# Patient Record
Sex: FEMALE | Race: WHITE | NOT HISPANIC OR LATINO | Employment: OTHER | URBAN - NONMETROPOLITAN AREA
[De-identification: names, ages, dates, MRNs, and addresses within clinical notes are randomized per-mention and may not be internally consistent; named-entity substitution may affect disease eponyms.]

---

## 2017-11-11 ENCOUNTER — HOSPITAL ENCOUNTER (EMERGENCY)
Facility: HOSPITAL | Age: 62
Discharge: HOME | End: 2017-11-11
Attending: EMERGENCY MEDICINE | Admitting: EMERGENCY MEDICINE

## 2017-11-11 ENCOUNTER — APPOINTMENT (OUTPATIENT)
Dept: GENERAL RADIOLOGY | Facility: HOSPITAL | Age: 62
End: 2017-11-11

## 2017-11-11 ENCOUNTER — APPOINTMENT (OUTPATIENT)
Dept: CT IMAGING | Facility: HOSPITAL | Age: 62
End: 2017-11-11

## 2017-11-11 VITALS
WEIGHT: 170 LBS | TEMPERATURE: 98.2 F | OXYGEN SATURATION: 98 % | BODY MASS INDEX: 27.32 KG/M2 | DIASTOLIC BLOOD PRESSURE: 101 MMHG | RESPIRATION RATE: 20 BRPM | SYSTOLIC BLOOD PRESSURE: 180 MMHG | HEIGHT: 66 IN | HEART RATE: 64 BPM

## 2017-11-11 DIAGNOSIS — I10 HYPERTENSION, ESSENTIAL: ICD-10-CM

## 2017-11-11 DIAGNOSIS — R55 SYNCOPE, UNSPECIFIED SYNCOPE TYPE: Primary | ICD-10-CM

## 2017-11-11 LAB
A-A DO2: 41.4 MMHG (ref 0–300)
ALBUMIN SERPL-MCNC: 4.5 G/DL (ref 3.4–4.8)
ALBUMIN/GLOB SERPL: 1.4 G/DL (ref 1.5–2.5)
ALP SERPL-CCNC: 93 U/L (ref 35–104)
ALT SERPL W P-5'-P-CCNC: 22 U/L (ref 10–36)
ANION GAP SERPL CALCULATED.3IONS-SCNC: 2.6 MMOL/L (ref 3.6–11.2)
ARTERIAL PATENCY WRIST A: ABNORMAL
AST SERPL-CCNC: 21 U/L (ref 10–30)
ATMOSPHERIC PRESS: 735 MMHG
BACTERIA UR QL AUTO: ABNORMAL /HPF
BASE EXCESS BLDA CALC-SCNC: -0.9 MMOL/L
BASOPHILS # BLD AUTO: 0.02 10*3/MM3 (ref 0–0.3)
BASOPHILS NFR BLD AUTO: 0.3 % (ref 0–2)
BDY SITE: ABNORMAL
BILIRUB SERPL-MCNC: 0.3 MG/DL (ref 0.2–1.8)
BILIRUB UR QL STRIP: NEGATIVE
BODY TEMPERATURE: 98.6 C
BUN BLD-MCNC: 7 MG/DL (ref 7–21)
BUN/CREAT SERPL: 10.1 (ref 7–25)
CALCIUM SPEC-SCNC: 9.7 MG/DL (ref 7.7–10)
CHLORIDE SERPL-SCNC: 111 MMOL/L (ref 99–112)
CLARITY UR: CLEAR
CO2 SERPL-SCNC: 28.4 MMOL/L (ref 24.3–31.9)
COHGB MFR BLD: 5.3 % (ref 0–5)
COLOR UR: YELLOW
CREAT BLD-MCNC: 0.69 MG/DL (ref 0.43–1.29)
DEPRECATED RDW RBC AUTO: 44.4 FL (ref 37–54)
EOSINOPHIL # BLD AUTO: 0.05 10*3/MM3 (ref 0–0.7)
EOSINOPHIL NFR BLD AUTO: 0.8 % (ref 0–5)
ERYTHROCYTE [DISTWIDTH] IN BLOOD BY AUTOMATED COUNT: 13.6 % (ref 11.5–14.5)
GFR SERPL CREATININE-BSD FRML MDRD: 86 ML/MIN/1.73
GLOBULIN UR ELPH-MCNC: 3.2 GM/DL
GLUCOSE BLD-MCNC: 103 MG/DL (ref 70–110)
GLUCOSE UR STRIP-MCNC: NEGATIVE MG/DL
HCO3 BLDA-SCNC: 22.5 MMOL/L (ref 22–26)
HCT VFR BLD AUTO: 46.4 % (ref 37–47)
HCT VFR BLD CALC: 50 % (ref 37–47)
HGB BLD-MCNC: 15.7 G/DL (ref 12–16)
HGB BLDA-MCNC: 16.9 G/DL (ref 12–16)
HGB UR QL STRIP.AUTO: ABNORMAL
HOROWITZ INDEX BLD+IHG-RTO: 21 %
HYALINE CASTS UR QL AUTO: ABNORMAL /LPF
IMM GRANULOCYTES # BLD: 0.01 10*3/MM3 (ref 0–0.03)
IMM GRANULOCYTES NFR BLD: 0.2 % (ref 0–0.5)
KETONES UR QL STRIP: NEGATIVE
LEUKOCYTE ESTERASE UR QL STRIP.AUTO: NEGATIVE
LYMPHOCYTES # BLD AUTO: 2.35 10*3/MM3 (ref 1–3)
LYMPHOCYTES NFR BLD AUTO: 35.3 % (ref 21–51)
MCH RBC QN AUTO: 30.8 PG (ref 27–33)
MCHC RBC AUTO-ENTMCNC: 33.8 G/DL (ref 33–37)
MCV RBC AUTO: 91.2 FL (ref 80–94)
METHGB BLD QL: 0.2 % (ref 0–3)
MODALITY: ABNORMAL
MONOCYTES # BLD AUTO: 0.69 10*3/MM3 (ref 0.1–0.9)
MONOCYTES NFR BLD AUTO: 10.4 % (ref 0–10)
NEUTROPHILS # BLD AUTO: 3.54 10*3/MM3 (ref 1.4–6.5)
NEUTROPHILS NFR BLD AUTO: 53 % (ref 30–70)
NITRITE UR QL STRIP: NEGATIVE
OSMOLALITY SERPL CALC.SUM OF ELEC: 281.3 MOSM/KG (ref 273–305)
OXYHGB MFR BLDV: 87.7 % (ref 85–100)
PCO2 BLDA: 34.3 MM HG (ref 35–45)
PH BLDA: 7.43 PH UNITS (ref 7.35–7.45)
PH UR STRIP.AUTO: 7 [PH] (ref 5–8)
PLATELET # BLD AUTO: 245 10*3/MM3 (ref 130–400)
PMV BLD AUTO: 9.9 FL (ref 6–10)
PO2 BLDA: 62 MM HG (ref 80–100)
POTASSIUM BLD-SCNC: 3.6 MMOL/L (ref 3.5–5.3)
PROT SERPL-MCNC: 7.7 G/DL (ref 6–8)
PROT UR QL STRIP: NEGATIVE
RBC # BLD AUTO: 5.09 10*6/MM3 (ref 4.2–5.4)
RBC # UR: ABNORMAL /HPF
REF LAB TEST METHOD: ABNORMAL
SAO2 % BLDCOA: 92.8 % (ref 90–100)
SODIUM BLD-SCNC: 142 MMOL/L (ref 135–153)
SP GR UR STRIP: 1.01 (ref 1–1.03)
SQUAMOUS #/AREA URNS HPF: ABNORMAL /HPF
TROPONIN I SERPL-MCNC: <0.006 NG/ML
UROBILINOGEN UR QL STRIP: ABNORMAL
WBC NRBC COR # BLD: 6.66 10*3/MM3 (ref 4.5–12.5)
WBC UR QL AUTO: ABNORMAL /HPF

## 2017-11-11 PROCEDURE — 93010 ELECTROCARDIOGRAM REPORT: CPT | Performed by: INTERNAL MEDICINE

## 2017-11-11 PROCEDURE — 83050 HGB METHEMOGLOBIN QUAN: CPT | Performed by: EMERGENCY MEDICINE

## 2017-11-11 PROCEDURE — 84484 ASSAY OF TROPONIN QUANT: CPT | Performed by: EMERGENCY MEDICINE

## 2017-11-11 PROCEDURE — 36600 WITHDRAWAL OF ARTERIAL BLOOD: CPT | Performed by: EMERGENCY MEDICINE

## 2017-11-11 PROCEDURE — 70450 CT HEAD/BRAIN W/O DYE: CPT

## 2017-11-11 PROCEDURE — 85025 COMPLETE CBC W/AUTO DIFF WBC: CPT | Performed by: EMERGENCY MEDICINE

## 2017-11-11 PROCEDURE — 99284 EMERGENCY DEPT VISIT MOD MDM: CPT

## 2017-11-11 PROCEDURE — 70450 CT HEAD/BRAIN W/O DYE: CPT | Performed by: RADIOLOGY

## 2017-11-11 PROCEDURE — 81001 URINALYSIS AUTO W/SCOPE: CPT | Performed by: EMERGENCY MEDICINE

## 2017-11-11 PROCEDURE — 82805 BLOOD GASES W/O2 SATURATION: CPT | Performed by: EMERGENCY MEDICINE

## 2017-11-11 PROCEDURE — 93005 ELECTROCARDIOGRAM TRACING: CPT | Performed by: EMERGENCY MEDICINE

## 2017-11-11 PROCEDURE — 82375 ASSAY CARBOXYHB QUANT: CPT | Performed by: EMERGENCY MEDICINE

## 2017-11-11 PROCEDURE — 71010 HC CHEST PA OR AP: CPT

## 2017-11-11 PROCEDURE — 80053 COMPREHEN METABOLIC PANEL: CPT | Performed by: EMERGENCY MEDICINE

## 2017-11-11 PROCEDURE — 71010 XR CHEST 1 VW: CPT | Performed by: RADIOLOGY

## 2017-11-11 RX ORDER — LISINOPRIL 10 MG/1
10 TABLET ORAL DAILY
Qty: 30 TABLET | Refills: 0 | Status: SHIPPED | OUTPATIENT
Start: 2017-11-11 | End: 2017-11-28 | Stop reason: SDUPTHER

## 2017-11-11 RX ORDER — SODIUM CHLORIDE 0.9 % (FLUSH) 0.9 %
10 SYRINGE (ML) INJECTION AS NEEDED
Status: DISCONTINUED | OUTPATIENT
Start: 2017-11-11 | End: 2017-11-11 | Stop reason: HOSPADM
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
## 2017-11-28 ENCOUNTER — OFFICE VISIT (OUTPATIENT)
Dept: FAMILY MEDICINE CLINIC | Facility: CLINIC | Age: 62
End: 2017-11-28

## 2017-11-28 VITALS
SYSTOLIC BLOOD PRESSURE: 148 MMHG | TEMPERATURE: 97.3 F | BODY MASS INDEX: 29.47 KG/M2 | WEIGHT: 183.4 LBS | HEART RATE: 80 BPM | HEIGHT: 66 IN | DIASTOLIC BLOOD PRESSURE: 97 MMHG

## 2017-11-28 DIAGNOSIS — I34.1 MVP (MITRAL VALVE PROLAPSE): ICD-10-CM

## 2017-11-28 DIAGNOSIS — I10 ESSENTIAL HYPERTENSION: Primary | ICD-10-CM

## 2017-11-28 DIAGNOSIS — R55 SYNCOPE, UNSPECIFIED SYNCOPE TYPE: ICD-10-CM

## 2017-11-28 DIAGNOSIS — Z23 NEED FOR 23-POLYVALENT PNEUMOCOCCAL POLYSACCHARIDE VACCINE: ICD-10-CM

## 2017-11-28 DIAGNOSIS — Z23 NEED FOR INFLUENZA VACCINATION: ICD-10-CM

## 2017-11-28 PROBLEM — K82.9 DISEASE OF GALLBLADDER: Status: ACTIVE | Noted: 2017-11-28

## 2017-11-28 PROBLEM — K59.00 CN (CONSTIPATION): Status: ACTIVE | Noted: 2017-11-28

## 2017-11-28 PROBLEM — R10.9 ABDOMINAL PAIN: Status: ACTIVE | Noted: 2017-11-28

## 2017-11-28 LAB
CHOLEST SERPL-MCNC: 202 MG/DL (ref 0–200)
HDLC SERPL-MCNC: 48 MG/DL (ref 60–100)
LDLC SERPL CALC-MCNC: 123 MG/DL (ref 0–100)
LDLC/HDLC SERPL: 2.57 {RATIO}
TRIGL SERPL-MCNC: 153 MG/DL (ref 0–150)
TSH SERPL DL<=0.05 MIU/L-ACNC: 7.9 MIU/ML (ref 0.55–4.78)
VLDLC SERPL-MCNC: 30.6 MG/DL

## 2017-11-28 PROCEDURE — 90686 IIV4 VACC NO PRSV 0.5 ML IM: CPT | Performed by: NURSE PRACTITIONER

## 2017-11-28 PROCEDURE — 90732 PPSV23 VACC 2 YRS+ SUBQ/IM: CPT | Performed by: NURSE PRACTITIONER

## 2017-11-28 PROCEDURE — 80061 LIPID PANEL: CPT | Performed by: NURSE PRACTITIONER

## 2017-11-28 PROCEDURE — 99214 OFFICE O/P EST MOD 30 MIN: CPT | Performed by: NURSE PRACTITIONER

## 2017-11-28 PROCEDURE — 90471 IMMUNIZATION ADMIN: CPT | Performed by: NURSE PRACTITIONER

## 2017-11-28 PROCEDURE — 90472 IMMUNIZATION ADMIN EACH ADD: CPT | Performed by: NURSE PRACTITIONER

## 2017-11-28 PROCEDURE — 84443 ASSAY THYROID STIM HORMONE: CPT | Performed by: NURSE PRACTITIONER

## 2017-11-28 RX ORDER — LISINOPRIL 10 MG/1
10 TABLET ORAL DAILY
Qty: 30 TABLET | Refills: 3 | Status: SHIPPED | OUTPATIENT
Start: 2017-11-28 | End: 2018-03-08 | Stop reason: SDUPTHER
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
## 2017-11-29 DIAGNOSIS — R79.89 ELEVATED TSH: Primary | ICD-10-CM

## 2017-12-04 ENCOUNTER — LAB (OUTPATIENT)
Dept: FAMILY MEDICINE CLINIC | Facility: CLINIC | Age: 62
End: 2017-12-04

## 2017-12-04 DIAGNOSIS — R79.89 ELEVATED TSH: ICD-10-CM

## 2017-12-04 PROCEDURE — 86376 MICROSOMAL ANTIBODY EACH: CPT | Performed by: NURSE PRACTITIONER

## 2017-12-04 PROCEDURE — 36415 COLL VENOUS BLD VENIPUNCTURE: CPT | Performed by: NURSE PRACTITIONER

## 2017-12-05 PROBLEM — E03.8 OTHER SPECIFIED HYPOTHYROIDISM: Status: ACTIVE | Noted: 2017-12-05

## 2017-12-05 LAB — THYROPEROXIDASE AB SERPL-ACNC: 99 IU/ML (ref 0–34)

## 2017-12-05 RX ORDER — LEVOTHYROXINE SODIUM 0.03 MG/1
25 TABLET ORAL DAILY
Qty: 30 TABLET | Refills: 2 | Status: SHIPPED | OUTPATIENT
Start: 2017-12-05 | End: 2018-01-29 | Stop reason: SDUPTHER

## 2018-01-29 ENCOUNTER — OFFICE VISIT (OUTPATIENT)
Dept: FAMILY MEDICINE CLINIC | Facility: CLINIC | Age: 63
End: 2018-01-29

## 2018-01-29 VITALS
HEART RATE: 82 BPM | DIASTOLIC BLOOD PRESSURE: 86 MMHG | TEMPERATURE: 97.2 F | SYSTOLIC BLOOD PRESSURE: 158 MMHG | BODY MASS INDEX: 29.86 KG/M2 | HEIGHT: 66 IN | OXYGEN SATURATION: 98 % | WEIGHT: 185.8 LBS

## 2018-01-29 DIAGNOSIS — E78.49 OTHER HYPERLIPIDEMIA: ICD-10-CM

## 2018-01-29 DIAGNOSIS — E03.8 OTHER SPECIFIED HYPOTHYROIDISM: Primary | ICD-10-CM

## 2018-01-29 DIAGNOSIS — I10 ESSENTIAL HYPERTENSION: ICD-10-CM

## 2018-01-29 LAB
T4 FREE SERPL-MCNC: 1.1 NG/DL (ref 0.89–1.76)
TSH SERPL DL<=0.05 MIU/L-ACNC: 5.31 MIU/ML (ref 0.55–4.78)

## 2018-01-29 PROCEDURE — 36415 COLL VENOUS BLD VENIPUNCTURE: CPT | Performed by: NURSE PRACTITIONER

## 2018-01-29 PROCEDURE — 99214 OFFICE O/P EST MOD 30 MIN: CPT | Performed by: NURSE PRACTITIONER

## 2018-01-29 PROCEDURE — 84443 ASSAY THYROID STIM HORMONE: CPT | Performed by: NURSE PRACTITIONER

## 2018-01-29 PROCEDURE — 84439 ASSAY OF FREE THYROXINE: CPT | Performed by: NURSE PRACTITIONER

## 2018-01-29 RX ORDER — SIMVASTATIN 20 MG
20 TABLET ORAL NIGHTLY
Qty: 30 TABLET | Refills: 5 | Status: SHIPPED | OUTPATIENT
Start: 2018-01-29 | End: 2018-08-16 | Stop reason: SDUPTHER

## 2018-01-29 RX ORDER — LEVOTHYROXINE SODIUM 0.05 MG/1
50 TABLET ORAL DAILY
Qty: 30 TABLET | Refills: 2 | Status: SHIPPED | OUTPATIENT
Start: 2018-01-29 | End: 2018-04-30 | Stop reason: SDUPTHER

## 2018-01-29 NOTE — PROGRESS NOTES
"Subjective   Jocelyn Gee is a 62 y.o. female.   Chief Complaint   Patient presents with   • Essential hypertension     History of Present Illness     Patient presents today for routine follow-up regarding hypertension.  Her blood pressure is elevated in the office.  However, she does report extreme anxiety when she comes into the office.  She has a blood pressure log with her today.  She has been monitoring daily at home.  Most of her blood pressure readings have been 120 systolic and 80 diastolic.  She is tolerating medication well.  She also has history of hypothyroidism.  She has been tolerating the Synthroid well.  She will need repeat TSH today.  Her lipids were also elevated.  She is a current smoker.  Her ASCVD 10 year risk estimate is 11%.  She was referred to cardiology for further evaluation of the syncopal episode a few months ago.  However, reports at this time it is cost prohibitive and she does not wish to seek further evaluation.  She has had no more presyncopal or syncopal episodes.    The following portions of the patient's history were reviewed and updated as appropriate: allergies, current medications, past family history, past medical history, past social history, past surgical history and problem list.  /86 (BP Location: Left arm, Patient Position: Sitting, Cuff Size: Adult)  Pulse 82  Temp 97.2 °F (36.2 °C) (Oral)   Ht 167.6 cm (65.98\")  Wt 84.3 kg (185 lb 12.8 oz)  SpO2 98%  BMI 30 kg/m2  Review of Systems   Constitutional: Positive for fatigue. Negative for chills and fever.   HENT: Negative for congestion, ear pain, rhinorrhea and sore throat.    Respiratory: Negative for cough, shortness of breath and wheezing.    Cardiovascular: Negative for chest pain, palpitations and leg swelling.   Gastrointestinal: Positive for constipation. Negative for abdominal pain, diarrhea, nausea and vomiting.   Genitourinary: Negative for dysuria.   Musculoskeletal: Negative for back pain and " myalgias.   Skin: Negative for rash and wound.   Neurological: Negative for dizziness, light-headedness and headaches.   Psychiatric/Behavioral: Negative for sleep disturbance. The patient is not nervous/anxious.        Objective   Physical Exam   Constitutional: She is oriented to person, place, and time. She appears well-developed and well-nourished.   HENT:   Head: Normocephalic and atraumatic.   Cardiovascular: Normal rate, regular rhythm and normal heart sounds.    Pulmonary/Chest: Effort normal and breath sounds normal.   Abdominal: Soft. Bowel sounds are normal.   Musculoskeletal:   Gait and station normal   Neurological: She is alert and oriented to person, place, and time.   Skin: Skin is warm and dry.   Psychiatric: She has a normal mood and affect. Her behavior is normal.       Assessment/Plan   Jocelyn was seen today for essential hypertension.    Diagnoses and all orders for this visit:    Other specified hypothyroidism  -     TSH  -     T4, Free    Other hyperlipidemia  -     simvastatin (ZOCOR) 20 MG tablet; Take 1 tablet by mouth Every Night.    Essential hypertension      Her home blood pressure readings are controlled.  No changes made to the medications today since she does become very anxious when in the office.  She will continue to monitor at home.  BP parameters discussed.  I have ordered labs to be done today to reevaluate thyroid function.  We have discussed the hyperlipidemia and cardiac risk factors.  Her ASCVD 10 year risk estimate is 11%.  She is simvastatin benefit great.  I have prescribed simvastatin as noted.  We have discussed dietary changes today.  She has declined cardiology referral.  I have also offered to order an echocardiogram to further evaluate the syncopal episode and history of mitral valve abnormality but she has declined at this time.  She will follow-up in 3 months and as needed.

## 2018-03-08 ENCOUNTER — TELEPHONE (OUTPATIENT)
Dept: FAMILY MEDICINE CLINIC | Facility: CLINIC | Age: 63
End: 2018-03-08

## 2018-03-08 ENCOUNTER — OFFICE VISIT (OUTPATIENT)
Dept: FAMILY MEDICINE CLINIC | Facility: CLINIC | Age: 63
End: 2018-03-08

## 2018-03-08 VITALS
BODY MASS INDEX: 28.98 KG/M2 | OXYGEN SATURATION: 97 % | DIASTOLIC BLOOD PRESSURE: 85 MMHG | HEART RATE: 84 BPM | SYSTOLIC BLOOD PRESSURE: 149 MMHG | HEIGHT: 66 IN | WEIGHT: 180.3 LBS | TEMPERATURE: 97.8 F

## 2018-03-08 DIAGNOSIS — R55 SYNCOPE, UNSPECIFIED SYNCOPE TYPE: ICD-10-CM

## 2018-03-08 DIAGNOSIS — I34.1 MITRAL VALVE PROLAPSE: Primary | ICD-10-CM

## 2018-03-08 DIAGNOSIS — R00.2 PALPITATIONS: ICD-10-CM

## 2018-03-08 DIAGNOSIS — I10 ESSENTIAL HYPERTENSION: ICD-10-CM

## 2018-03-08 PROCEDURE — 93005 ELECTROCARDIOGRAM TRACING: CPT | Performed by: NURSE PRACTITIONER

## 2018-03-08 PROCEDURE — 99214 OFFICE O/P EST MOD 30 MIN: CPT | Performed by: NURSE PRACTITIONER

## 2018-03-08 RX ORDER — LISINOPRIL 20 MG/1
20 TABLET ORAL DAILY
Qty: 30 TABLET | Refills: 5 | Status: SHIPPED | OUTPATIENT
Start: 2018-03-08 | End: 2018-08-01

## 2018-03-08 NOTE — TELEPHONE ENCOUNTER
PATIENT CALLED AND STATES SHE IS HAVING TROUBLE WITH HER BP. SHE SAID IT WOKE HER UP AND IT FELT LIKE PALPITATIONS, FELT LIKE HER HEART STOPPED THEN STARTED BACK. HER READINGS THIS MORNING /90, 8:00AM 131/80, 8:30AM 134/84. 10:30 141/90. SHE IS WANTING TO KNOW WHAT TO DO. SHE IS SCHEDULED TODAY AT 4:00, UNLESS NOT NEEDED

## 2018-03-09 NOTE — PROGRESS NOTES
"Subjective   Jocelyn Gee is a 62 y.o. female.   Chief Complaint   Patient presents with   • Hypertension     History of Present Illness     The patient presents today with c/o elevated BP. She has been monitoring her BP for the past 24 hours and readings have been 130's-140's systolic and 80's-90's diastolic. She also was awakened in the middle of the night last night due to palpitations. States she felt her heart skipping beats. She did not feel lightheaded, dizzy, or short of breath. She did not check her BP at that time. She has felt fine today. She does report she ate cake and icing prior to bed and thinks that may have contributed. She does have a history of MVP and syncope. She has declined evaluation in the past.    The following portions of the patient's history were reviewed and updated as appropriate: allergies, current medications, past family history, past medical history, past social history, past surgical history and problem list.  /85 (BP Location: Left arm, Patient Position: Sitting, Cuff Size: Adult)  Pulse 84  Temp 97.8 °F (36.6 °C) (Oral)   Ht 167.6 cm (65.98\")  Wt 81.8 kg (180 lb 4.8 oz)  SpO2 97%  BMI 29.12 kg/m2  Review of Systems   Constitutional: Negative for chills, fatigue and fever.   HENT: Negative for congestion, ear pain, rhinorrhea and sore throat.    Respiratory: Negative for cough, shortness of breath and wheezing.    Cardiovascular: Positive for palpitations. Negative for chest pain and leg swelling.   Gastrointestinal: Negative for abdominal pain, diarrhea, nausea and vomiting.   Genitourinary: Negative for dysuria and urgency.   Musculoskeletal: Negative for back pain and myalgias.   Skin: Negative for rash and wound.   Neurological: Negative for dizziness, light-headedness and headaches.   Psychiatric/Behavioral: Negative for sleep disturbance. The patient is not nervous/anxious.        Objective   Physical Exam   Constitutional: She is oriented to person, place, and " time. She appears well-developed and well-nourished.   HENT:   Head: Normocephalic and atraumatic.   Cardiovascular: Normal rate, regular rhythm and normal heart sounds.    Pulmonary/Chest: Effort normal and breath sounds normal.   Abdominal: Soft. Bowel sounds are normal.   Musculoskeletal:   Gait and station normal   Neurological: She is alert and oriented to person, place, and time.   Skin: Skin is warm and dry.   Psychiatric: She has a normal mood and affect. Her behavior is normal.       Assessment/Plan   Jocelyn was seen today for hypertension.    Diagnoses and all orders for this visit:    Mitral valve prolapse  -     Adult Transthoracic Echo Limited W/ Cont if Necessary Per Protocol; Future  -     Holter monitor - 24 hour; Future    Essential hypertension  -     Adult Transthoracic Echo Limited W/ Cont if Necessary Per Protocol; Future  -     Holter monitor - 24 hour; Future  -     lisinopril (PRINIVIL,ZESTRIL) 20 MG tablet; Take 1 tablet by mouth Daily.    Syncope, unspecified syncope type  -     Adult Transthoracic Echo Limited W/ Cont if Necessary Per Protocol; Future  -     Holter monitor - 24 hour; Future    Palpitations  -     Adult Transthoracic Echo Limited W/ Cont if Necessary Per Protocol; Future  -     Holter monitor - 24 hour; Future          ECG today is normal sinus rhythm. However, I have recommended echocardiogram and holter monitor due to the history of MVP and recent syncopal episode. She is agreeable. I have also increased the lisinopril to 20 mg daily. She will follow up as scheduled and needed.

## 2018-03-22 ENCOUNTER — APPOINTMENT (OUTPATIENT)
Dept: CARDIOLOGY | Facility: HOSPITAL | Age: 63
End: 2018-03-22

## 2018-03-30 ENCOUNTER — HOSPITAL ENCOUNTER (OUTPATIENT)
Dept: CARDIOLOGY | Facility: HOSPITAL | Age: 63
Discharge: HOME | End: 2018-03-30
Admitting: NURSE PRACTITIONER

## 2018-03-30 DIAGNOSIS — R55 SYNCOPE, UNSPECIFIED SYNCOPE TYPE: ICD-10-CM

## 2018-03-30 DIAGNOSIS — I10 ESSENTIAL HYPERTENSION: ICD-10-CM

## 2018-03-30 DIAGNOSIS — I34.1 MITRAL VALVE PROLAPSE: ICD-10-CM

## 2018-03-30 DIAGNOSIS — R00.2 PALPITATIONS: ICD-10-CM

## 2018-03-30 PROCEDURE — 93306 TTE W/DOPPLER COMPLETE: CPT | Performed by: INTERNAL MEDICINE

## 2018-03-30 PROCEDURE — 93306 TTE W/DOPPLER COMPLETE: CPT

## 2018-04-02 LAB
BH CV ECHO MEAS - % IVS THICK: 20.8 %
BH CV ECHO MEAS - % LVPW THICK: 54.9 %
BH CV ECHO MEAS - ACS: 2.1 CM
BH CV ECHO MEAS - AO MAX PG: 9.4 MMHG
BH CV ECHO MEAS - AO MEAN PG: 4.2 MMHG
BH CV ECHO MEAS - AO ROOT AREA (BSA CORRECTED): 1.6
BH CV ECHO MEAS - AO ROOT AREA: 6.8 CM^2
BH CV ECHO MEAS - AO ROOT DIAM: 2.9 CM
BH CV ECHO MEAS - AO V2 MAX: 153.4 CM/SEC
BH CV ECHO MEAS - AO V2 MEAN: 93.4 CM/SEC
BH CV ECHO MEAS - AO V2 VTI: 27.6 CM
BH CV ECHO MEAS - BSA(HAYCOCK): 2 M^2
BH CV ECHO MEAS - BSA: 1.9 M^2
BH CV ECHO MEAS - BZI_BMI: 30 KILOGRAMS/M^2
BH CV ECHO MEAS - BZI_METRIC_HEIGHT: 165.1 CM
BH CV ECHO MEAS - BZI_METRIC_WEIGHT: 81.6 KG
BH CV ECHO MEAS - CONTRAST EF 4CH: 67.6 ML/M^2
BH CV ECHO MEAS - EDV(CUBED): 117.8 ML
BH CV ECHO MEAS - EDV(MOD-SP4): 37 ML
BH CV ECHO MEAS - EDV(TEICH): 112.9 ML
BH CV ECHO MEAS - EF(CUBED): 60.6 %
BH CV ECHO MEAS - EF(MOD-SP4): 67.6 %
BH CV ECHO MEAS - EF(TEICH): 52 %
BH CV ECHO MEAS - ESV(CUBED): 46.5 ML
BH CV ECHO MEAS - ESV(MOD-SP4): 12 ML
BH CV ECHO MEAS - ESV(TEICH): 54.3 ML
BH CV ECHO MEAS - FS: 26.7 %
BH CV ECHO MEAS - IVS/LVPW: 1.2
BH CV ECHO MEAS - IVSD: 0.88 CM
BH CV ECHO MEAS - IVSS: 1.1 CM
BH CV ECHO MEAS - LA DIMENSION: 2.9 CM
BH CV ECHO MEAS - LA/AO: 1
BH CV ECHO MEAS - LV DIASTOLIC VOL/BSA (35-75): 19.6 ML/M^2
BH CV ECHO MEAS - LV MASS(C)D: 132.8 GRAMS
BH CV ECHO MEAS - LV MASS(C)DI: 70.2 GRAMS/M^2
BH CV ECHO MEAS - LV MASS(C)S: 123.9 GRAMS
BH CV ECHO MEAS - LV MASS(C)SI: 65.5 GRAMS/M^2
BH CV ECHO MEAS - LV SYSTOLIC VOL/BSA (12-30): 6.3 ML/M^2
BH CV ECHO MEAS - LVIDD: 4.9 CM
BH CV ECHO MEAS - LVIDS: 3.6 CM
BH CV ECHO MEAS - LVLD AP4: 6.5 CM
BH CV ECHO MEAS - LVLS AP4: 5.9 CM
BH CV ECHO MEAS - LVOT AREA (M): 3.1 CM^2
BH CV ECHO MEAS - LVOT AREA: 3.2 CM^2
BH CV ECHO MEAS - LVOT DIAM: 2 CM
BH CV ECHO MEAS - LVPWD: 0.73 CM
BH CV ECHO MEAS - LVPWS: 1.1 CM
BH CV ECHO MEAS - MV A MAX VEL: 103.5 CM/SEC
BH CV ECHO MEAS - MV E MAX VEL: 87.8 CM/SEC
BH CV ECHO MEAS - MV E/A: 0.85
BH CV ECHO MEAS - PA ACC SLOPE: 1577 CM/SEC^2
BH CV ECHO MEAS - PA ACC TIME: 0.08 SEC
BH CV ECHO MEAS - PA PR(ACCEL): 44.1 MMHG
BH CV ECHO MEAS - RAP SYSTOLE: 10 MMHG
BH CV ECHO MEAS - RVSP: 31.6 MMHG
BH CV ECHO MEAS - SI(AO): 98.7 ML/M^2
BH CV ECHO MEAS - SI(CUBED): 37.7 ML/M^2
BH CV ECHO MEAS - SI(MOD-SP4): 13.2 ML/M^2
BH CV ECHO MEAS - SI(TEICH): 31 ML/M^2
BH CV ECHO MEAS - SV(AO): 186.7 ML
BH CV ECHO MEAS - SV(CUBED): 71.3 ML
BH CV ECHO MEAS - SV(MOD-SP4): 25 ML
BH CV ECHO MEAS - SV(TEICH): 58.7 ML
BH CV ECHO MEAS - TR MAX VEL: 232.1 CM/SEC
MAXIMAL PREDICTED HEART RATE: 157 BPM
STRESS TARGET HR: 133 BPM

## 2018-04-30 ENCOUNTER — OFFICE VISIT (OUTPATIENT)
Dept: FAMILY MEDICINE CLINIC | Facility: CLINIC | Age: 63
End: 2018-04-30

## 2018-04-30 VITALS
BODY MASS INDEX: 27.83 KG/M2 | TEMPERATURE: 97.6 F | OXYGEN SATURATION: 97 % | SYSTOLIC BLOOD PRESSURE: 131 MMHG | HEART RATE: 74 BPM | WEIGHT: 173.2 LBS | DIASTOLIC BLOOD PRESSURE: 81 MMHG | HEIGHT: 66 IN | RESPIRATION RATE: 12 BRPM

## 2018-04-30 DIAGNOSIS — L20.82 FLEXURAL ECZEMA: ICD-10-CM

## 2018-04-30 DIAGNOSIS — I10 ESSENTIAL HYPERTENSION: ICD-10-CM

## 2018-04-30 DIAGNOSIS — E78.49 OTHER HYPERLIPIDEMIA: Primary | ICD-10-CM

## 2018-04-30 DIAGNOSIS — E03.8 OTHER SPECIFIED HYPOTHYROIDISM: ICD-10-CM

## 2018-04-30 DIAGNOSIS — Z11.59 NEED FOR HEPATITIS C SCREENING TEST: ICD-10-CM

## 2018-04-30 LAB
ALBUMIN SERPL-MCNC: 4.3 G/DL (ref 3.4–4.8)
ALBUMIN/GLOB SERPL: 1.4 G/DL (ref 1.5–2.5)
ALP SERPL-CCNC: 80 U/L (ref 35–104)
ALT SERPL W P-5'-P-CCNC: 19 U/L (ref 10–36)
ANION GAP SERPL CALCULATED.3IONS-SCNC: 4.2 MMOL/L (ref 3.6–11.2)
AST SERPL-CCNC: 22 U/L (ref 10–30)
BILIRUB SERPL-MCNC: 0.4 MG/DL (ref 0.2–1.8)
BUN BLD-MCNC: 10 MG/DL (ref 7–21)
BUN/CREAT SERPL: 15.4 (ref 7–25)
CALCIUM SPEC-SCNC: 9.6 MG/DL (ref 7.7–10)
CHLORIDE SERPL-SCNC: 109 MMOL/L (ref 99–112)
CHOLEST SERPL-MCNC: 141 MG/DL (ref 0–200)
CO2 SERPL-SCNC: 27.8 MMOL/L (ref 24.3–31.9)
CREAT BLD-MCNC: 0.65 MG/DL (ref 0.43–1.29)
GFR SERPL CREATININE-BSD FRML MDRD: 92 ML/MIN/1.73
GLOBULIN UR ELPH-MCNC: 3 GM/DL
GLUCOSE BLD-MCNC: 82 MG/DL (ref 70–110)
HCV AB SER DONR QL: NORMAL
HDLC SERPL-MCNC: 45 MG/DL (ref 60–100)
LDLC SERPL CALC-MCNC: 77 MG/DL (ref 0–100)
LDLC/HDLC SERPL: 1.7 {RATIO}
OSMOLALITY SERPL CALC.SUM OF ELEC: 279.4 MOSM/KG (ref 273–305)
POTASSIUM BLD-SCNC: 3.9 MMOL/L (ref 3.5–5.3)
PROT SERPL-MCNC: 7.3 G/DL (ref 6–8)
SODIUM BLD-SCNC: 141 MMOL/L (ref 135–153)
T4 FREE SERPL-MCNC: 1.31 NG/DL (ref 0.89–1.76)
TRIGL SERPL-MCNC: 97 MG/DL (ref 0–150)
TSH SERPL DL<=0.05 MIU/L-ACNC: 1.53 MIU/ML (ref 0.55–4.78)
VLDLC SERPL-MCNC: 19.4 MG/DL

## 2018-04-30 PROCEDURE — 86803 HEPATITIS C AB TEST: CPT | Performed by: NURSE PRACTITIONER

## 2018-04-30 PROCEDURE — 84443 ASSAY THYROID STIM HORMONE: CPT | Performed by: NURSE PRACTITIONER

## 2018-04-30 PROCEDURE — 80053 COMPREHEN METABOLIC PANEL: CPT | Performed by: NURSE PRACTITIONER

## 2018-04-30 PROCEDURE — 80061 LIPID PANEL: CPT | Performed by: NURSE PRACTITIONER

## 2018-04-30 PROCEDURE — 99213 OFFICE O/P EST LOW 20 MIN: CPT | Performed by: NURSE PRACTITIONER

## 2018-04-30 PROCEDURE — 84439 ASSAY OF FREE THYROXINE: CPT | Performed by: NURSE PRACTITIONER

## 2018-04-30 PROCEDURE — 36415 COLL VENOUS BLD VENIPUNCTURE: CPT | Performed by: NURSE PRACTITIONER

## 2018-04-30 RX ORDER — LEVOTHYROXINE SODIUM 0.05 MG/1
50 TABLET ORAL DAILY
Qty: 30 TABLET | Refills: 5 | Status: SHIPPED | OUTPATIENT
Start: 2018-04-30 | End: 2018-09-27 | Stop reason: SDUPTHER

## 2018-04-30 RX ORDER — CLOBETASOL PROPIONATE 0.5 MG/G
OINTMENT TOPICAL 2 TIMES DAILY PRN
Qty: 60 G | Refills: 0 | Status: SHIPPED | OUTPATIENT
Start: 2018-04-30 | End: 2019-01-16

## 2018-04-30 NOTE — PROGRESS NOTES
Please let her know her thyroid function is normal. I sent in refills on the synthroid. Also, her cholesterol is much better.

## 2018-04-30 NOTE — PROGRESS NOTES
"Win Gee is a 62 y.o. female.   Chief Complaint   Patient presents with   • Follow-up   • blood work     History of Present Illness     The patient presents today for routine follow-up regarding hypertension, hyperlipidemia.  Her blood pressure is controlled.  She has been compliant with medication.  Has also been working on diet and weight loss.  Has lost 10 pounds in the last 5 months intentionally.  She has a history of hypothyroidism on statin therapy. A recent echocardiogram showed left ventricular relaxation (Grade I). This was otherwise normal. She has not had the holter monitor that was ordered due to cost. She has had no syncopal episodes or palpitations. She continues to smoke and is not ready to quit.    The following portions of the patient's history were reviewed and updated as appropriate: allergies, current medications, past family history, past medical history, past social history, past surgical history and problem list.  /81 (BP Location: Right arm, Patient Position: Sitting, Cuff Size: Adult)   Pulse 74   Temp 97.6 °F (36.4 °C)   Resp 12   Ht 167.6 cm (66\")   Wt 78.6 kg (173 lb 3.2 oz)   SpO2 97%   BMI 27.96 kg/m²   Review of Systems   Constitutional: Negative for chills, fatigue and fever.   HENT: Negative for congestion, ear pain, rhinorrhea and sore throat.    Respiratory: Negative for cough, shortness of breath and wheezing.    Cardiovascular: Negative for chest pain, palpitations and leg swelling.   Gastrointestinal: Negative for abdominal pain, diarrhea, nausea and vomiting.   Genitourinary: Negative for dysuria.   Musculoskeletal: Negative for back pain and myalgias.   Skin: Negative for rash and wound.   Neurological: Negative for dizziness, light-headedness and headaches.   Psychiatric/Behavioral: Negative for sleep disturbance. The patient is not nervous/anxious.        Objective   Physical Exam   Constitutional: She is oriented to person, place, and time. She " appears well-developed and well-nourished.   HENT:   Head: Normocephalic and atraumatic.   Cardiovascular: Normal rate, regular rhythm and normal heart sounds.    Pulmonary/Chest: Effort normal and breath sounds normal.   Abdominal: Soft. Bowel sounds are normal.   Musculoskeletal:   Gait and station normal   Neurological: She is alert and oriented to person, place, and time.   Skin: Skin is warm and dry.   Psychiatric: She has a normal mood and affect. Her behavior is normal.       Assessment/Plan   Jocelyn was seen today for follow-up and blood work.    Diagnoses and all orders for this visit:    Other hyperlipidemia  -     TSH  -     T4, Free  -     Lipid Panel  -     Comprehensive Metabolic Panel    Essential hypertension  -     TSH  -     T4, Free  -     Lipid Panel  -     Comprehensive Metabolic Panel    Other specified hypothyroidism  -     TSH  -     T4, Free  -     Lipid Panel  -     Comprehensive Metabolic Panel    Flexural eczema  -     clobetasol (TEMOVATE) 0.05 % ointment; Apply  topically 2 (Two) Times a Day As Needed (rash).  -     TSH  -     T4, Free  -     Lipid Panel  -     Comprehensive Metabolic Panel    Need for hepatitis C screening test  -     Hepatitis C Antibody      We have discussed echocardiogram results at length.  Again, I have encouraged her to proceed with Holter monitor as ordered.  I have ordered fasting labs to be done today.  She will continue her current medications.  Continue weight loss program.  Continue to monitor BP at home.  I have also ordered hepatitis C screening.  She does report a history of eczema and has used clobetasol cream for years.  She asked for a prescription today.  I will renew this for her.  PME discussed.  She does not wish to pursue at this time.  Follow-up in 6 months and as needed.

## 2018-05-10 ENCOUNTER — HOSPITAL ENCOUNTER (OUTPATIENT)
Dept: RESPIRATORY THERAPY | Facility: HOSPITAL | Age: 63
Discharge: HOME | End: 2018-05-10
Admitting: NURSE PRACTITIONER

## 2018-05-10 DIAGNOSIS — I10 ESSENTIAL HYPERTENSION: ICD-10-CM

## 2018-05-10 DIAGNOSIS — R55 SYNCOPE, UNSPECIFIED SYNCOPE TYPE: ICD-10-CM

## 2018-05-10 DIAGNOSIS — I34.1 MITRAL VALVE PROLAPSE: ICD-10-CM

## 2018-05-10 DIAGNOSIS — R00.2 PALPITATIONS: ICD-10-CM

## 2018-05-10 PROCEDURE — 93225 XTRNL ECG REC<48 HRS REC: CPT

## 2018-05-10 PROCEDURE — 93226 XTRNL ECG REC<48 HR SCAN A/R: CPT

## 2018-05-15 PROCEDURE — 93227 XTRNL ECG REC<48 HR R&I: CPT | Performed by: INTERNAL MEDICINE

## 2018-08-01 ENCOUNTER — OFFICE VISIT (OUTPATIENT)
Dept: FAMILY MEDICINE CLINIC | Facility: CLINIC | Age: 63
End: 2018-08-01

## 2018-08-01 VITALS
TEMPERATURE: 97.6 F | HEART RATE: 72 BPM | SYSTOLIC BLOOD PRESSURE: 180 MMHG | DIASTOLIC BLOOD PRESSURE: 94 MMHG | HEIGHT: 66 IN | BODY MASS INDEX: 27.51 KG/M2 | WEIGHT: 171.2 LBS

## 2018-08-01 DIAGNOSIS — I10 ESSENTIAL HYPERTENSION: Primary | ICD-10-CM

## 2018-08-01 PROCEDURE — 99213 OFFICE O/P EST LOW 20 MIN: CPT | Performed by: FAMILY MEDICINE

## 2018-08-01 RX ORDER — LOSARTAN POTASSIUM 50 MG/1
50 TABLET ORAL DAILY
Qty: 30 TABLET | Refills: 6 | Status: SHIPPED | OUTPATIENT
Start: 2018-08-01 | End: 2019-02-14 | Stop reason: SDUPTHER

## 2018-08-05 NOTE — PROGRESS NOTES
"Subjective     Chief Complaint   Patient presents with   • Hypertension       Jocelyn Gee is a 62 y.o. female.     History of Present Illness concerned about fluctuating blood pressure.  No side effects perceived.  Denies headaches visual changes CP SOB palpitations GI  changes.  Compliant with medications.    The following portions of the patient's history were reviewed and updated as appropriate: allergies, current medications, past family history, past social history, past surgical history and problem list.    Review of Systems see the history of present illness    Objective   Physical Exam   Constitutional: She is oriented to person, place, and time. She appears well-developed and well-nourished.   HENT:   Head: Normocephalic.   Mouth/Throat: Oropharynx is clear and moist.   Eyes: Pupils are equal, round, and reactive to light. Conjunctivae and EOM are normal.   Neck: Normal range of motion. Neck supple.   Cardiovascular: Normal rate, regular rhythm and normal heart sounds.    No murmur heard.  Pulmonary/Chest: Effort normal and breath sounds normal.   Musculoskeletal: She exhibits no edema.   Neurological: She is alert and oriented to person, place, and time.   Psychiatric: She has a normal mood and affect.   Vitals reviewed.    /94 (BP Location: Left arm, Patient Position: Sitting, Cuff Size: Adult)   Pulse 72   Temp 97.6 °F (36.4 °C) (Oral)   Ht 167.6 cm (65.98\")   Wt 77.7 kg (171 lb 3.2 oz)   BMI 27.65 kg/m²   Assessment/Plan   Jocelyn was seen today for hypertension.    Diagnoses and all orders for this visit:    Essential hypertension  -     losartan (COZAAR) 50 MG tablet; Take 1 tablet by mouth Daily.      Home blood pressure readings noted.  Blood pressure noted here.  Will DC lisinopril and start losartan.  As we discussed may need additional medication.  Maintain heart healthy diet.  Stay safely active.  Recheck in about 2 weeks or as needed.         "

## 2018-08-16 ENCOUNTER — OFFICE VISIT (OUTPATIENT)
Dept: FAMILY MEDICINE CLINIC | Facility: CLINIC | Age: 63
End: 2018-08-16

## 2018-08-16 VITALS
WEIGHT: 170.6 LBS | DIASTOLIC BLOOD PRESSURE: 92 MMHG | BODY MASS INDEX: 27.42 KG/M2 | SYSTOLIC BLOOD PRESSURE: 148 MMHG | HEIGHT: 66 IN | HEART RATE: 76 BPM | TEMPERATURE: 97.9 F

## 2018-08-16 DIAGNOSIS — E78.49 OTHER HYPERLIPIDEMIA: ICD-10-CM

## 2018-08-16 DIAGNOSIS — I10 ESSENTIAL HYPERTENSION: Primary | ICD-10-CM

## 2018-08-16 PROCEDURE — 99213 OFFICE O/P EST LOW 20 MIN: CPT | Performed by: FAMILY MEDICINE

## 2018-08-16 RX ORDER — SIMVASTATIN 20 MG
20 TABLET ORAL NIGHTLY
Qty: 90 TABLET | Refills: 3 | Status: SHIPPED | OUTPATIENT
Start: 2018-08-16 | End: 2019-02-14 | Stop reason: SDUPTHER

## 2018-09-27 ENCOUNTER — TELEPHONE (OUTPATIENT)
Dept: FAMILY MEDICINE CLINIC | Facility: CLINIC | Age: 63
End: 2018-09-27

## 2018-09-27 RX ORDER — LEVOTHYROXINE SODIUM 0.05 MG/1
50 TABLET ORAL DAILY
Qty: 30 TABLET | Refills: 5 | Status: SHIPPED | OUTPATIENT
Start: 2018-09-27 | End: 2019-02-14 | Stop reason: SDUPTHER

## 2018-09-30 RX ORDER — HYDROCHLOROTHIAZIDE 25 MG/1
25 TABLET ORAL DAILY
Qty: 30 TABLET | Refills: 5 | Status: SHIPPED | OUTPATIENT
Start: 2018-09-30 | End: 2019-02-14 | Stop reason: SDUPTHER

## 2019-01-16 ENCOUNTER — OFFICE VISIT (OUTPATIENT)
Dept: RETAIL CLINIC | Facility: CLINIC | Age: 64
End: 2019-01-16

## 2019-01-16 VITALS
WEIGHT: 163.2 LBS | TEMPERATURE: 97.9 F | OXYGEN SATURATION: 98 % | SYSTOLIC BLOOD PRESSURE: 110 MMHG | HEART RATE: 80 BPM | DIASTOLIC BLOOD PRESSURE: 70 MMHG | HEIGHT: 66 IN | RESPIRATION RATE: 18 BRPM | BODY MASS INDEX: 26.23 KG/M2

## 2019-01-16 DIAGNOSIS — J06.9 ACUTE URI: Primary | ICD-10-CM

## 2019-01-16 LAB
EXPIRATION DATE: NORMAL
INTERNAL CONTROL: NORMAL
Lab: NORMAL
S PYO AG THROAT QL: NEGATIVE

## 2019-01-16 PROCEDURE — 99213 OFFICE O/P EST LOW 20 MIN: CPT | Performed by: NURSE PRACTITIONER

## 2019-01-16 PROCEDURE — 87880 STREP A ASSAY W/OPTIC: CPT | Performed by: NURSE PRACTITIONER

## 2019-01-16 RX ORDER — FLUTICASONE PROPIONATE 50 MCG
2 SPRAY, SUSPENSION (ML) NASAL DAILY
Qty: 1 BOTTLE | Refills: 0 | Status: SHIPPED | OUTPATIENT
Start: 2019-01-16 | End: 2019-02-14

## 2019-01-16 RX ORDER — GUAIFENESIN 600 MG/1
1200 TABLET, EXTENDED RELEASE ORAL 2 TIMES DAILY
Qty: 20 TABLET | Refills: 0 | Status: SHIPPED | OUTPATIENT
Start: 2019-01-16 | End: 2019-01-21

## 2019-01-16 RX ORDER — METHYLPREDNISOLONE 4 MG/1
TABLET ORAL
Qty: 1 EACH | Refills: 0 | Status: SHIPPED | OUTPATIENT
Start: 2019-01-16 | End: 2019-01-21

## 2019-01-16 NOTE — PATIENT INSTRUCTIONS
"Upper Respiratory Infection, Adult  Most upper respiratory infections (URIs) are a viral infection of the air passages leading to the lungs. A URI affects the nose, throat, and upper air passages. The most common type of URI is nasopharyngitis and is typically referred to as \"the common cold.\"  URIs run their course and usually go away on their own. Most of the time, a URI does not require medical attention, but sometimes a bacterial infection in the upper airways can follow a viral infection. This is called a secondary infection. Sinus and middle ear infections are common types of secondary upper respiratory infections.  Bacterial pneumonia can also complicate a URI. A URI can worsen asthma and chronic obstructive pulmonary disease (COPD). Sometimes, these complications can require emergency medical care and may be life threatening.  What are the causes?  Almost all URIs are caused by viruses. A virus is a type of germ and can spread from one person to another.  What increases the risk?  You may be at risk for a URI if:  · You smoke.  · You have chronic heart or lung disease.  · You have a weakened defense (immune) system.  · You are very young or very old.  · You have nasal allergies or asthma.  · You work in crowded or poorly ventilated areas.  · You work in health care facilities or schools.    What are the signs or symptoms?  Symptoms typically develop 2-3 days after you come in contact with a cold virus. Most viral URIs last 7-10 days. However, viral URIs from the influenza virus (flu virus) can last 14-18 days and are typically more severe. Symptoms may include:  · Runny or stuffy (congested) nose.  · Sneezing.  · Cough.  · Sore throat.  · Headache.  · Fatigue.  · Fever.  · Loss of appetite.  · Pain in your forehead, behind your eyes, and over your cheekbones (sinus pain).  · Muscle aches.    How is this diagnosed?  Your health care provider may diagnose a URI by:  · Physical exam.  · Tests to check that your " symptoms are not due to another condition such as:  ? Strep throat.  ? Sinusitis.  ? Pneumonia.  ? Asthma.    How is this treated?  A URI goes away on its own with time. It cannot be cured with medicines, but medicines may be prescribed or recommended to relieve symptoms. Medicines may help:  · Reduce your fever.  · Reduce your cough.  · Relieve nasal congestion.    Follow these instructions at home:  · Take medicines only as directed by your health care provider.  · Gargle warm saltwater or take cough drops to comfort your throat as directed by your health care provider.  · Use a warm mist humidifier or inhale steam from a shower to increase air moisture. This may make it easier to breathe.  · Drink enough fluid to keep your urine clear or pale yellow.  · Eat soups and other clear broths and maintain good nutrition.  · Rest as needed.  · Return to work when your temperature has returned to normal or as your health care provider advises. You may need to stay home longer to avoid infecting others. You can also use a face mask and careful hand washing to prevent spread of the virus.  · Increase the usage of your inhaler if you have asthma.  · Do not use any tobacco products, including cigarettes, chewing tobacco, or electronic cigarettes. If you need help quitting, ask your health care provider.  How is this prevented?  The best way to protect yourself from getting a cold is to practice good hygiene.  · Avoid oral or hand contact with people with cold symptoms.  · Wash your hands often if contact occurs.    There is no clear evidence that vitamin C, vitamin E, echinacea, or exercise reduces the chance of developing a cold. However, it is always recommended to get plenty of rest, exercise, and practice good nutrition.  Contact a health care provider if:  · You are getting worse rather than better.  · Your symptoms are not controlled by medicine.  · You have chills.  · You have worsening shortness of breath.  · You have  brown or red mucus.  · You have yellow or brown nasal discharge.  · You have pain in your face, especially when you bend forward.  · You have a fever.  · You have swollen neck glands.  · You have pain while swallowing.  · You have white areas in the back of your throat.  Get help right away if:  · You have severe or persistent:  ? Headache.  ? Ear pain.  ? Sinus pain.  ? Chest pain.  · You have chronic lung disease and any of the following:  ? Wheezing.  ? Prolonged cough.  ? Coughing up blood.  ? A change in your usual mucus.  · You have a stiff neck.  · You have changes in your:  ? Vision.  ? Hearing.  ? Thinking.  ? Mood.  This information is not intended to replace advice given to you by your health care provider. Make sure you discuss any questions you have with your health care provider.  Document Released: 06/13/2002 Document Revised: 08/20/2017 Document Reviewed: 03/25/2015  [employer] Patient Education © 2018 [employer]

## 2019-01-16 NOTE — PROGRESS NOTES
"Subjective   Jocelyn Gee is a 63 y.o. female.   Chief Complaint   Patient presents with   • URI      URI    This is a new problem. The current episode started yesterday. The problem has been gradually worsening. Maximum temperature: flet warm. The fever has been present for less than 1 day. Associated symptoms include congestion, coughing, headaches, rhinorrhea (yellow) and a sore throat. Pertinent negatives include no chest pain, ear pain, joint pain, nausea, rash or wheezing. She has tried nothing for the symptoms.        Jocelyn Gee  presents to [facility] with cc of congestion and cough for 2 days and says she felt like she had fever last night. Reviewed the PMFSH.  See ROS.    The following portions of the patient's history were reviewed and updated as appropriate: allergies, current medications, past family history, past medical history, past social history, past surgical history and problem list.    Review of Systems   Constitutional: Positive for fever. Negative for chills and fatigue.   HENT: Positive for congestion, rhinorrhea (yellow) and sore throat. Negative for ear pain and trouble swallowing.    Respiratory: Positive for cough and chest tightness. Negative for shortness of breath and wheezing.    Cardiovascular: Negative for chest pain.   Gastrointestinal: Negative for nausea.   Endocrine: Negative for cold intolerance.   Musculoskeletal: Negative for arthralgias and joint pain.   Skin: Negative for rash.   Neurological: Positive for headaches.       Visit Vitals  /70 (BP Location: Right arm, Patient Position: Sitting, Cuff Size: Adult)   Pulse 80   Temp 97.9 °F (36.6 °C) (Temporal)   Resp 18   Ht 167.6 cm (66\")   Wt 74 kg (163 lb 3.2 oz)   SpO2 98%   BMI 26.34 kg/m²       Objective     Current Outpatient Medications:   •  hydrochlorothiazide (HYDRODIURIL) 25 MG tablet, Take 1 tablet by mouth Daily., Disp: 30 tablet, Rfl: 5  •  levothyroxine (SYNTHROID, LEVOTHROID) 50 MCG tablet, Take 1 tablet by mouth " Daily., Disp: 30 tablet, Rfl: 5  •  losartan (COZAAR) 50 MG tablet, Take 1 tablet by mouth Daily., Disp: 30 tablet, Rfl: 6  •  simvastatin (ZOCOR) 20 MG tablet, Take 1 tablet by mouth Every Night., Disp: 90 tablet, Rfl: 3  •  fluticasone (FLONASE) 50 MCG/ACT nasal spray, 2 sprays into the nostril(s) as directed by provider Daily for 30 days., Disp: 1 bottle, Rfl: 0  •  guaiFENesin (MUCINEX) 600 MG 12 hr tablet, Take 2 tablets by mouth 2 (Two) Times a Day for 5 days., Disp: 20 tablet, Rfl: 0  •  MethylPREDNISolone (MEDROL, SEA,) 4 MG tablet, Take as directed on package instructions., Disp: 1 each, Rfl: 0  No Known Allergies    Physical Exam   Constitutional: She is oriented to person, place, and time. She appears well-developed and well-nourished. No distress.   HENT:   Head: Normocephalic and atraumatic.   Right Ear: Tympanic membrane and external ear normal.   Left Ear: Tympanic membrane and external ear normal.   Nose: Mucosal edema present. Right sinus exhibits no maxillary sinus tenderness and no frontal sinus tenderness. Left sinus exhibits no maxillary sinus tenderness and no frontal sinus tenderness.   Mouth/Throat: Uvula is midline and mucous membranes are normal. Posterior oropharyngeal erythema present. No oropharyngeal exudate. Tonsils are 0 on the right. Tonsils are 0 on the left. No tonsillar exudate.   Eyes: Conjunctivae and EOM are normal. Pupils are equal, round, and reactive to light.   Neck: Normal range of motion.   Cardiovascular: Normal rate, regular rhythm and normal heart sounds.   Pulmonary/Chest: Effort normal. No respiratory distress. She has wheezes (scatterd bilateral wheezing). She has no rales. She exhibits no tenderness.   Abdominal: Soft. Bowel sounds are normal. She exhibits no distension. There is no tenderness.   Musculoskeletal: Normal range of motion.   Lymphadenopathy:     She has no cervical adenopathy.   Neurological: She is alert and oriented to person, place, and time.    Skin: Skin is warm and dry.   Psychiatric: She has a normal mood and affect. Her behavior is normal. Judgment and thought content normal.   Nursing note and vitals reviewed.      Lab Results (last 24 hours)     Procedure Component Value Units Date/Time    POCT rapid strep A [[id number]]  (Normal) Collected:  01/16/19 1105    Specimen:  Swab Updated:  01/16/19 1105     Rapid Strep A Screen Negative     Internal Control Passed     Lot Number [id number]     Expiration Date 4/2,020          Assessment/Plan   Jocelyn was seen today for uri.    Diagnoses and all orders for this visit:    Acute URI  -     POCT rapid strep A  -     MethylPREDNISolone (MEDROL, SEA,) 4 MG tablet; Take as directed on package instructions.  -     guaiFENesin (MUCINEX) 600 MG 12 hr tablet; Take 2 tablets by mouth 2 (Two) Times a Day for 5 days.  -     fluticasone (FLONASE) 50 MCG/ACT nasal spray; 2 sprays into the nostril(s) as directed by provider Daily for 30 days.

## 2019-02-14 ENCOUNTER — OFFICE VISIT (OUTPATIENT)
Dept: FAMILY MEDICINE CLINIC | Facility: CLINIC | Age: 64
End: 2019-02-14

## 2019-02-14 VITALS
HEIGHT: 66 IN | WEIGHT: 159.8 LBS | HEART RATE: 77 BPM | TEMPERATURE: 97.4 F | SYSTOLIC BLOOD PRESSURE: 128 MMHG | DIASTOLIC BLOOD PRESSURE: 81 MMHG | BODY MASS INDEX: 25.68 KG/M2

## 2019-02-14 DIAGNOSIS — Z23 FLU VACCINE NEED: Primary | ICD-10-CM

## 2019-02-14 DIAGNOSIS — E78.49 OTHER HYPERLIPIDEMIA: ICD-10-CM

## 2019-02-14 DIAGNOSIS — I10 ESSENTIAL HYPERTENSION: ICD-10-CM

## 2019-02-14 DIAGNOSIS — E03.8 OTHER SPECIFIED HYPOTHYROIDISM: ICD-10-CM

## 2019-02-14 LAB
ALBUMIN SERPL-MCNC: 4.3 G/DL (ref 3.4–4.8)
ALBUMIN/GLOB SERPL: 1.6 G/DL (ref 1.5–2.5)
ALP SERPL-CCNC: 87 U/L (ref 35–104)
ALT SERPL W P-5'-P-CCNC: 20 U/L (ref 10–36)
ANION GAP SERPL CALCULATED.3IONS-SCNC: 2.2 MMOL/L (ref 3.6–11.2)
AST SERPL-CCNC: 18 U/L (ref 10–30)
BASOPHILS # BLD AUTO: 0.02 10*3/MM3 (ref 0–0.3)
BASOPHILS NFR BLD AUTO: 0.2 % (ref 0–2)
BILIRUB SERPL-MCNC: 0.3 MG/DL (ref 0.2–1.8)
BUN BLD-MCNC: 9 MG/DL (ref 7–21)
BUN/CREAT SERPL: 15 (ref 7–25)
CALCIUM SPEC-SCNC: 9.2 MG/DL (ref 7.7–10)
CHLORIDE SERPL-SCNC: 100 MMOL/L (ref 99–112)
CHOLEST SERPL-MCNC: 141 MG/DL (ref 0–200)
CO2 SERPL-SCNC: 33.8 MMOL/L (ref 24.3–31.9)
CREAT BLD-MCNC: 0.6 MG/DL (ref 0.43–1.29)
DEPRECATED RDW RBC AUTO: 45.3 FL (ref 37–54)
EOSINOPHIL # BLD AUTO: 0.15 10*3/MM3 (ref 0–0.7)
EOSINOPHIL NFR BLD AUTO: 1.8 % (ref 0–5)
ERYTHROCYTE [DISTWIDTH] IN BLOOD BY AUTOMATED COUNT: 13.9 % (ref 11.5–14.5)
GFR SERPL CREATININE-BSD FRML MDRD: 101 ML/MIN/1.73
GLOBULIN UR ELPH-MCNC: 2.7 GM/DL
GLUCOSE BLD-MCNC: 94 MG/DL (ref 70–110)
HCT VFR BLD AUTO: 44.1 % (ref 37–47)
HDLC SERPL-MCNC: 51 MG/DL (ref 60–100)
HGB BLD-MCNC: 15 G/DL (ref 12–16)
IMM GRANULOCYTES # BLD AUTO: 0.02 10*3/MM3 (ref 0–0.03)
IMM GRANULOCYTES NFR BLD AUTO: 0.2 % (ref 0–0.5)
LDLC SERPL CALC-MCNC: 75 MG/DL (ref 0–100)
LDLC/HDLC SERPL: 1.46 {RATIO}
LYMPHOCYTES # BLD AUTO: 2.55 10*3/MM3 (ref 1–3)
LYMPHOCYTES NFR BLD AUTO: 31.3 % (ref 21–51)
MCH RBC QN AUTO: 30.8 PG (ref 27–33)
MCHC RBC AUTO-ENTMCNC: 34 G/DL (ref 33–37)
MCV RBC AUTO: 90.6 FL (ref 80–94)
MONOCYTES # BLD AUTO: 0.84 10*3/MM3 (ref 0.1–0.9)
MONOCYTES NFR BLD AUTO: 10.3 % (ref 0–10)
NEUTROPHILS # BLD AUTO: 4.57 10*3/MM3 (ref 1.4–6.5)
NEUTROPHILS NFR BLD AUTO: 56.2 % (ref 30–70)
OSMOLALITY SERPL CALC.SUM OF ELEC: 270.4 MOSM/KG (ref 273–305)
PLATELET # BLD AUTO: 294 10*3/MM3 (ref 130–400)
PMV BLD AUTO: 10.5 FL (ref 6–10)
POTASSIUM BLD-SCNC: 3.3 MMOL/L (ref 3.5–5.3)
PROT SERPL-MCNC: 7 G/DL (ref 6–8)
RBC # BLD AUTO: 4.87 10*6/MM3 (ref 4.2–5.4)
SODIUM BLD-SCNC: 136 MMOL/L (ref 135–153)
TRIGL SERPL-MCNC: 77 MG/DL (ref 0–150)
TSH SERPL DL<=0.05 MIU/L-ACNC: 2.62 MIU/ML (ref 0.55–4.78)
VLDLC SERPL-MCNC: 15.4 MG/DL
WBC NRBC COR # BLD: 8.15 10*3/MM3 (ref 4.5–12.5)

## 2019-02-14 PROCEDURE — 90674 CCIIV4 VAC NO PRSV 0.5 ML IM: CPT | Performed by: FAMILY MEDICINE

## 2019-02-14 PROCEDURE — 80053 COMPREHEN METABOLIC PANEL: CPT | Performed by: FAMILY MEDICINE

## 2019-02-14 PROCEDURE — 85025 COMPLETE CBC W/AUTO DIFF WBC: CPT | Performed by: FAMILY MEDICINE

## 2019-02-14 PROCEDURE — 90471 IMMUNIZATION ADMIN: CPT | Performed by: FAMILY MEDICINE

## 2019-02-14 PROCEDURE — 99213 OFFICE O/P EST LOW 20 MIN: CPT | Performed by: FAMILY MEDICINE

## 2019-02-14 PROCEDURE — 84443 ASSAY THYROID STIM HORMONE: CPT | Performed by: FAMILY MEDICINE

## 2019-02-14 PROCEDURE — 36415 COLL VENOUS BLD VENIPUNCTURE: CPT | Performed by: FAMILY MEDICINE

## 2019-02-14 PROCEDURE — 80061 LIPID PANEL: CPT | Performed by: FAMILY MEDICINE

## 2019-02-14 RX ORDER — LOSARTAN POTASSIUM 50 MG/1
50 TABLET ORAL DAILY
Qty: 90 TABLET | Refills: 1 | Status: SHIPPED | OUTPATIENT
Start: 2019-02-14 | End: 2019-08-15 | Stop reason: SDUPTHER

## 2019-02-14 RX ORDER — LEVOTHYROXINE SODIUM 0.05 MG/1
50 TABLET ORAL DAILY
Qty: 90 TABLET | Refills: 1 | Status: SHIPPED | OUTPATIENT
Start: 2019-02-14 | End: 2019-08-15 | Stop reason: SDUPTHER

## 2019-02-14 RX ORDER — HYDROCHLOROTHIAZIDE 25 MG/1
25 TABLET ORAL DAILY
Qty: 90 TABLET | Refills: 1 | Status: SHIPPED | OUTPATIENT
Start: 2019-02-14 | End: 2019-08-15 | Stop reason: SDUPTHER

## 2019-02-14 RX ORDER — SIMVASTATIN 20 MG
20 TABLET ORAL NIGHTLY
Qty: 90 TABLET | Refills: 1 | Status: SHIPPED | OUTPATIENT
Start: 2019-02-14 | End: 2019-08-15 | Stop reason: SDUPTHER

## 2019-02-14 NOTE — PROGRESS NOTES
Subjective   Jocelyn Gee is a 63 y.o. female.     History of Present Illness  follow-up regarding hypertension dyslipidemia.  Generally having no new problems.  No recent illness.  Compliant with medicines as reconciled.  Continues to smoke but less.  Denies chest CDV GI  concerns.  Energy is fair.  Still works outside of home.    The following portions of the patient's history were reviewed and updated as appropriate: allergies, past family history, past medical history, past social history, past surgical history and problem list.    Review of Systems  see the history of present illness  Objective   Physical Exam   Constitutional: She is oriented to person, place, and time. She appears well-developed and well-nourished.   HENT:   Head: Normocephalic.   Mouth/Throat: Oropharynx is clear and moist.   Eyes: Conjunctivae and EOM are normal. Pupils are equal, round, and reactive to light.   Neck: Normal range of motion. Neck supple.   Cardiovascular: Normal rate, regular rhythm and normal heart sounds.   No murmur heard.  Pulmonary/Chest: Effort normal and breath sounds normal.   Musculoskeletal: She exhibits no edema.   Neurological: She is alert and oriented to person, place, and time.   Psychiatric: She has a normal mood and affect.   Vitals reviewed.      Assessment/Plan   Jocelyn was seen today for essential hypertension.    Diagnoses and all orders for this visit:    Flu vaccine need  -     Flucelvax Quad=>4Years (PFS)    Essential hypertension  -     hydrochlorothiazide (HYDRODIURIL) 25 MG tablet; Take 1 tablet by mouth Daily.  -     losartan (COZAAR) 50 MG tablet; Take 1 tablet by mouth Daily.  -     CBC & Differential  -     Comprehensive Metabolic Panel  -     CBC Auto Differential    Other hyperlipidemia  -     simvastatin (ZOCOR) 20 MG tablet; Take 1 tablet by mouth Every Night.  -     CBC & Differential  -     Comprehensive Metabolic Panel  -     Lipid Panel  -     CBC Auto Differential    Other specified  hypothyroidism  -     levothyroxine (SYNTHROID, LEVOTHROID) 50 MCG tablet; Take 1 tablet by mouth Daily.  -     TSH     overall you seem fairly stable.  Home BP reportedly good.  Will continue medications as reconciled ordered.  Check labs to monitor metabolic status.  Counseled regarding mammogram as well as consideration of low-dose CT imaging.  Lung cancer screening.  Recheck in about 6 months or as needed.    Patient's Body mass index is 25.8 kg/m². BMI is above normal parameters. Recommendations include: exercise counseling and nutrition counseling.

## 2019-02-18 RX ORDER — POTASSIUM CHLORIDE 750 MG/1
10 TABLET, EXTENDED RELEASE ORAL DAILY
Qty: 30 TABLET | Refills: 6 | Status: SHIPPED | OUTPATIENT
Start: 2019-02-18 | End: 2019-08-15 | Stop reason: SDUPTHER

## 2019-02-18 NOTE — PROGRESS NOTES
Thyroid level good.  Cholesterol profile better.  Chemistries are good except potassium slightly low.  An order for potassium has been sent to pharmacy.  Keep follow-up as scheduled and recommended.

## 2019-02-25 ENCOUNTER — TELEPHONE (OUTPATIENT)
Dept: FAMILY MEDICINE CLINIC | Facility: CLINIC | Age: 64
End: 2019-02-25

## 2019-02-25 NOTE — TELEPHONE ENCOUNTER
PER PTS REQUESTS I CONTACTED HER INS AND SPOKE WITH MARGO. SHE STATES THAT A SCREENING MAMMOGRAM AND LOW DOSE LUNG CT ARE A BENEFIT PAID % IF USING AN IN NETWORK PROVIDER AND ALL CRITERIA MET.  TRIED TO CALL PT BUT VOICE MAILBOX NOT SET UP.  WILL TRY AGAIN.

## 2019-04-09 ENCOUNTER — TELEPHONE (OUTPATIENT)
Dept: FAMILY MEDICINE CLINIC | Facility: CLINIC | Age: 64
End: 2019-04-09

## 2019-04-09 NOTE — TELEPHONE ENCOUNTER
CAN SHE TAKE TYLENOL ARTHRITIS MED WITH THE MEDS SHE IS TAKING. WRIST IS HURTING.   [phone]  REY RAMIREZ

## 2019-08-15 ENCOUNTER — OFFICE VISIT (OUTPATIENT)
Dept: FAMILY MEDICINE CLINIC | Facility: CLINIC | Age: 64
End: 2019-08-15

## 2019-08-15 VITALS
TEMPERATURE: 97.9 F | OXYGEN SATURATION: 97 % | WEIGHT: 157.8 LBS | SYSTOLIC BLOOD PRESSURE: 140 MMHG | DIASTOLIC BLOOD PRESSURE: 82 MMHG | BODY MASS INDEX: 25.36 KG/M2 | HEIGHT: 66 IN | HEART RATE: 78 BPM

## 2019-08-15 DIAGNOSIS — E03.8 OTHER SPECIFIED HYPOTHYROIDISM: ICD-10-CM

## 2019-08-15 DIAGNOSIS — Z12.11 COLON CANCER SCREENING: ICD-10-CM

## 2019-08-15 DIAGNOSIS — E78.49 OTHER HYPERLIPIDEMIA: ICD-10-CM

## 2019-08-15 DIAGNOSIS — I10 ESSENTIAL HYPERTENSION: Primary | ICD-10-CM

## 2019-08-15 PROCEDURE — 99213 OFFICE O/P EST LOW 20 MIN: CPT | Performed by: FAMILY MEDICINE

## 2019-08-15 PROCEDURE — 36415 COLL VENOUS BLD VENIPUNCTURE: CPT | Performed by: FAMILY MEDICINE

## 2019-08-15 PROCEDURE — 80048 BASIC METABOLIC PNL TOTAL CA: CPT | Performed by: FAMILY MEDICINE

## 2019-08-15 RX ORDER — POTASSIUM CHLORIDE 750 MG/1
10 TABLET, EXTENDED RELEASE ORAL DAILY
Qty: 90 TABLET | Refills: 1 | Status: SHIPPED | OUTPATIENT
Start: 2019-08-15 | End: 2020-02-18 | Stop reason: SDUPTHER

## 2019-08-15 RX ORDER — LEVOTHYROXINE SODIUM 0.05 MG/1
50 TABLET ORAL DAILY
Qty: 90 TABLET | Refills: 1 | Status: SHIPPED | OUTPATIENT
Start: 2019-08-15 | End: 2020-02-18 | Stop reason: SDUPTHER

## 2019-08-15 RX ORDER — SIMVASTATIN 20 MG
20 TABLET ORAL NIGHTLY
Qty: 90 TABLET | Refills: 1 | Status: SHIPPED | OUTPATIENT
Start: 2019-08-15 | End: 2020-02-18 | Stop reason: SDUPTHER

## 2019-08-15 RX ORDER — HYDROCHLOROTHIAZIDE 25 MG/1
25 TABLET ORAL DAILY
Qty: 90 TABLET | Refills: 1 | Status: SHIPPED | OUTPATIENT
Start: 2019-08-15 | End: 2020-02-18 | Stop reason: SDUPTHER

## 2019-08-15 RX ORDER — LOSARTAN POTASSIUM 50 MG/1
50 TABLET ORAL DAILY
Qty: 90 TABLET | Refills: 1 | Status: SHIPPED | OUTPATIENT
Start: 2019-08-15 | End: 2020-02-18 | Stop reason: SDUPTHER

## 2019-08-15 NOTE — PROGRESS NOTES
Subjective   Jocelyn Gee is a 63 y.o. female.     History of Present Illness follow-up regarding hypertension.  Essentially no new problems.  Still works on a regular basis.  Continues to smoke.  Utilizing medications without difficulties.  Has had no recent acute illness.  Denies chest CDV GI  orthopedic skin concerns.  PME maintenance is a little behind.  Try to maintain reasonable diet.  Trying to be physically active.    The following portions of the patient's history were reviewed and updated as appropriate: allergies, current medications, past medical history, past social history, past surgical history and problem list.    Review of Systems  See history of Present Illness     Objective     Physical Exam   Constitutional: She is oriented to person, place, and time. She appears well-developed and well-nourished.   HENT:   Head: Normocephalic.   Mouth/Throat: Oropharynx is clear and moist.   Eyes: Conjunctivae and EOM are normal. Pupils are equal, round, and reactive to light.   Neck: Normal range of motion. Neck supple.   Cardiovascular: Normal rate, regular rhythm and normal heart sounds.   No murmur heard.  Pulmonary/Chest: Effort normal and breath sounds normal.   Musculoskeletal: She exhibits no edema.   Neurological: She is alert and oriented to person, place, and time.   Psychiatric: She has a normal mood and affect.   Vitals reviewed.      PHQ-9 Total Score:      Patient's Body mass index is 25.48 kg/m². BMI is above normal parameters. Recommendations include: exercise counseling and nutrition counseling.   (Normal BMI:  18.5-24.9, OW 25-29.9, Obesity 30 or greater)      Assessment/Plan     Jocelyn was seen today for hypertension.    Diagnoses and all orders for this visit:    Essential hypertension  -     potassium chloride (K-DUR,KLOR-CON) 10 MEQ CR tablet; Take 1 tablet by mouth Daily.  -     losartan (COZAAR) 50 MG tablet; Take 1 tablet by mouth Daily.  -     hydrochlorothiazide (HYDRODIURIL) 25 MG  tablet; Take 1 tablet by mouth Daily.  -     Basic Metabolic Panel    Other hyperlipidemia  -     simvastatin (ZOCOR) 20 MG tablet; Take 1 tablet by mouth Every Night.    Other specified hypothyroidism  -     levothyroxine (SYNTHROID, LEVOTHROID) 50 MCG tablet; Take 1 tablet by mouth Daily.    Colon cancer screening    Will continue medications as reconciled ordered.  Stay safely active.  Maintain heart healthy diet.  Smoking cessation encouraged.  Counseled about screening.  He will look into mammography.  We will check stool for occult blood.  Will notify of lab results.  Recheck in about 6 months or as needed.                     This document has been electronically signed by Deepak Callaway MD   August 15, 2019 10:58 AM

## 2019-08-16 LAB
ANION GAP SERPL CALCULATED.3IONS-SCNC: 14.5 MMOL/L (ref 5–15)
BUN BLD-MCNC: 8 MG/DL (ref 8–23)
BUN/CREAT SERPL: 13.6 (ref 7–25)
CALCIUM SPEC-SCNC: 9.8 MG/DL (ref 8.6–10.5)
CHLORIDE SERPL-SCNC: 92 MMOL/L (ref 98–107)
CO2 SERPL-SCNC: 25.5 MMOL/L (ref 22–29)
CREAT BLD-MCNC: 0.59 MG/DL (ref 0.57–1)
GFR SERPL CREATININE-BSD FRML MDRD: 103 ML/MIN/1.73
GLUCOSE BLD-MCNC: 88 MG/DL (ref 65–99)
POTASSIUM BLD-SCNC: 3.8 MMOL/L (ref 3.5–5.2)
SODIUM BLD-SCNC: 132 MMOL/L (ref 136–145)

## 2019-08-19 NOTE — PROGRESS NOTES
Lab testing stable.  sodium very slightly below normal.  Increase non-water fluids.  Also have her do stool for occult blood check to screen for colon.

## 2020-02-18 ENCOUNTER — OFFICE VISIT (OUTPATIENT)
Dept: FAMILY MEDICINE CLINIC | Facility: CLINIC | Age: 65
End: 2020-02-18

## 2020-02-18 VITALS
OXYGEN SATURATION: 95 % | WEIGHT: 155 LBS | SYSTOLIC BLOOD PRESSURE: 123 MMHG | HEIGHT: 66 IN | HEART RATE: 86 BPM | DIASTOLIC BLOOD PRESSURE: 82 MMHG | BODY MASS INDEX: 24.91 KG/M2 | TEMPERATURE: 97.5 F

## 2020-02-18 DIAGNOSIS — E78.49 OTHER HYPERLIPIDEMIA: ICD-10-CM

## 2020-02-18 DIAGNOSIS — E03.8 OTHER SPECIFIED HYPOTHYROIDISM: ICD-10-CM

## 2020-02-18 DIAGNOSIS — I10 ESSENTIAL HYPERTENSION: ICD-10-CM

## 2020-02-18 DIAGNOSIS — Z23 FLU VACCINE NEED: Primary | ICD-10-CM

## 2020-02-18 PROCEDURE — 90471 IMMUNIZATION ADMIN: CPT | Performed by: FAMILY MEDICINE

## 2020-02-18 PROCEDURE — 84439 ASSAY OF FREE THYROXINE: CPT | Performed by: FAMILY MEDICINE

## 2020-02-18 PROCEDURE — 84443 ASSAY THYROID STIM HORMONE: CPT | Performed by: FAMILY MEDICINE

## 2020-02-18 PROCEDURE — 85025 COMPLETE CBC W/AUTO DIFF WBC: CPT | Performed by: FAMILY MEDICINE

## 2020-02-18 PROCEDURE — 90674 CCIIV4 VAC NO PRSV 0.5 ML IM: CPT | Performed by: FAMILY MEDICINE

## 2020-02-18 PROCEDURE — 80061 LIPID PANEL: CPT | Performed by: FAMILY MEDICINE

## 2020-02-18 PROCEDURE — 36415 COLL VENOUS BLD VENIPUNCTURE: CPT | Performed by: FAMILY MEDICINE

## 2020-02-18 PROCEDURE — 80053 COMPREHEN METABOLIC PANEL: CPT | Performed by: FAMILY MEDICINE

## 2020-02-18 PROCEDURE — 99213 OFFICE O/P EST LOW 20 MIN: CPT | Performed by: FAMILY MEDICINE

## 2020-02-18 RX ORDER — LOSARTAN POTASSIUM 50 MG/1
50 TABLET ORAL DAILY
Qty: 90 TABLET | Refills: 1 | Status: SHIPPED | OUTPATIENT
Start: 2020-02-18 | End: 2020-08-18 | Stop reason: SDUPTHER

## 2020-02-18 RX ORDER — POTASSIUM CHLORIDE 750 MG/1
10 TABLET, EXTENDED RELEASE ORAL DAILY
Qty: 90 TABLET | Refills: 1 | Status: SHIPPED | OUTPATIENT
Start: 2020-02-18 | End: 2020-08-18 | Stop reason: SDUPTHER

## 2020-02-18 RX ORDER — SIMVASTATIN 20 MG
20 TABLET ORAL NIGHTLY
Qty: 90 TABLET | Refills: 1 | Status: SHIPPED | OUTPATIENT
Start: 2020-02-18 | End: 2020-08-18 | Stop reason: SDUPTHER

## 2020-02-18 RX ORDER — HYDROCHLOROTHIAZIDE 25 MG/1
25 TABLET ORAL DAILY
Qty: 90 TABLET | Refills: 1 | Status: SHIPPED | OUTPATIENT
Start: 2020-02-18 | End: 2020-08-18 | Stop reason: SDUPTHER

## 2020-02-18 RX ORDER — LEVOTHYROXINE SODIUM 0.05 MG/1
50 TABLET ORAL DAILY
Qty: 90 TABLET | Refills: 1 | Status: SHIPPED | OUTPATIENT
Start: 2020-02-18 | End: 2020-08-18 | Stop reason: SDUPTHER

## 2020-02-18 NOTE — PROGRESS NOTES
Subjective   Jocelyn Gee is a 64 y.o. female.     History of Present Illness follow-up regarding hypertension dyslipidemia hypothyroid state.  Globally no new problems.  Continues to work full-time.  Very active at work.  Unfortunately continues to smoke.  Denies recent acute illness.  Denies respiratory CDV GI  skin concerns.  Has contemplating stopping smoking and was successful in past.    The following portions of the patient's history were reviewed and updated as appropriate: allergies, past family history, past medical history, past social history, past surgical history and problem list.    Review of Systems  See history of Present Illness     Objective     Physical Exam   Constitutional: She is oriented to person, place, and time. She appears well-developed and well-nourished.   HENT:   Head: Normocephalic.   Right Ear: External ear normal.   Left Ear: External ear normal.   Mouth/Throat: Oropharynx is clear and moist.   Eyes: Pupils are equal, round, and reactive to light. Conjunctivae and EOM are normal.   Neck: Normal range of motion. Neck supple.   Cardiovascular: Normal rate, regular rhythm and normal heart sounds.   No murmur heard.  Pulmonary/Chest: Effort normal and breath sounds normal.   Musculoskeletal: She exhibits no edema.   Neurological: She is alert and oriented to person, place, and time.   Psychiatric: She has a normal mood and affect.   Vitals reviewed.      PHQ-9 Total Score: 0    Patient's Body mass index is 25.03 kg/m². BMI is above normal parameters. Recommendations include: exercise counseling and nutrition counseling.   (Normal BMI:  18.5-24.9, OW 25-29.9, Obesity 30 or greater)      Assessment/Plan     Jocelyn was seen today for essential hypertension.    Diagnoses and all orders for this visit:    Flu vaccine need  -     Flucelvax Quad=>4Years (PFS)    Essential hypertension  -     hydroCHLOROthiazide (HYDRODIURIL) 25 MG tablet; Take 1 tablet by mouth Daily.  -     losartan (COZAAR)  50 MG tablet; Take 1 tablet by mouth Daily.  -     potassium chloride (K-DUR,KLOR-CON) 10 MEQ CR tablet; Take 1 tablet by mouth Daily.  -     CBC & Differential  -     Comprehensive Metabolic Panel  -     CBC Auto Differential    Other specified hypothyroidism  -     levothyroxine (SYNTHROID, LEVOTHROID) 50 MCG tablet; Take 1 tablet by mouth Daily.  -     TSH  -     T4, Free    Other hyperlipidemia  -     simvastatin (ZOCOR) 20 MG tablet; Take 1 tablet by mouth Every Night.  -     Comprehensive Metabolic Panel  -     Lipid Panel    Globally he seems stable.  We will continue medications as reconciled ordered.  Will check labs to monitor metabolically.  Counseled about smoking cessation.  You seem to be willing to try to give your self and your son a gift by stopping.  Encourage further PMA.  You did consent to flu shot today.  We will ask you to recheck in about 6 months or as needed.                     This document has been electronically signed by Deepak Callaway MD   February 18, 2020 2:10 PM

## 2020-02-19 LAB
ALBUMIN SERPL-MCNC: 4.8 G/DL (ref 3.5–5.2)
ALBUMIN/GLOB SERPL: 1.7 G/DL
ALP SERPL-CCNC: 69 U/L (ref 39–117)
ALT SERPL W P-5'-P-CCNC: 14 U/L (ref 1–33)
ANION GAP SERPL CALCULATED.3IONS-SCNC: 14.9 MMOL/L (ref 5–15)
AST SERPL-CCNC: 19 U/L (ref 1–32)
BASOPHILS # BLD AUTO: 0.05 10*3/MM3 (ref 0–0.2)
BASOPHILS NFR BLD AUTO: 0.7 % (ref 0–1.5)
BILIRUB SERPL-MCNC: 0.4 MG/DL (ref 0.2–1.2)
BUN BLD-MCNC: 16 MG/DL (ref 8–23)
BUN/CREAT SERPL: 25 (ref 7–25)
CALCIUM SPEC-SCNC: 10.1 MG/DL (ref 8.6–10.5)
CHLORIDE SERPL-SCNC: 96 MMOL/L (ref 98–107)
CHOLEST SERPL-MCNC: 144 MG/DL (ref 0–200)
CO2 SERPL-SCNC: 26.1 MMOL/L (ref 22–29)
CREAT BLD-MCNC: 0.64 MG/DL (ref 0.57–1)
DEPRECATED RDW RBC AUTO: 43.9 FL (ref 37–54)
EOSINOPHIL # BLD AUTO: 0.05 10*3/MM3 (ref 0–0.4)
EOSINOPHIL NFR BLD AUTO: 0.7 % (ref 0.3–6.2)
ERYTHROCYTE [DISTWIDTH] IN BLOOD BY AUTOMATED COUNT: 13.1 % (ref 12.3–15.4)
GFR SERPL CREATININE-BSD FRML MDRD: 93 ML/MIN/1.73
GLOBULIN UR ELPH-MCNC: 2.8 GM/DL
GLUCOSE BLD-MCNC: 90 MG/DL (ref 65–99)
HCT VFR BLD AUTO: 47.8 % (ref 34–46.6)
HDLC SERPL-MCNC: 56 MG/DL (ref 40–60)
HGB BLD-MCNC: 16.4 G/DL (ref 12–15.9)
IMM GRANULOCYTES # BLD AUTO: 0.01 10*3/MM3 (ref 0–0.05)
IMM GRANULOCYTES NFR BLD AUTO: 0.1 % (ref 0–0.5)
LDLC SERPL CALC-MCNC: 74 MG/DL (ref 0–100)
LDLC/HDLC SERPL: 1.31 {RATIO}
LYMPHOCYTES # BLD AUTO: 3.6 10*3/MM3 (ref 0.7–3.1)
LYMPHOCYTES NFR BLD AUTO: 51.1 % (ref 19.6–45.3)
MCH RBC QN AUTO: 31.4 PG (ref 26.6–33)
MCHC RBC AUTO-ENTMCNC: 34.3 G/DL (ref 31.5–35.7)
MCV RBC AUTO: 91.4 FL (ref 79–97)
MONOCYTES # BLD AUTO: 0.77 10*3/MM3 (ref 0.1–0.9)
MONOCYTES NFR BLD AUTO: 10.9 % (ref 5–12)
NEUTROPHILS # BLD AUTO: 2.57 10*3/MM3 (ref 1.7–7)
NEUTROPHILS NFR BLD AUTO: 36.5 % (ref 42.7–76)
NRBC BLD AUTO-RTO: 0 /100 WBC (ref 0–0.2)
PLATELET # BLD AUTO: 271 10*3/MM3 (ref 140–450)
PMV BLD AUTO: 11.3 FL (ref 6–12)
POTASSIUM BLD-SCNC: 3.5 MMOL/L (ref 3.5–5.2)
PROT SERPL-MCNC: 7.6 G/DL (ref 6–8.5)
RBC # BLD AUTO: 5.23 10*6/MM3 (ref 3.77–5.28)
SODIUM BLD-SCNC: 137 MMOL/L (ref 136–145)
T4 FREE SERPL-MCNC: 1.58 NG/DL (ref 0.93–1.7)
TRIGL SERPL-MCNC: 72 MG/DL (ref 0–150)
TSH SERPL DL<=0.05 MIU/L-ACNC: 4.49 UIU/ML (ref 0.27–4.2)
VLDLC SERPL-MCNC: 14.4 MG/DL (ref 5–40)
WBC NRBC COR # BLD: 7.05 10*3/MM3 (ref 3.4–10.8)

## 2020-08-18 ENCOUNTER — OFFICE VISIT (OUTPATIENT)
Dept: FAMILY MEDICINE CLINIC | Facility: CLINIC | Age: 65
End: 2020-08-18

## 2020-08-18 VITALS
SYSTOLIC BLOOD PRESSURE: 114 MMHG | WEIGHT: 150 LBS | RESPIRATION RATE: 18 BRPM | HEIGHT: 66 IN | TEMPERATURE: 96.9 F | DIASTOLIC BLOOD PRESSURE: 75 MMHG | HEART RATE: 86 BPM | OXYGEN SATURATION: 95 % | BODY MASS INDEX: 24.11 KG/M2

## 2020-08-18 DIAGNOSIS — I10 ESSENTIAL HYPERTENSION: Primary | ICD-10-CM

## 2020-08-18 DIAGNOSIS — E03.4 HYPOTHYROIDISM DUE TO ACQUIRED ATROPHY OF THYROID: ICD-10-CM

## 2020-08-18 DIAGNOSIS — E78.49 OTHER HYPERLIPIDEMIA: ICD-10-CM

## 2020-08-18 PROCEDURE — 36415 COLL VENOUS BLD VENIPUNCTURE: CPT | Performed by: FAMILY MEDICINE

## 2020-08-18 PROCEDURE — 80061 LIPID PANEL: CPT | Performed by: FAMILY MEDICINE

## 2020-08-18 PROCEDURE — 84439 ASSAY OF FREE THYROXINE: CPT | Performed by: FAMILY MEDICINE

## 2020-08-18 PROCEDURE — 99213 OFFICE O/P EST LOW 20 MIN: CPT | Performed by: FAMILY MEDICINE

## 2020-08-18 PROCEDURE — 80053 COMPREHEN METABOLIC PANEL: CPT | Performed by: FAMILY MEDICINE

## 2020-08-18 PROCEDURE — 84443 ASSAY THYROID STIM HORMONE: CPT | Performed by: FAMILY MEDICINE

## 2020-08-18 RX ORDER — LEVOTHYROXINE SODIUM 0.05 MG/1
50 TABLET ORAL DAILY
Qty: 90 TABLET | Refills: 1 | Status: SHIPPED | OUTPATIENT
Start: 2020-08-18 | End: 2021-03-08 | Stop reason: SDUPTHER

## 2020-08-18 RX ORDER — POTASSIUM CHLORIDE 750 MG/1
10 TABLET, EXTENDED RELEASE ORAL DAILY
Qty: 90 TABLET | Refills: 1 | Status: SHIPPED | OUTPATIENT
Start: 2020-08-18 | End: 2020-08-19 | Stop reason: SDUPTHER

## 2020-08-18 RX ORDER — SIMVASTATIN 20 MG
20 TABLET ORAL NIGHTLY
Qty: 90 TABLET | Refills: 1 | Status: SHIPPED | OUTPATIENT
Start: 2020-08-18 | End: 2021-03-08 | Stop reason: SDUPTHER

## 2020-08-18 RX ORDER — LOSARTAN POTASSIUM 50 MG/1
50 TABLET ORAL DAILY
Qty: 90 TABLET | Refills: 1 | Status: SHIPPED | OUTPATIENT
Start: 2020-08-18 | End: 2021-02-15 | Stop reason: SDUPTHER

## 2020-08-18 RX ORDER — HYDROCHLOROTHIAZIDE 25 MG/1
25 TABLET ORAL DAILY
Qty: 90 TABLET | Refills: 1 | Status: SHIPPED | OUTPATIENT
Start: 2020-08-18 | End: 2021-03-08 | Stop reason: SDUPTHER

## 2020-08-18 NOTE — PROGRESS NOTES
Subjective   Jocelyn Gee is a 65 y.o. female.     History of Present Illness follow-up hypertension dyslipidemia.  Working.  Continues to smoke.  No new problems.  Understands recommendations regarding PMA.  Denies recent acute illness.  Denies respiratory CDV GI .    The following portions of the patient's history were reviewed and updated as appropriate: allergies, current medications, past family history, past social history, past surgical history and problem list.    Review of Systems  See history of Present Illness     Objective     Physical Exam   Constitutional: She is oriented to person, place, and time. She appears well-developed and well-nourished.   HENT:   Head: Normocephalic.   Eyes: Pupils are equal, round, and reactive to light. Conjunctivae and EOM are normal.   Neck: Normal range of motion. Neck supple.   Cardiovascular: Normal rate, regular rhythm and normal heart sounds.   No murmur heard.  Pulmonary/Chest: Effort normal and breath sounds normal.   Abdominal: Soft. There is no tenderness.   Musculoskeletal: She exhibits no edema.   Neurological: She is alert and oriented to person, place, and time.   Psychiatric: She has a normal mood and affect.   Vitals reviewed.      PHQ-9 Total Score:      Patient's Body mass index is 24.22 kg/m². BMI is within normal parameters. No follow-up required..   (Normal BMI:  18.5-24.9, OW 25-29.9, Obesity 30 or greater)      Assessment/Plan     Jocelyn was seen today for hypertension.    Diagnoses and all orders for this visit:    Essential hypertension  -     hydroCHLOROthiazide (HYDRODIURIL) 25 MG tablet; Take 1 tablet by mouth Daily.  -     losartan (COZAAR) 50 MG tablet; Take 1 tablet by mouth Daily.  -     potassium chloride (K-DUR,KLOR-CON) 10 MEQ CR tablet; Take 1 tablet by mouth Daily.  -     Comprehensive Metabolic Panel    Other hyperlipidemia  -     simvastatin (ZOCOR) 20 MG tablet; Take 1 tablet by mouth Every Night.  -     Comprehensive Metabolic Panel  -      Lipid Panel    Hypothyroidism due to acquired atrophy of thyroid  -     levothyroxine (SYNTHROID, LEVOTHROID) 50 MCG tablet; Take 1 tablet by mouth Daily.  -     TSH  -     T4, Free    Stable.  Check labs.  Meds same.  Stop smoking.  Maintain aggressive TLC.  Maintain pandemic response.  Flu shot sooner.  Recheck 6 months or as needed.                     This document has been electronically signed by Deepak Callaway MD   August 18, 2020 14:36

## 2020-08-19 DIAGNOSIS — I10 ESSENTIAL HYPERTENSION: ICD-10-CM

## 2020-08-19 LAB
ALBUMIN SERPL-MCNC: 4.2 G/DL (ref 3.5–5.2)
ALBUMIN/GLOB SERPL: 1.4 G/DL
ALP SERPL-CCNC: 60 U/L (ref 39–117)
ALT SERPL W P-5'-P-CCNC: 13 U/L (ref 1–33)
ANION GAP SERPL CALCULATED.3IONS-SCNC: 10.9 MMOL/L (ref 5–15)
AST SERPL-CCNC: 13 U/L (ref 1–32)
BILIRUB SERPL-MCNC: 0.4 MG/DL (ref 0–1.2)
BUN SERPL-MCNC: 13 MG/DL (ref 8–23)
BUN/CREAT SERPL: 18.6 (ref 7–25)
CALCIUM SPEC-SCNC: 9.8 MG/DL (ref 8.6–10.5)
CHLORIDE SERPL-SCNC: 96 MMOL/L (ref 98–107)
CHOLEST SERPL-MCNC: 151 MG/DL (ref 0–200)
CO2 SERPL-SCNC: 26.1 MMOL/L (ref 22–29)
CREAT SERPL-MCNC: 0.7 MG/DL (ref 0.57–1)
GFR SERPL CREATININE-BSD FRML MDRD: 84 ML/MIN/1.73
GLOBULIN UR ELPH-MCNC: 3 GM/DL
GLUCOSE SERPL-MCNC: 97 MG/DL (ref 65–99)
HDLC SERPL-MCNC: 46 MG/DL (ref 40–60)
LDLC SERPL CALC-MCNC: 83 MG/DL (ref 0–100)
LDLC/HDLC SERPL: 1.81 {RATIO}
POTASSIUM SERPL-SCNC: 3.2 MMOL/L (ref 3.5–5.2)
PROT SERPL-MCNC: 7.2 G/DL (ref 6–8.5)
SODIUM SERPL-SCNC: 133 MMOL/L (ref 136–145)
T4 FREE SERPL-MCNC: 1.58 NG/DL (ref 0.93–1.7)
TRIGL SERPL-MCNC: 108 MG/DL (ref 0–150)
TSH SERPL DL<=0.05 MIU/L-ACNC: 3.03 UIU/ML (ref 0.27–4.2)
VLDLC SERPL-MCNC: 21.6 MG/DL (ref 5–40)

## 2020-08-19 RX ORDER — POTASSIUM CHLORIDE 750 MG/1
10 TABLET, EXTENDED RELEASE ORAL 2 TIMES DAILY
Qty: 180 TABLET | Refills: 1 | Status: SHIPPED | OUTPATIENT
Start: 2020-08-19 | End: 2021-03-08 | Stop reason: SDUPTHER

## 2020-08-19 NOTE — PROGRESS NOTES
Lab testing all good except potassium slightly low.  Start taking the potassium twice daily.  New prescription has been sent in.  Thyroid good.  Cholesterol profile good.  Work hard on diet.  Stop smoking if at all possible.

## 2021-02-15 DIAGNOSIS — I10 ESSENTIAL HYPERTENSION: ICD-10-CM

## 2021-02-15 RX ORDER — LOSARTAN POTASSIUM 50 MG/1
50 TABLET ORAL DAILY
Qty: 90 TABLET | Refills: 1 | Status: SHIPPED | OUTPATIENT
Start: 2021-02-15 | End: 2021-03-08 | Stop reason: SDUPTHER

## 2021-02-15 NOTE — TELEPHONE ENCOUNTER
Caller: Carlos Gee    Relationship: Self    Best call back number: [phone]    Medication needed:   Requested Prescriptions     Pending Prescriptions Disp Refills   • losartan (COZAAR) 50 MG tablet 90 tablet 1     Sig: Take 1 tablet by mouth Daily.       When do you need the refill by: 02/15/21.  PLEASE CARLOS WHEN SENT IN.    Does the patient have less than a 3 day supply:  [x] Yes  [] No    What is the patient's preferred pharmacy: [place] [address] - [phone] [facility] [phone]

## 2021-03-04 ENCOUNTER — OFFICE VISIT (OUTPATIENT)
Dept: FAMILY MEDICINE CLINIC | Facility: CLINIC | Age: 66
End: 2021-03-04

## 2021-03-04 VITALS
OXYGEN SATURATION: 98 % | DIASTOLIC BLOOD PRESSURE: 80 MMHG | BODY MASS INDEX: 27.35 KG/M2 | HEIGHT: 66 IN | HEART RATE: 92 BPM | SYSTOLIC BLOOD PRESSURE: 123 MMHG | TEMPERATURE: 97.3 F | WEIGHT: 170.2 LBS

## 2021-03-04 DIAGNOSIS — I10 ESSENTIAL HYPERTENSION: Chronic | ICD-10-CM

## 2021-03-04 DIAGNOSIS — E78.49 OTHER HYPERLIPIDEMIA: Primary | Chronic | ICD-10-CM

## 2021-03-04 DIAGNOSIS — E03.4 HYPOTHYROIDISM DUE TO ACQUIRED ATROPHY OF THYROID: Chronic | ICD-10-CM

## 2021-03-04 DIAGNOSIS — L20.89 OTHER ATOPIC DERMATITIS: ICD-10-CM

## 2021-03-04 PROCEDURE — 80053 COMPREHEN METABOLIC PANEL: CPT | Performed by: FAMILY MEDICINE

## 2021-03-04 PROCEDURE — 99214 OFFICE O/P EST MOD 30 MIN: CPT | Performed by: FAMILY MEDICINE

## 2021-03-04 PROCEDURE — 84439 ASSAY OF FREE THYROXINE: CPT | Performed by: FAMILY MEDICINE

## 2021-03-04 PROCEDURE — 36415 COLL VENOUS BLD VENIPUNCTURE: CPT | Performed by: FAMILY MEDICINE

## 2021-03-04 PROCEDURE — 80061 LIPID PANEL: CPT | Performed by: FAMILY MEDICINE

## 2021-03-04 PROCEDURE — 84443 ASSAY THYROID STIM HORMONE: CPT | Performed by: FAMILY MEDICINE

## 2021-03-04 NOTE — PROGRESS NOTES
Subjective   Jocelyn Gee is a 65 y.o. female.     History of Present Illness follow-up regarding hypertension dyslipidemia hypothyroid state.  Medication management.  Has now retired.  Active but not aggressively.  Reasonable diet.  Continues to smoke.  Maintaining aggressive pandemic response.  Has had no acute illness or exposures.  Denies respiratory CDV GI  orthopedic concerns no current skin changes.  Has had the persistent rash episodically with anxiety.  Screening exams have been postponed secondary to economics.    The following portions of the patient's history were reviewed and updated as appropriate: allergies, past family history, past medical history, past social history, past surgical history and problem list.    Review of Systems  See history of Present Illness     Objective     Physical Exam  Vitals signs reviewed.   Constitutional:       Appearance: Normal appearance.   HENT:      Head: Normocephalic.      Right Ear: External ear normal.      Left Ear: External ear normal.   Eyes:      Conjunctiva/sclera: Conjunctivae normal.   Neck:      Musculoskeletal: Normal range of motion and neck supple.   Cardiovascular:      Rate and Rhythm: Normal rate and regular rhythm.      Heart sounds: Normal heart sounds.   Pulmonary:      Effort: Pulmonary effort is normal.      Breath sounds: Normal breath sounds.   Skin:     General: Skin is warm and dry.      Comments: Minimally scaly eruption on hands Palmar   Neurological:      Mental Status: She is alert and oriented to person, place, and time.   Psychiatric:         Mood and Affect: Mood normal.         PHQ-9 Total Score: 0    Patient's Body mass index is 27.48 kg/m². BMI is above normal parameters. Recommendations include: exercise counseling and nutrition counseling.   (Normal BMI:  18.5-24.9, OW 25-29.9, Obesity 30 or greater)      Assessment/Plan     Diagnoses and all orders for this visit:    1. Other hyperlipidemia (Primary)  -     Comprehensive  Metabolic Panel  -     Lipid Panel    2. Essential hypertension  -     Comprehensive Metabolic Panel    3. Hypothyroidism due to acquired atrophy of thyroid  -     T4, Free  -     TSH    4. Other atopic dermatitis    Encourage aggressive TLC.  Smoking cessation encouraged.  Encourage Covid vaccine.  I prefer you get that as soon as possible and defer flu shot.  Discussed mammogram and colon cancer screenings.  Stay safely active maintain heart healthy diet.  Will notify of lab results and make medication adjustments and renewals.  Would recheck in about 6 months or as needed.  Maintain pandemic response.                     This document has been electronically signed by Deepak Callaway MD   March 4, 2021 10:49 EST    Part of this note may be an electronic transcription/translation of spoken language to printed text using the Dragon Dictation System.

## 2021-03-05 LAB
ALBUMIN SERPL-MCNC: 4.2 G/DL (ref 3.5–5.2)
ALBUMIN/GLOB SERPL: 1.5 G/DL
ALP SERPL-CCNC: 81 U/L (ref 39–117)
ALT SERPL W P-5'-P-CCNC: 21 U/L (ref 1–33)
ANION GAP SERPL CALCULATED.3IONS-SCNC: 10.6 MMOL/L (ref 5–15)
AST SERPL-CCNC: 20 U/L (ref 1–32)
BILIRUB SERPL-MCNC: 0.3 MG/DL (ref 0–1.2)
BUN SERPL-MCNC: 8 MG/DL (ref 8–23)
BUN/CREAT SERPL: 12.3 (ref 7–25)
CALCIUM SPEC-SCNC: 9.1 MG/DL (ref 8.6–10.5)
CHLORIDE SERPL-SCNC: 94 MMOL/L (ref 98–107)
CHOLEST SERPL-MCNC: 150 MG/DL (ref 0–200)
CO2 SERPL-SCNC: 27.4 MMOL/L (ref 22–29)
CREAT SERPL-MCNC: 0.65 MG/DL (ref 0.57–1)
GFR SERPL CREATININE-BSD FRML MDRD: 91 ML/MIN/1.73
GLOBULIN UR ELPH-MCNC: 2.8 GM/DL
GLUCOSE SERPL-MCNC: 89 MG/DL (ref 65–99)
HDLC SERPL-MCNC: 49 MG/DL (ref 40–60)
LDLC SERPL CALC-MCNC: 79 MG/DL (ref 0–100)
LDLC/HDLC SERPL: 1.56 {RATIO}
POTASSIUM SERPL-SCNC: 4.1 MMOL/L (ref 3.5–5.2)
PROT SERPL-MCNC: 7 G/DL (ref 6–8.5)
SODIUM SERPL-SCNC: 132 MMOL/L (ref 136–145)
T4 FREE SERPL-MCNC: 1.35 NG/DL (ref 0.93–1.7)
TRIGL SERPL-MCNC: 122 MG/DL (ref 0–150)
TSH SERPL DL<=0.05 MIU/L-ACNC: 4.42 UIU/ML (ref 0.27–4.2)
VLDLC SERPL-MCNC: 22 MG/DL (ref 5–40)

## 2021-03-08 DIAGNOSIS — E03.4 HYPOTHYROIDISM DUE TO ACQUIRED ATROPHY OF THYROID: ICD-10-CM

## 2021-03-08 DIAGNOSIS — E78.49 OTHER HYPERLIPIDEMIA: ICD-10-CM

## 2021-03-08 DIAGNOSIS — I10 ESSENTIAL HYPERTENSION: ICD-10-CM

## 2021-03-08 RX ORDER — HYDROCHLOROTHIAZIDE 25 MG/1
25 TABLET ORAL DAILY
Qty: 90 TABLET | Refills: 1 | Status: SHIPPED | OUTPATIENT
Start: 2021-03-08 | End: 2021-09-02 | Stop reason: SDUPTHER

## 2021-03-08 RX ORDER — SIMVASTATIN 20 MG
20 TABLET ORAL NIGHTLY
Qty: 90 TABLET | Refills: 1 | Status: SHIPPED | OUTPATIENT
Start: 2021-03-08 | End: 2022-03-10 | Stop reason: SINTOL

## 2021-03-08 RX ORDER — LEVOTHYROXINE SODIUM 0.05 MG/1
50 TABLET ORAL DAILY
Qty: 90 TABLET | Refills: 1 | Status: SHIPPED | OUTPATIENT
Start: 2021-03-08 | End: 2021-09-02 | Stop reason: SDUPTHER

## 2021-03-08 RX ORDER — POTASSIUM CHLORIDE 750 MG/1
10 TABLET, EXTENDED RELEASE ORAL 2 TIMES DAILY
Qty: 180 TABLET | Refills: 1 | Status: SHIPPED | OUTPATIENT
Start: 2021-03-08 | End: 2021-09-02 | Stop reason: SDUPTHER

## 2021-03-08 RX ORDER — LOSARTAN POTASSIUM 50 MG/1
50 TABLET ORAL DAILY
Qty: 90 TABLET | Refills: 1 | Status: SHIPPED | OUTPATIENT
Start: 2021-03-08 | End: 2021-09-02 | Stop reason: SDUPTHER

## 2021-03-08 NOTE — PROGRESS NOTES
Labs very stable.  Potassium good.  Continue all medication at current dosing.  Stop smoking.  Recheck as scheduled.

## 2021-03-09 ENCOUNTER — TELEPHONE (OUTPATIENT)
Dept: FAMILY MEDICINE CLINIC | Facility: CLINIC | Age: 66
End: 2021-03-09

## 2021-03-09 DIAGNOSIS — L20.82 FLEXURAL ECZEMA: ICD-10-CM

## 2021-03-09 RX ORDER — CLOBETASOL PROPIONATE 0.5 MG/G
OINTMENT TOPICAL 2 TIMES DAILY PRN
Qty: 60 G | Refills: 0 | Status: SHIPPED | OUTPATIENT
Start: 2021-03-09 | End: 2023-03-21 | Stop reason: SDUPTHER

## 2021-04-29 ENCOUNTER — TELEPHONE (OUTPATIENT)
Dept: FAMILY MEDICINE CLINIC | Facility: CLINIC | Age: 66
End: 2021-04-29

## 2021-04-29 NOTE — TELEPHONE ENCOUNTER
Called patient at [phone]. She is not able to do a video visit. Appointment scheduled for tomorrow. If patient is able she will come in, also looking for family member to help bring her to clinic.

## 2021-04-29 NOTE — TELEPHONE ENCOUNTER
Called patient at [phone]. I informed that patient that Dr. Callaway is not in the clinic today but she can come in to see Candy. Patient states that pain in knees and lower back is pretty bad, does not think that she can drive.     Please advise.

## 2021-04-29 NOTE — TELEPHONE ENCOUNTER
Caller: Jocelyn Gee    Relationship: Self    Best call back number: [phone]     What was the call regarding: PATIENT CALLED STATING THAT SHE IS TAKING TYLENOL ARTHRITIS FOR PAIN.  PATIENT STATED THAT IT IS NOT WORKING AND THAT SHE IS HAVING PAIN IN HER KNEES AND BACK. PATIENT STATED THAT SHE CAN NOT STAND UP STRAIGHT.  PATIENT ASKED IF THERE IS ANYTHING ELSE SHE CAN TAKE TO HELP WITH THE PAIN THAT WILL NOT INTERFERE WITH HER OTHER MEDICATIONS.    Do you require a callback: YES

## 2021-04-30 ENCOUNTER — OFFICE VISIT (OUTPATIENT)
Dept: FAMILY MEDICINE CLINIC | Facility: CLINIC | Age: 66
End: 2021-04-30

## 2021-04-30 VITALS
SYSTOLIC BLOOD PRESSURE: 120 MMHG | TEMPERATURE: 97.7 F | HEIGHT: 66 IN | WEIGHT: 170 LBS | RESPIRATION RATE: 18 BRPM | DIASTOLIC BLOOD PRESSURE: 80 MMHG | BODY MASS INDEX: 27.32 KG/M2 | HEART RATE: 91 BPM | OXYGEN SATURATION: 97 %

## 2021-04-30 DIAGNOSIS — M13.0 POLYARTHRITIS: Primary | ICD-10-CM

## 2021-04-30 PROCEDURE — 99213 OFFICE O/P EST LOW 20 MIN: CPT | Performed by: NURSE PRACTITIONER

## 2021-04-30 PROCEDURE — 96372 THER/PROPH/DIAG INJ SC/IM: CPT | Performed by: NURSE PRACTITIONER

## 2021-04-30 RX ORDER — METHYLPREDNISOLONE ACETATE 80 MG/ML
80 INJECTION, SUSPENSION INTRA-ARTICULAR; INTRALESIONAL; INTRAMUSCULAR; SOFT TISSUE ONCE
Status: COMPLETED | OUTPATIENT
Start: 2021-04-30 | End: 2021-04-30

## 2021-04-30 RX ADMIN — METHYLPREDNISOLONE ACETATE 80 MG: 80 INJECTION, SUSPENSION INTRA-ARTICULAR; INTRALESIONAL; INTRAMUSCULAR; SOFT TISSUE at 10:51

## 2021-04-30 NOTE — PROGRESS NOTES
Subjective   Jocelyn Gee is a 65 y.o. female.     Chief Complaint   Patient presents with   • Back Pain       She presents with c/o arthritis pain. She c/o pain in her knees, her right shoulder, her hands. She states her hands swell up. She c/o pain in her back. She was scared to drive because of the pain yesterday. She has had the pain for quite a while. She has worked at [employer] for 25 years but she has retired now. Pain is intermittent. She was taking tylenol arthritis but its not helping like it did at first. Pain is described as aching. It was sharp yesterday if she moved a certain way. Her hands swell sometimes and she can't  toilet paper but she wakes up the next day and they are fine. She has a history of 3 broken arms and a cracked knee. She c/o joint stiffness worse in the evening.        The following portions of the patient's history were reviewed and updated as appropriate: allergies, current medications, past family history, past medical history, past social history, past surgical history and problem list.    Review of Systems   Constitutional: Negative for fatigue, fever and unexpected weight change.   HENT: Negative for ear pain, rhinorrhea and sore throat.    Eyes: Negative for visual disturbance.   Respiratory: Positive for cough. Negative for chest tightness (smoking) and shortness of breath.    Cardiovascular: Negative for chest pain, palpitations and leg swelling.   Gastrointestinal: Negative for abdominal pain, blood in stool, constipation, diarrhea, nausea and vomiting.   Endocrine: Negative for cold intolerance and heat intolerance.   Genitourinary: Negative for dysuria and hematuria.   Musculoskeletal: Positive for arthralgias, back pain and joint swelling. Negative for myalgias.   Skin: Negative for color change.   Allergic/Immunologic: Negative for environmental allergies.   Neurological: Positive for headaches (no changes). Negative for dizziness.   Hematological: Negative for  "adenopathy.   Psychiatric/Behavioral: Negative for suicidal ideas. The patient is not nervous/anxious.        Objective     /80 (BP Location: Right arm, Patient Position: Sitting, Cuff Size: Adult)   Pulse 91   Temp 97.7 °F (36.5 °C) (Temporal)   Resp 18   Ht 167.6 cm (65.98\")   Wt 77.1 kg (170 lb)   SpO2 97%   BMI 27.45 kg/m²     Physical Exam  Vitals reviewed.   Constitutional:       General: She is not in acute distress.     Appearance: Normal appearance. She is well-developed. She is not diaphoretic.   HENT:      Head: Normocephalic and atraumatic.   Cardiovascular:      Rate and Rhythm: Normal rate and regular rhythm.      Heart sounds: Normal heart sounds, S1 normal and S2 normal. No murmur heard.   No friction rub. No gallop.    Pulmonary:      Effort: Pulmonary effort is normal. No respiratory distress.      Breath sounds: Normal breath sounds.   Abdominal:      General: Bowel sounds are normal. There is no distension.      Palpations: Abdomen is soft.      Tenderness: There is no abdominal tenderness.   Musculoskeletal:      Comments: Arthritic node in right thumb metacarpal joint. Crepitus on ROM of right knee.   Skin:     General: Skin is warm and dry.   Neurological:      Mental Status: She is alert and oriented to person, place, and time.   Psychiatric:         Behavior: Behavior normal.         Thought Content: Thought content normal.         Judgment: Judgment normal.         Assessment/Plan     Problem List Items Addressed This Visit     None      Visit Diagnoses     Polyarthritis    -  Primary    Relevant Medications    diclofenac (VOLTAREN) 50 MG EC tablet    methylPREDNISolone acetate (DEPO-medrol) injection 80 mg (Completed)          Plan: Depo-medrol ordered for joint pain. Diclofenac ordered to be taken sparingly and only as needed for joint pain and swelling. I explained that diclofenac can cause kidney damage if taken daily for long periods of time. Keep scheduled follow up.     "     Patient's Body mass index is 27.45 kg/m². BMI is above normal parameters. Recommendations include: educational material.   (Normal BMI:  18.5-24.9, OW 25-29.9, Obesity 30 or greater)    Jocelyn Gee  reports that she has been smoking cigarettes. She started smoking about 47 years ago. She has a 21.50 pack-year smoking history. She has never used smokeless tobacco.. I have educated her on the risk of diseases from using tobacco products such as cancer, COPD and heart disease.     I advised her to quit and she is not willing to quit.               Understands disease processes and need for medications.  Understands reasons for urgent and emergent care.  Patient (& family) verbalized agreement for treatment plan.   Emotional support and active listening provided.  Patient provided time to verbalize feelings.               This document has been electronically signed by CHACHA Jaimes   April 30, 2021 11:07 EDT

## 2021-04-30 NOTE — PATIENT INSTRUCTIONS

## 2021-09-02 ENCOUNTER — OFFICE VISIT (OUTPATIENT)
Dept: FAMILY MEDICINE CLINIC | Facility: CLINIC | Age: 66
End: 2021-09-02

## 2021-09-02 VITALS
OXYGEN SATURATION: 96 % | TEMPERATURE: 96.6 F | BODY MASS INDEX: 28 KG/M2 | SYSTOLIC BLOOD PRESSURE: 156 MMHG | WEIGHT: 174.2 LBS | DIASTOLIC BLOOD PRESSURE: 88 MMHG | HEIGHT: 66 IN | HEART RATE: 95 BPM

## 2021-09-02 DIAGNOSIS — M79.10 MYALGIA: ICD-10-CM

## 2021-09-02 DIAGNOSIS — Z12.11 COLON CANCER SCREENING: ICD-10-CM

## 2021-09-02 DIAGNOSIS — E03.4 HYPOTHYROIDISM DUE TO ACQUIRED ATROPHY OF THYROID: ICD-10-CM

## 2021-09-02 DIAGNOSIS — L20.82 FLEXURAL ECZEMA: ICD-10-CM

## 2021-09-02 DIAGNOSIS — I10 ESSENTIAL HYPERTENSION: Primary | ICD-10-CM

## 2021-09-02 DIAGNOSIS — M13.0 POLYARTHRITIS: ICD-10-CM

## 2021-09-02 DIAGNOSIS — E78.49 OTHER HYPERLIPIDEMIA: ICD-10-CM

## 2021-09-02 PROCEDURE — 84439 ASSAY OF FREE THYROXINE: CPT | Performed by: FAMILY MEDICINE

## 2021-09-02 PROCEDURE — 99214 OFFICE O/P EST MOD 30 MIN: CPT | Performed by: FAMILY MEDICINE

## 2021-09-02 PROCEDURE — 36415 COLL VENOUS BLD VENIPUNCTURE: CPT | Performed by: FAMILY MEDICINE

## 2021-09-02 PROCEDURE — 85025 COMPLETE CBC W/AUTO DIFF WBC: CPT | Performed by: FAMILY MEDICINE

## 2021-09-02 PROCEDURE — 80061 LIPID PANEL: CPT | Performed by: FAMILY MEDICINE

## 2021-09-02 PROCEDURE — 84443 ASSAY THYROID STIM HORMONE: CPT | Performed by: FAMILY MEDICINE

## 2021-09-02 PROCEDURE — 83735 ASSAY OF MAGNESIUM: CPT | Performed by: FAMILY MEDICINE

## 2021-09-02 PROCEDURE — 82550 ASSAY OF CK (CPK): CPT | Performed by: FAMILY MEDICINE

## 2021-09-02 PROCEDURE — 80053 COMPREHEN METABOLIC PANEL: CPT | Performed by: FAMILY MEDICINE

## 2021-09-02 RX ORDER — HYDROCHLOROTHIAZIDE 25 MG/1
25 TABLET ORAL DAILY
Qty: 90 TABLET | Refills: 1 | Status: SHIPPED | OUTPATIENT
Start: 2021-09-02 | End: 2021-09-03

## 2021-09-02 RX ORDER — LEVOTHYROXINE SODIUM 0.05 MG/1
50 TABLET ORAL DAILY
Qty: 90 TABLET | Refills: 1 | Status: SHIPPED | OUTPATIENT
Start: 2021-09-02 | End: 2022-03-10 | Stop reason: SDUPTHER

## 2021-09-02 RX ORDER — SIMVASTATIN 20 MG
20 TABLET ORAL NIGHTLY
Qty: 90 TABLET | Refills: 1 | Status: CANCELLED | OUTPATIENT
Start: 2021-09-02

## 2021-09-02 RX ORDER — POTASSIUM CHLORIDE 750 MG/1
10 TABLET, EXTENDED RELEASE ORAL 2 TIMES DAILY
Qty: 180 TABLET | Refills: 1 | Status: SHIPPED | OUTPATIENT
Start: 2021-09-02 | End: 2022-03-10 | Stop reason: SDUPTHER

## 2021-09-02 RX ORDER — LOSARTAN POTASSIUM 100 MG/1
100 TABLET ORAL DAILY
Qty: 90 TABLET | Refills: 1 | Status: SHIPPED | OUTPATIENT
Start: 2021-09-02 | End: 2022-03-10 | Stop reason: SDUPTHER

## 2021-09-02 NOTE — PROGRESS NOTES
Subjective   Jocelyn Gee is a 66 y.o. female.     History of Present Illness follow-up hypertension dyslipidemia hypothyroid state.  Is almost completely stop smoking.  Medications are as reconciled.  Fairly active.  Retired now.  Denies respiratory CDV  GI orthopedic skin concerns.  Reluctant to go to hospital for mammography.  BP fluctuates some.  Some nocturnal leg dysesthesias.    The following portions of the patient's history were reviewed and updated as appropriate: allergies, past family history, past medical history, past social history, past surgical history and problem list.    Review of Systems  See history of Present Illness     Objective     Physical Exam  Vitals reviewed.   Constitutional:       Appearance: Normal appearance.   HENT:      Head: Normocephalic.   Cardiovascular:      Rate and Rhythm: Normal rate and regular rhythm.      Heart sounds: Normal heart sounds.   Pulmonary:      Effort: Pulmonary effort is normal.      Breath sounds: Normal breath sounds.   Musculoskeletal:      Cervical back: Normal range of motion and neck supple.   Skin:     General: Skin is warm and dry.   Neurological:      Mental Status: She is alert and oriented to person, place, and time.   Psychiatric:         Mood and Affect: Mood normal.         PHQ-9 Total Score:      Body mass index is 28.13 kg/m².       Assessment/Plan     Diagnoses and all orders for this visit:    1. Essential hypertension (Primary)  -     hydroCHLOROthiazide (HYDRODIURIL) 25 MG tablet; Take 1 tablet by mouth Daily.  Dispense: 90 tablet; Refill: 1  -     losartan (COZAAR) 100 MG tablet; Take 1 tablet by mouth Daily.  Dispense: 90 tablet; Refill: 1  -     potassium chloride (K-DUR,KLOR-CON) 10 MEQ CR tablet; Take 1 tablet by mouth 2 (Two) Times a Day.  Dispense: 180 tablet; Refill: 1  -     CBC & Differential  -     Comprehensive Metabolic Panel    2. Flexural eczema    3. Hypothyroidism due to acquired atrophy of thyroid  -     levothyroxine  (SYNTHROID, LEVOTHROID) 50 MCG tablet; Take 1 tablet by mouth Daily.  Dispense: 90 tablet; Refill: 1  -     T4, Free  -     TSH    4. Other hyperlipidemia  -     Comprehensive Metabolic Panel  -     Lipid Panel  -     CK    5. Polyarthritis  -     diclofenac (VOLTAREN) 50 MG EC tablet; Take 1 tablet by mouth 2 (Two) Times a Day As Needed (joint).  Dispense: 60 tablet; Refill: 2  -     CBC & Differential    6. Colon cancer screening  -     Cologuard - Stool, Per Rectum; Future    7. Myalgia  -     Magnesium  -     CK    Other orders  -     Cancel: simvastatin (ZOCOR) 20 MG tablet; Take 1 tablet by mouth Every Night.  Dispense: 90 tablet; Refill: 1    Check labs as noted.  We will hold the statin drug for couple weeks and see about symptom relief regarding the legs.  Continue toward smoking complete cessation.  Stay active.  Maintain pandemic response.  Will check Cologuard for screening.  Notify us in a couple weeks about symptom response.  Consider a different statin.  BP not controlled and will increase losartan to 100 mg daily.                     This document has been electronically signed by Deepak Callaway MD   September 2, 2021 09:15 EDT    Part of this note may be an electronic transcription/translation of spoken language to printed text using the Dragon Dictation System.

## 2021-09-03 DIAGNOSIS — I10 ESSENTIAL HYPERTENSION: ICD-10-CM

## 2021-09-03 DIAGNOSIS — E87.1 HYPONATREMIA: Primary | ICD-10-CM

## 2021-09-03 LAB
ALBUMIN SERPL-MCNC: 4.4 G/DL (ref 3.5–5.2)
ALBUMIN/GLOB SERPL: 1.5 G/DL
ALP SERPL-CCNC: 80 U/L (ref 39–117)
ALT SERPL W P-5'-P-CCNC: 20 U/L (ref 1–33)
ANION GAP SERPL CALCULATED.3IONS-SCNC: 13.3 MMOL/L (ref 5–15)
AST SERPL-CCNC: 19 U/L (ref 1–32)
BASOPHILS # BLD AUTO: 0.04 10*3/MM3 (ref 0–0.2)
BASOPHILS NFR BLD AUTO: 0.7 % (ref 0–1.5)
BILIRUB SERPL-MCNC: 0.3 MG/DL (ref 0–1.2)
BUN SERPL-MCNC: 9 MG/DL (ref 8–23)
BUN/CREAT SERPL: 15 (ref 7–25)
CALCIUM SPEC-SCNC: 9.8 MG/DL (ref 8.6–10.5)
CHLORIDE SERPL-SCNC: 91 MMOL/L (ref 98–107)
CHOLEST SERPL-MCNC: 164 MG/DL (ref 0–200)
CK SERPL-CCNC: 78 U/L (ref 20–180)
CO2 SERPL-SCNC: 24.7 MMOL/L (ref 22–29)
CREAT SERPL-MCNC: 0.6 MG/DL (ref 0.57–1)
DEPRECATED RDW RBC AUTO: 45.9 FL (ref 37–54)
EOSINOPHIL # BLD AUTO: 0.05 10*3/MM3 (ref 0–0.4)
EOSINOPHIL NFR BLD AUTO: 0.9 % (ref 0.3–6.2)
ERYTHROCYTE [DISTWIDTH] IN BLOOD BY AUTOMATED COUNT: 13.5 % (ref 12.3–15.4)
GFR SERPL CREATININE-BSD FRML MDRD: 100 ML/MIN/1.73
GLOBULIN UR ELPH-MCNC: 2.9 GM/DL
GLUCOSE SERPL-MCNC: 92 MG/DL (ref 65–99)
HCT VFR BLD AUTO: 45.9 % (ref 34–46.6)
HDLC SERPL-MCNC: 55 MG/DL (ref 40–60)
HGB BLD-MCNC: 15.6 G/DL (ref 12–15.9)
IMM GRANULOCYTES # BLD AUTO: 0.01 10*3/MM3 (ref 0–0.05)
IMM GRANULOCYTES NFR BLD AUTO: 0.2 % (ref 0–0.5)
LDLC SERPL CALC-MCNC: 85 MG/DL (ref 0–100)
LDLC/HDLC SERPL: 1.49 {RATIO}
LYMPHOCYTES # BLD AUTO: 2.05 10*3/MM3 (ref 0.7–3.1)
LYMPHOCYTES NFR BLD AUTO: 35.3 % (ref 19.6–45.3)
MAGNESIUM SERPL-MCNC: 1.9 MG/DL (ref 1.6–2.4)
MCH RBC QN AUTO: 31.5 PG (ref 26.6–33)
MCHC RBC AUTO-ENTMCNC: 34 G/DL (ref 31.5–35.7)
MCV RBC AUTO: 92.7 FL (ref 79–97)
MONOCYTES # BLD AUTO: 0.85 10*3/MM3 (ref 0.1–0.9)
MONOCYTES NFR BLD AUTO: 14.6 % (ref 5–12)
NEUTROPHILS NFR BLD AUTO: 2.81 10*3/MM3 (ref 1.7–7)
NEUTROPHILS NFR BLD AUTO: 48.3 % (ref 42.7–76)
NRBC BLD AUTO-RTO: 0 /100 WBC (ref 0–0.2)
PLATELET # BLD AUTO: 276 10*3/MM3 (ref 140–450)
PMV BLD AUTO: 10.9 FL (ref 6–12)
POTASSIUM SERPL-SCNC: 3.5 MMOL/L (ref 3.5–5.2)
PROT SERPL-MCNC: 7.3 G/DL (ref 6–8.5)
RBC # BLD AUTO: 4.95 10*6/MM3 (ref 3.77–5.28)
SODIUM SERPL-SCNC: 129 MMOL/L (ref 136–145)
T4 FREE SERPL-MCNC: 1.64 NG/DL (ref 0.93–1.7)
TRIGL SERPL-MCNC: 135 MG/DL (ref 0–150)
TSH SERPL DL<=0.05 MIU/L-ACNC: 3.5 UIU/ML (ref 0.27–4.2)
VLDLC SERPL-MCNC: 24 MG/DL (ref 5–40)
WBC # BLD AUTO: 5.81 10*3/MM3 (ref 3.4–10.8)

## 2021-09-03 RX ORDER — HYDROCHLOROTHIAZIDE 25 MG/1
25 TABLET ORAL DAILY
Qty: 90 TABLET | Refills: 1 | Status: SHIPPED | COMMUNITY
Start: 2021-09-03 | End: 2022-03-10

## 2021-09-03 NOTE — PROGRESS NOTES
Labs shows good cholesterol results.  Sodium came back slightly low surprisingly.  Could be from medication.  Cut the HCTZ in half.  BP should continue to be okay since we increase the losartan.  Please come by and in about 3 weeks to have lab only nonfasting

## 2021-09-16 ENCOUNTER — TELEPHONE (OUTPATIENT)
Dept: FAMILY MEDICINE CLINIC | Facility: CLINIC | Age: 66
End: 2021-09-16

## 2021-09-16 NOTE — TELEPHONE ENCOUNTER
Patient called to give update on her legs. She states that there is less pain in her back, knees, and arms but no improvement with her legs.   Please advise.

## 2021-09-29 ENCOUNTER — LAB (OUTPATIENT)
Dept: FAMILY MEDICINE CLINIC | Facility: CLINIC | Age: 66
End: 2021-09-29

## 2021-09-29 DIAGNOSIS — E87.1 HYPONATREMIA: ICD-10-CM

## 2021-09-29 DIAGNOSIS — I10 ESSENTIAL HYPERTENSION: ICD-10-CM

## 2021-09-29 PROCEDURE — 36415 COLL VENOUS BLD VENIPUNCTURE: CPT | Performed by: FAMILY MEDICINE

## 2021-09-29 PROCEDURE — 80048 BASIC METABOLIC PNL TOTAL CA: CPT | Performed by: FAMILY MEDICINE

## 2021-09-30 LAB
ANION GAP SERPL CALCULATED.3IONS-SCNC: 11.6 MMOL/L (ref 5–15)
BUN SERPL-MCNC: 9 MG/DL (ref 8–23)
BUN/CREAT SERPL: 13.6 (ref 7–25)
CALCIUM SPEC-SCNC: 9.6 MG/DL (ref 8.6–10.5)
CHLORIDE SERPL-SCNC: 98 MMOL/L (ref 98–107)
CO2 SERPL-SCNC: 24.4 MMOL/L (ref 22–29)
CREAT SERPL-MCNC: 0.66 MG/DL (ref 0.57–1)
GFR SERPL CREATININE-BSD FRML MDRD: 89 ML/MIN/1.73
GLUCOSE SERPL-MCNC: 95 MG/DL (ref 65–99)
POTASSIUM SERPL-SCNC: 3.9 MMOL/L (ref 3.5–5.2)
SODIUM SERPL-SCNC: 134 MMOL/L (ref 136–145)

## 2021-10-06 ENCOUNTER — TELEPHONE (OUTPATIENT)
Dept: FAMILY MEDICINE CLINIC | Facility: CLINIC | Age: 66
End: 2021-10-06

## 2021-10-06 NOTE — TELEPHONE ENCOUNTER
Called [phone] spoke with patient and told her physician was out of office and that I would forward her bp readings to Dr Callaway patient verbalized understanding.

## 2021-10-06 NOTE — TELEPHONE ENCOUNTER
Caller: Jocelyn Gee    Relationship: Self    Best call back number: [phone]    What is the best time to reach you: ANYTIME    Who are you requesting to speak with (clinical staff, provider,  specific staff member): NA    Do you know the name of the person who called: NA       What was the call regarding: PATIENT WAS SEEN 9/2 AND WAS TO CALL WITH BLOOD PRESSURE READINGS.    PATIENT STATED WHEN HER BLOOD PRESSURE GETS UP INTO THE 80'S SHE GETS A HEADACHE.     10/5  122/79    10/6 139/90           120/81           145/93    PLEASE ADVISE    Do you require a callback: YES

## 2021-10-07 RX ORDER — AMLODIPINE BESYLATE 2.5 MG/1
2.5 TABLET ORAL DAILY
Qty: 90 TABLET | Refills: 1 | Status: SHIPPED | OUTPATIENT
Start: 2021-10-07 | End: 2022-03-10

## 2021-10-07 NOTE — TELEPHONE ENCOUNTER
Additional medication added.  Amlodipine daily.  Continue losartan same and HCTZ same at half tablet daily.  Continue to monitor.  Let us know if has any symptoms or new concerns.

## 2021-10-08 NOTE — TELEPHONE ENCOUNTER
Called [phone] notified patient of new medication and to continue already prescribed meds and she verbalized understanding.

## 2021-11-30 ENCOUNTER — TELEPHONE (OUTPATIENT)
Dept: FAMILY MEDICINE CLINIC | Facility: CLINIC | Age: 66
End: 2021-11-30

## 2021-11-30 DIAGNOSIS — I10 ESSENTIAL HYPERTENSION: Primary | ICD-10-CM

## 2021-11-30 DIAGNOSIS — E87.1 HYPONATREMIA: ICD-10-CM

## 2022-03-10 ENCOUNTER — OFFICE VISIT (OUTPATIENT)
Dept: FAMILY MEDICINE CLINIC | Facility: CLINIC | Age: 67
End: 2022-03-10

## 2022-03-10 VITALS
BODY MASS INDEX: 27.74 KG/M2 | DIASTOLIC BLOOD PRESSURE: 86 MMHG | HEART RATE: 81 BPM | HEIGHT: 66 IN | TEMPERATURE: 97.5 F | SYSTOLIC BLOOD PRESSURE: 122 MMHG | OXYGEN SATURATION: 97 % | WEIGHT: 172.6 LBS

## 2022-03-10 DIAGNOSIS — E03.4 HYPOTHYROIDISM DUE TO ACQUIRED ATROPHY OF THYROID: ICD-10-CM

## 2022-03-10 DIAGNOSIS — M13.0 POLYARTHRITIS: ICD-10-CM

## 2022-03-10 DIAGNOSIS — I10 ESSENTIAL HYPERTENSION: ICD-10-CM

## 2022-03-10 DIAGNOSIS — E78.49 OTHER HYPERLIPIDEMIA: ICD-10-CM

## 2022-03-10 DIAGNOSIS — Z00.00 MEDICARE ANNUAL WELLNESS VISIT, SUBSEQUENT: Primary | ICD-10-CM

## 2022-03-10 LAB
ALBUMIN SERPL-MCNC: 4.8 G/DL (ref 3.5–5.2)
ALBUMIN/GLOB SERPL: 1.7 G/DL
ALP SERPL-CCNC: 86 U/L (ref 39–117)
ALT SERPL W P-5'-P-CCNC: 26 U/L (ref 1–33)
ANION GAP SERPL CALCULATED.3IONS-SCNC: 11.6 MMOL/L (ref 5–15)
AST SERPL-CCNC: 22 U/L (ref 1–32)
BASOPHILS # BLD AUTO: 0.05 10*3/MM3 (ref 0–0.2)
BASOPHILS NFR BLD AUTO: 0.8 % (ref 0–1.5)
BILIRUB SERPL-MCNC: 0.4 MG/DL (ref 0–1.2)
BUN SERPL-MCNC: 14 MG/DL (ref 8–23)
BUN/CREAT SERPL: 14.6 (ref 7–25)
CALCIUM SPEC-SCNC: 9.9 MG/DL (ref 8.6–10.5)
CHLORIDE SERPL-SCNC: 98 MMOL/L (ref 98–107)
CHOLEST SERPL-MCNC: 244 MG/DL (ref 0–200)
CO2 SERPL-SCNC: 24.4 MMOL/L (ref 22–29)
CREAT SERPL-MCNC: 0.96 MG/DL (ref 0.57–1)
DEPRECATED RDW RBC AUTO: 46.3 FL (ref 37–54)
EGFRCR SERPLBLD CKD-EPI 2021: 65 ML/MIN/1.73
EOSINOPHIL # BLD AUTO: 0.06 10*3/MM3 (ref 0–0.4)
EOSINOPHIL NFR BLD AUTO: 0.9 % (ref 0.3–6.2)
ERYTHROCYTE [DISTWIDTH] IN BLOOD BY AUTOMATED COUNT: 13.5 % (ref 12.3–15.4)
GLOBULIN UR ELPH-MCNC: 2.9 GM/DL
GLUCOSE SERPL-MCNC: 90 MG/DL (ref 65–99)
HCT VFR BLD AUTO: 48.6 % (ref 34–46.6)
HDLC SERPL-MCNC: 41 MG/DL (ref 40–60)
HGB BLD-MCNC: 16.2 G/DL (ref 12–15.9)
IMM GRANULOCYTES # BLD AUTO: 0.01 10*3/MM3 (ref 0–0.05)
IMM GRANULOCYTES NFR BLD AUTO: 0.2 % (ref 0–0.5)
LDLC SERPL CALC-MCNC: 173 MG/DL (ref 0–100)
LDLC/HDLC SERPL: 4.16 {RATIO}
LYMPHOCYTES # BLD AUTO: 2.7 10*3/MM3 (ref 0.7–3.1)
LYMPHOCYTES NFR BLD AUTO: 42.1 % (ref 19.6–45.3)
MCH RBC QN AUTO: 30.7 PG (ref 26.6–33)
MCHC RBC AUTO-ENTMCNC: 33.3 G/DL (ref 31.5–35.7)
MCV RBC AUTO: 92 FL (ref 79–97)
MONOCYTES # BLD AUTO: 0.8 10*3/MM3 (ref 0.1–0.9)
MONOCYTES NFR BLD AUTO: 12.5 % (ref 5–12)
NEUTROPHILS NFR BLD AUTO: 2.79 10*3/MM3 (ref 1.7–7)
NEUTROPHILS NFR BLD AUTO: 43.5 % (ref 42.7–76)
NRBC BLD AUTO-RTO: 0.2 /100 WBC (ref 0–0.2)
PLATELET # BLD AUTO: 289 10*3/MM3 (ref 140–450)
PMV BLD AUTO: 11.4 FL (ref 6–12)
POTASSIUM SERPL-SCNC: 3.9 MMOL/L (ref 3.5–5.2)
PROT SERPL-MCNC: 7.7 G/DL (ref 6–8.5)
RBC # BLD AUTO: 5.28 10*6/MM3 (ref 3.77–5.28)
SODIUM SERPL-SCNC: 134 MMOL/L (ref 136–145)
T4 FREE SERPL-MCNC: 1.71 NG/DL (ref 0.93–1.7)
TRIGL SERPL-MCNC: 163 MG/DL (ref 0–150)
TSH SERPL DL<=0.05 MIU/L-ACNC: 5.93 UIU/ML (ref 0.27–4.2)
VLDLC SERPL-MCNC: 30 MG/DL (ref 5–40)
WBC NRBC COR # BLD: 6.41 10*3/MM3 (ref 3.4–10.8)

## 2022-03-10 PROCEDURE — G0439 PPPS, SUBSEQ VISIT: HCPCS | Performed by: FAMILY MEDICINE

## 2022-03-10 PROCEDURE — 1170F FXNL STATUS ASSESSED: CPT | Performed by: FAMILY MEDICINE

## 2022-03-10 PROCEDURE — 1159F MED LIST DOCD IN RCRD: CPT | Performed by: FAMILY MEDICINE

## 2022-03-10 PROCEDURE — 85025 COMPLETE CBC W/AUTO DIFF WBC: CPT | Performed by: FAMILY MEDICINE

## 2022-03-10 PROCEDURE — 80061 LIPID PANEL: CPT | Performed by: FAMILY MEDICINE

## 2022-03-10 PROCEDURE — 84439 ASSAY OF FREE THYROXINE: CPT | Performed by: FAMILY MEDICINE

## 2022-03-10 PROCEDURE — 84443 ASSAY THYROID STIM HORMONE: CPT | Performed by: FAMILY MEDICINE

## 2022-03-10 PROCEDURE — 80053 COMPREHEN METABOLIC PANEL: CPT | Performed by: FAMILY MEDICINE

## 2022-03-10 PROCEDURE — 36415 COLL VENOUS BLD VENIPUNCTURE: CPT | Performed by: FAMILY MEDICINE

## 2022-03-10 RX ORDER — LEVOTHYROXINE SODIUM 0.05 MG/1
50 TABLET ORAL DAILY
Qty: 90 TABLET | Refills: 1 | Status: SHIPPED | OUTPATIENT
Start: 2022-03-10 | End: 2022-09-01 | Stop reason: SDUPTHER

## 2022-03-10 RX ORDER — LEVOTHYROXINE SODIUM 0.05 MG/1
50 TABLET ORAL DAILY
Qty: 90 TABLET | Refills: 1 | Status: SHIPPED | OUTPATIENT
Start: 2022-03-10 | End: 2022-03-10 | Stop reason: SDUPTHER

## 2022-03-10 RX ORDER — FUROSEMIDE 20 MG/1
20 TABLET ORAL DAILY
Qty: 90 TABLET | Refills: 1 | Status: SHIPPED | OUTPATIENT
Start: 2022-03-10 | End: 2022-09-01 | Stop reason: SDUPTHER

## 2022-03-10 RX ORDER — AMLODIPINE BESYLATE 2.5 MG/1
2.5 TABLET ORAL DAILY
Qty: 90 TABLET | Refills: 1 | Status: CANCELLED | OUTPATIENT
Start: 2022-03-10

## 2022-03-10 RX ORDER — LOSARTAN POTASSIUM 100 MG/1
100 TABLET ORAL DAILY
Qty: 90 TABLET | Refills: 1 | Status: SHIPPED | OUTPATIENT
Start: 2022-03-10 | End: 2022-09-01 | Stop reason: SDUPTHER

## 2022-03-10 RX ORDER — POTASSIUM CHLORIDE 750 MG/1
10 TABLET, EXTENDED RELEASE ORAL 2 TIMES DAILY
Qty: 180 TABLET | Refills: 1 | Status: SHIPPED | OUTPATIENT
Start: 2022-03-10 | End: 2022-09-01 | Stop reason: SDUPTHER

## 2022-03-10 RX ORDER — SIMVASTATIN 20 MG
20 TABLET ORAL NIGHTLY
Qty: 90 TABLET | Refills: 1 | Status: CANCELLED | OUTPATIENT
Start: 2022-03-10

## 2022-03-10 RX ORDER — HYDROCHLOROTHIAZIDE 25 MG/1
25 TABLET ORAL DAILY
Qty: 90 TABLET | Refills: 1 | Status: CANCELLED | OUTPATIENT
Start: 2022-03-10

## 2022-03-10 RX ORDER — POTASSIUM CHLORIDE 750 MG/1
10 TABLET, EXTENDED RELEASE ORAL 2 TIMES DAILY
Qty: 180 TABLET | Refills: 1 | Status: SHIPPED | OUTPATIENT
Start: 2022-03-10 | End: 2022-03-10 | Stop reason: SDUPTHER

## 2022-03-10 NOTE — PROGRESS NOTES
Venipuncture Blood Specimen Collection  Venipuncture performed in left arm by Merle Clancy MA with good hemostasis. Patient tolerated the procedure well without complications.   03/10/22   Merle Clancy MA

## 2022-03-10 NOTE — PROGRESS NOTES
The ABCs of the Annual Wellness Visit  Subsequent Medicare Wellness Visit    Chief Complaint   Patient presents with   • Medicare Wellness-subsequent   • essential hypertension   • Hyperlipidemia      Subjective    History of Present Illness:  Jocelyn Gee is a 66 y.o. female who presents for a Subsequent Medicare Wellness Visit.  Follow-up hypertension hypothyroid state.  Arthritis.  Globally no new problems.  Trying to stay active.  Still smokes a couple cigarettes a day.  Is as current with desired PME as desired.  Lack of secondary insurance is a factor.    The following portions of the patient's history were reviewed and   updated as appropriate: allergies, current medications, past medical history, past social history, past surgical history and problem list.    Compared to one year ago, the patient feels her physical   health is worse.    Compared to one year ago, the patient feels her mental   health is the same.    Recent Hospitalizations:  She was not admitted to the hospital during the last year.       Current Medical Providers:  Patient Care Team:  Deepak Callaway MD as PCP - General (Family Medicine)    Outpatient Medications Prior to Visit   Medication Sig Dispense Refill   • clobetasol (TEMOVATE) 0.05 % ointment Apply  topically to the appropriate area as directed 2 (Two) Times a Day As Needed (rash). 60 g 0   • amLODIPine (NORVASC) 2.5 MG tablet Take 1 tablet by mouth Daily. 90 tablet 1   • diclofenac (VOLTAREN) 50 MG EC tablet Take 1 tablet by mouth 2 (Two) Times a Day As Needed (joint). 60 tablet 2   • hydroCHLOROthiazide (HYDRODIURIL) 25 MG tablet Take 25 mg by mouth Daily. 90 tablet 1   • levothyroxine (SYNTHROID, LEVOTHROID) 50 MCG tablet Take 1 tablet by mouth Daily. 90 tablet 1   • losartan (COZAAR) 100 MG tablet Take 1 tablet by mouth Daily. 90 tablet 1   • potassium chloride (K-DUR,KLOR-CON) 10 MEQ CR tablet Take 1 tablet by mouth 2 (Two) Times a Day. 180 tablet 1   • simvastatin  "(ZOCOR) 20 MG tablet Take 1 tablet by mouth Every Night. 90 tablet 1     No facility-administered medications prior to visit.       No opioid medication identified on active medication list. I have reviewed chart for other potential  high risk medication/s and harmful drug interactions in the elderly.          Aspirin is not on active medication list.  Aspirin use is not indicated based on review of current medical condition/s. Risk of harm outweighs potential benefits.  .    Patient Active Problem List   Diagnosis   • Abdominal pain   • CN (constipation)   • Disease of gallbladder   • Billowing mitral valve   • Hypothyroidism due to acquired atrophy of thyroid   • Other hyperlipidemia   • Essential hypertension   • Other atopic dermatitis     Advance Care Planning  Advance Directive is not on file.  ACP discussion was held with the patient during this visit. Patient does not have an advance directive, declines further assistance.          Objective    Vitals:    03/10/22 0922   BP: 122/86   BP Location: Left arm   Patient Position: Sitting   Cuff Size: Adult   Pulse: 81   Temp: 97.5 °F (36.4 °C)   TempSrc: Temporal   SpO2: 97%   Weight: 78.3 kg (172 lb 9.6 oz)   Height: 167.6 cm (65.98\")     Body mass index is 27.87 kg/m².  BMI has not been calculated during today's encounter.     Does the patient have evidence of cognitive impairment? No    Physical Exam  Vitals reviewed.   Constitutional:       Appearance: Normal appearance.   HENT:      Head: Normocephalic.   Cardiovascular:      Rate and Rhythm: Normal rate and regular rhythm.      Heart sounds: Normal heart sounds.   Pulmonary:      Effort: Pulmonary effort is normal.      Breath sounds: Normal breath sounds.   Musculoskeletal:      Cervical back: Normal range of motion and neck supple.   Skin:     General: Skin is warm and dry.   Neurological:      Mental Status: She is alert and oriented to person, place, and time.   Psychiatric:         Mood and Affect: " Mood normal.                 HEALTH RISK ASSESSMENT    Smoking Status:  Social History     Tobacco Use   Smoking Status Current Every Day Smoker   • Packs/day: 0.50   • Years: 43.00   • Pack years: 21.50   • Types: Cigarettes   • Start date: 1974   Smokeless Tobacco Never Used     Alcohol Consumption:  Social History     Substance and Sexual Activity   Alcohol Use No     Fall Risk Screen:    ANA ROSA Fall Risk Assessment was completed, and patient is at MODERATE risk for falls. Assessment completed on:3/10/2022    Depression Screening:  PHQ-2/PHQ-9 Depression Screening 3/10/2022   Little Interest or Pleasure in Doing Things 0-->not at all   Feeling Down, Depressed or Hopeless 0-->not at all   PHQ-9: Brief Depression Severity Measure Score 0       Health Habits and Functional and Cognitive Screening:  Functional & Cognitive Status 3/10/2022   Do you have difficulty preparing food and eating? No   Do you have difficulty bathing yourself, getting dressed or grooming yourself? No   Do you have difficulty using the toilet? No   Do you have difficulty moving around from place to place? No   Do you have trouble with steps or getting out of a bed or a chair? No   Current Diet Well Balanced Diet   Dental Exam Not up to date   Eye Exam Up to date   Exercise (times per week) 4 times per week   Current Exercises Include House Cleaning   Do you need help using the phone?  No   Are you deaf or do you have serious difficulty hearing?  No   Do you need help with transportation? No   Do you need help shopping? No   Do you need help preparing meals?  No   Do you need help with housework?  No   Do you need help with laundry? No   Do you need help taking your medications? No   Do you need help managing money? No   Do you ever drive or ride in a car without wearing a seat belt? No   Have you felt unusual stress, anger or loneliness in the last month? No   Who do you live with? Alone   If you need help, do you have trouble finding someone  available to you? No   Have you been bothered in the last four weeks by sexual problems? No   Do you have difficulty concentrating, remembering or making decisions? No       Age-appropriate Screening Schedule:  Refer to the list below for future screening recommendations based on patient's age, sex and/or medical conditions. Orders for these recommended tests are listed in the plan section. The patient has been provided with a written plan.    Health Maintenance   Topic Date Due   • DXA SCAN  Never done   • ZOSTER VACCINE (3 of 3) 10/23/2017   • MAMMOGRAM  Never done   • INFLUENZA VACCINE  08/01/2021   • LIPID PANEL  09/02/2022   • TDAP/TD VACCINES (2 - Td or Tdap) 07/12/2027              Assessment/Plan   CMS Preventative Services Quick Reference  Risk Factors Identified During Encounter  Cardiovascular Disease  Immunizations Discussed/Encouraged (specific Immunizations; Influenza, Shingrix and COVID19  Inactivity/Sedentary  Tobacco Use/Dependance (use dotphrase .tobaccocessation for documentation)  The above risks/problems have been discussed with the patient.  Follow up actions/plans if indicated are seen below in the Assessment/Plan Section.  Pertinent information has been shared with the patient in the After Visit Summary.    Diagnoses and all orders for this visit:    1. Medicare annual wellness visit, subsequent (Primary)    2. Polyarthritis  -     diclofenac (VOLTAREN) 50 MG EC tablet; Take 1 tablet by mouth 2 (Two) Times a Day As Needed (joint).  Dispense: 60 tablet; Refill: 2  -     CBC & Differential    3. Essential hypertension  -     losartan (COZAAR) 100 MG tablet; Take 1 tablet by mouth Daily.  Dispense: 90 tablet; Refill: 1  -     Discontinue: potassium chloride (K-DUR,KLOR-CON) 10 MEQ CR tablet; Take 1 tablet by mouth 2 (Two) Times a Day.  Dispense: 180 tablet; Refill: 1  -     Comprehensive Metabolic Panel  -     CBC & Differential  -     furosemide (Lasix) 20 MG tablet; Take 1 tablet by mouth  Daily.  Dispense: 90 tablet; Refill: 1  -     potassium chloride (K-DUR,KLOR-CON) 10 MEQ CR tablet; Take 1 tablet by mouth 2 (Two) Times a Day.  Dispense: 180 tablet; Refill: 1    4. Hypothyroidism due to acquired atrophy of thyroid  -     Discontinue: levothyroxine (SYNTHROID, LEVOTHROID) 50 MCG tablet; Take 1 tablet by mouth Daily.  Dispense: 90 tablet; Refill: 1  -     Comprehensive Metabolic Panel  -     T4, Free  -     TSH  -     levothyroxine (SYNTHROID, LEVOTHROID) 50 MCG tablet; Take 1 tablet by mouth Daily.  Dispense: 90 tablet; Refill: 1    5. Other hyperlipidemia  -     Comprehensive Metabolic Panel  -     Lipid Panel    Because of the mild lower extremity edema we will discontinue the amlodipine.  Discontinue the HCTZ but start Lasix 20 mg daily.  Continue all else same as reconciled ordered.  You were intolerant to statin drug in past May need to reconsider resumption.  Will check labs as noted notify you of those results.  Stay safely active maintain pandemic response.  Counseled about mammography DEXA scan.  You did Cologuard last year.  You are reluctant at this time.  Recheck in about 6 months or as needed.    Follow Up:   Return in about 6 months (around 9/10/2022).     An After Visit Summary and PPPS were made available to the patient.

## 2022-03-11 ENCOUNTER — TELEPHONE (OUTPATIENT)
Dept: FAMILY MEDICINE CLINIC | Facility: CLINIC | Age: 67
End: 2022-03-11

## 2022-03-11 NOTE — TELEPHONE ENCOUNTER
PT CALLED AND SAID SHE RECEIVED HER POTASSIUM RX. SHE USUALLY TAKES THEM TWICE A DAY BUT SHE ONLY GOT 6 PILLS.

## 2022-03-14 RX ORDER — ROSUVASTATIN CALCIUM 5 MG/1
5 TABLET, COATED ORAL DAILY
Qty: 90 TABLET | Refills: 1 | Status: SHIPPED | OUTPATIENT
Start: 2022-03-14 | End: 2022-09-13 | Stop reason: SDUPTHER

## 2022-03-14 NOTE — TELEPHONE ENCOUNTER
Called [facility] pharmacy they only gave a partial order due to not having quantity requested the rest is being refilled today.

## 2022-03-14 NOTE — PROGRESS NOTES
Lab testing shows cholesterol profile not as good.  All else relatively stable.  We will continue medications all same.  Hopefully BP is doing well and less swelling with the changes.  If willing could consider to retry a cholesterol-lowering drug although I know you had problems before.  Let us know.  Complete smoking cessation as well as good dietary choices.  Keep follow-ups as scheduled.

## 2022-04-04 RX ORDER — AMLODIPINE BESYLATE 2.5 MG/1
TABLET ORAL
Qty: 90 TABLET | Refills: 0 | OUTPATIENT
Start: 2022-04-04

## 2022-04-05 RX ORDER — AMLODIPINE BESYLATE 2.5 MG/1
TABLET ORAL
Qty: 90 TABLET | Refills: 0 | OUTPATIENT
Start: 2022-04-05

## 2022-04-05 NOTE — TELEPHONE ENCOUNTER
Please contact patient.  I discontinued this medication at last visit.  There should be no reason it is requested.

## 2022-04-05 NOTE — TELEPHONE ENCOUNTER
Patient was confused on if she should still be taking that medication. Patient notified to stop the amlodipine. Patient stated understanding.

## 2022-08-29 ENCOUNTER — TELEPHONE (OUTPATIENT)
Dept: FAMILY MEDICINE CLINIC | Facility: CLINIC | Age: 67
End: 2022-08-29

## 2022-08-29 ENCOUNTER — APPOINTMENT (OUTPATIENT)
Dept: GENERAL RADIOLOGY | Facility: HOSPITAL | Age: 67
End: 2022-08-29

## 2022-08-29 ENCOUNTER — HOSPITAL ENCOUNTER (EMERGENCY)
Facility: HOSPITAL | Age: 67
Discharge: HOME | End: 2022-08-29
Attending: STUDENT IN AN ORGANIZED HEALTH CARE EDUCATION/TRAINING PROGRAM | Admitting: STUDENT IN AN ORGANIZED HEALTH CARE EDUCATION/TRAINING PROGRAM

## 2022-08-29 VITALS
RESPIRATION RATE: 18 BRPM | TEMPERATURE: 97.8 F | HEART RATE: 87 BPM | HEIGHT: 66 IN | OXYGEN SATURATION: 97 % | BODY MASS INDEX: 25.71 KG/M2 | DIASTOLIC BLOOD PRESSURE: 78 MMHG | WEIGHT: 160 LBS | SYSTOLIC BLOOD PRESSURE: 137 MMHG

## 2022-08-29 DIAGNOSIS — R07.9 CHEST PAIN, UNSPECIFIED TYPE: Primary | ICD-10-CM

## 2022-08-29 LAB
ALBUMIN SERPL-MCNC: 4.42 G/DL (ref 3.5–5.2)
ALBUMIN/GLOB SERPL: 1.5 G/DL
ALP SERPL-CCNC: 97 U/L (ref 39–117)
ALT SERPL W P-5'-P-CCNC: 16 U/L (ref 1–33)
ANION GAP SERPL CALCULATED.3IONS-SCNC: 12.4 MMOL/L (ref 5–15)
AST SERPL-CCNC: 14 U/L (ref 1–32)
BASOPHILS # BLD AUTO: 0.05 10*3/MM3 (ref 0–0.2)
BASOPHILS NFR BLD AUTO: 0.7 % (ref 0–1.5)
BILIRUB SERPL-MCNC: 0.3 MG/DL (ref 0–1.2)
BUN SERPL-MCNC: 10 MG/DL (ref 8–23)
BUN/CREAT SERPL: 10.2 (ref 7–25)
CALCIUM SPEC-SCNC: 9.5 MG/DL (ref 8.6–10.5)
CHLORIDE SERPL-SCNC: 100 MMOL/L (ref 98–107)
CO2 SERPL-SCNC: 21.6 MMOL/L (ref 22–29)
CREAT SERPL-MCNC: 0.98 MG/DL (ref 0.57–1)
DEPRECATED RDW RBC AUTO: 46 FL (ref 37–54)
EGFRCR SERPLBLD CKD-EPI 2021: 63 ML/MIN/1.73
EOSINOPHIL # BLD AUTO: 0.07 10*3/MM3 (ref 0–0.4)
EOSINOPHIL NFR BLD AUTO: 1 % (ref 0.3–6.2)
ERYTHROCYTE [DISTWIDTH] IN BLOOD BY AUTOMATED COUNT: 13.6 % (ref 12.3–15.4)
GLOBULIN UR ELPH-MCNC: 2.9 GM/DL
GLUCOSE SERPL-MCNC: 122 MG/DL (ref 65–99)
HCT VFR BLD AUTO: 45.8 % (ref 34–46.6)
HGB BLD-MCNC: 15.7 G/DL (ref 12–15.9)
HOLD SPECIMEN: NORMAL
HOLD SPECIMEN: NORMAL
IMM GRANULOCYTES # BLD AUTO: 0.02 10*3/MM3 (ref 0–0.05)
IMM GRANULOCYTES NFR BLD AUTO: 0.3 % (ref 0–0.5)
LYMPHOCYTES # BLD AUTO: 3.11 10*3/MM3 (ref 0.7–3.1)
LYMPHOCYTES NFR BLD AUTO: 43.3 % (ref 19.6–45.3)
MCH RBC QN AUTO: 31.6 PG (ref 26.6–33)
MCHC RBC AUTO-ENTMCNC: 34.3 G/DL (ref 31.5–35.7)
MCV RBC AUTO: 92.2 FL (ref 79–97)
MONOCYTES # BLD AUTO: 0.65 10*3/MM3 (ref 0.1–0.9)
MONOCYTES NFR BLD AUTO: 9.1 % (ref 5–12)
NEUTROPHILS NFR BLD AUTO: 3.28 10*3/MM3 (ref 1.7–7)
NEUTROPHILS NFR BLD AUTO: 45.6 % (ref 42.7–76)
NRBC BLD AUTO-RTO: 0 /100 WBC (ref 0–0.2)
PLATELET # BLD AUTO: 233 10*3/MM3 (ref 140–450)
PMV BLD AUTO: 9.5 FL (ref 6–12)
POTASSIUM SERPL-SCNC: 3.7 MMOL/L (ref 3.5–5.2)
PROT SERPL-MCNC: 7.3 G/DL (ref 6–8.5)
QT INTERVAL: 356 MS
QTC INTERVAL: 468 MS
RBC # BLD AUTO: 4.97 10*6/MM3 (ref 3.77–5.28)
SODIUM SERPL-SCNC: 134 MMOL/L (ref 136–145)
TROPONIN T SERPL-MCNC: <0.01 NG/ML (ref 0–0.03)
TROPONIN T SERPL-MCNC: <0.01 NG/ML (ref 0–0.03)
WBC NRBC COR # BLD: 7.18 10*3/MM3 (ref 3.4–10.8)
WHOLE BLOOD HOLD COAG: NORMAL
WHOLE BLOOD HOLD SPECIMEN: NORMAL

## 2022-08-29 PROCEDURE — 36415 COLL VENOUS BLD VENIPUNCTURE: CPT

## 2022-08-29 PROCEDURE — 93005 ELECTROCARDIOGRAM TRACING: CPT | Performed by: STUDENT IN AN ORGANIZED HEALTH CARE EDUCATION/TRAINING PROGRAM

## 2022-08-29 PROCEDURE — 80053 COMPREHEN METABOLIC PANEL: CPT | Performed by: EMERGENCY MEDICINE

## 2022-08-29 PROCEDURE — 71046 X-RAY EXAM CHEST 2 VIEWS: CPT

## 2022-08-29 PROCEDURE — 93005 ELECTROCARDIOGRAM TRACING: CPT | Performed by: EMERGENCY MEDICINE

## 2022-08-29 PROCEDURE — 84484 ASSAY OF TROPONIN QUANT: CPT | Performed by: EMERGENCY MEDICINE

## 2022-08-29 PROCEDURE — 85025 COMPLETE CBC W/AUTO DIFF WBC: CPT | Performed by: EMERGENCY MEDICINE

## 2022-08-29 PROCEDURE — 99284 EMERGENCY DEPT VISIT MOD MDM: CPT

## 2022-08-29 PROCEDURE — 71046 X-RAY EXAM CHEST 2 VIEWS: CPT | Performed by: RADIOLOGY

## 2022-08-29 PROCEDURE — 84484 ASSAY OF TROPONIN QUANT: CPT | Performed by: STUDENT IN AN ORGANIZED HEALTH CARE EDUCATION/TRAINING PROGRAM

## 2022-08-29 RX ORDER — OMEPRAZOLE 20 MG/1
20 CAPSULE, DELAYED RELEASE ORAL DAILY
Qty: 30 CAPSULE | Refills: 0 | Status: SHIPPED | OUTPATIENT
Start: 2022-08-29 | End: 2022-09-13

## 2022-08-29 RX ORDER — SODIUM CHLORIDE 0.9 % (FLUSH) 0.9 %
10 SYRINGE (ML) INJECTION AS NEEDED
Status: DISCONTINUED | OUTPATIENT
Start: 2022-08-29 | End: 2022-08-30 | Stop reason: HOSPADM

## 2022-08-29 RX ORDER — ASPIRIN 81 MG/1
324 TABLET, CHEWABLE ORAL ONCE
Status: COMPLETED | OUTPATIENT
Start: 2022-08-29 | End: 2022-08-29

## 2022-08-29 RX ADMIN — ASPIRIN 324 MG: 81 TABLET, CHEWABLE ORAL at 21:08

## 2022-08-29 NOTE — TELEPHONE ENCOUNTER
Patient called stating that she is having pain radiating from her chest to her neck into her head. Patient states when she bends over her head hurts worse and the pain remains in her chest, neck and head. Patient states her jaw hurts after and feels a heaviness in her chest. Patient denies any numbness and tingling in arm, difficulty breathing and cold sweat, nausea and light headedness. Patient takes her blood pressure and monitors it regularly. BP was taken while on the phone and reading was 157/92. Patient has taken blood pressure medication and lasix today. Pain has been ongoing for a week now.     Spoke with Dr Nunez who gave verbal order to send patient to ED.     Patient was notified of instructions and instructed to have someone drive her or call ambulance.

## 2022-09-01 DIAGNOSIS — I10 ESSENTIAL HYPERTENSION: ICD-10-CM

## 2022-09-01 DIAGNOSIS — E03.4 HYPOTHYROIDISM DUE TO ACQUIRED ATROPHY OF THYROID: ICD-10-CM

## 2022-09-01 DIAGNOSIS — M13.0 POLYARTHRITIS: ICD-10-CM

## 2022-09-01 RX ORDER — POTASSIUM CHLORIDE 750 MG/1
10 TABLET, EXTENDED RELEASE ORAL 2 TIMES DAILY
Qty: 180 TABLET | Refills: 1 | Status: SHIPPED | OUTPATIENT
Start: 2022-09-01 | End: 2023-02-28 | Stop reason: SDUPTHER

## 2022-09-01 RX ORDER — LOSARTAN POTASSIUM 100 MG/1
100 TABLET ORAL DAILY
Qty: 90 TABLET | Refills: 1 | Status: SHIPPED | OUTPATIENT
Start: 2022-09-01 | End: 2023-02-28 | Stop reason: SDUPTHER

## 2022-09-01 RX ORDER — FUROSEMIDE 20 MG/1
20 TABLET ORAL DAILY
Qty: 90 TABLET | Refills: 1 | Status: SHIPPED | OUTPATIENT
Start: 2022-09-01 | End: 2023-02-28 | Stop reason: SDUPTHER

## 2022-09-01 RX ORDER — LEVOTHYROXINE SODIUM 0.05 MG/1
50 TABLET ORAL DAILY
Qty: 90 TABLET | Refills: 1 | Status: SHIPPED | OUTPATIENT
Start: 2022-09-01 | End: 2023-02-28 | Stop reason: SDUPTHER

## 2022-09-01 NOTE — TELEPHONE ENCOUNTER
Caller: Jocelyn Gee    Relationship: Self    Best call back number: [phone]   Requested Prescriptions:   Requested Prescriptions     Pending Prescriptions Disp Refills   • furosemide (Lasix) 20 MG tablet 90 tablet 1     Sig: Take 1 tablet by mouth Daily.   • losartan (COZAAR) 100 MG tablet 90 tablet 1     Sig: Take 1 tablet by mouth Daily.   • diclofenac (VOLTAREN) 50 MG EC tablet 60 tablet 2     Sig: Take 1 tablet by mouth 2 (Two) Times a Day As Needed (joint).   • potassium chloride (K-DUR,KLOR-CON) 10 MEQ CR tablet 180 tablet 1     Sig: Take 1 tablet by mouth 2 (Two) Times a Day.   • levothyroxine (SYNTHROID, LEVOTHROID) 50 MCG tablet 90 tablet 1     Sig: Take 1 tablet by mouth Daily.        Pharmacy where request should be sent: [facility] [address] [place] [address] - [phone] [facility] [phone] FX     Additional details provided by patient:   Does the patient have less than a 3 day supply:  [x] Yes  [] No    [employer] Rep   09/01/22 09:44 EDT

## 2022-09-13 ENCOUNTER — OFFICE VISIT (OUTPATIENT)
Dept: FAMILY MEDICINE CLINIC | Facility: CLINIC | Age: 67
End: 2022-09-13

## 2022-09-13 VITALS
OXYGEN SATURATION: 96 % | DIASTOLIC BLOOD PRESSURE: 69 MMHG | HEIGHT: 66 IN | HEART RATE: 70 BPM | WEIGHT: 171.6 LBS | SYSTOLIC BLOOD PRESSURE: 143 MMHG | TEMPERATURE: 97.8 F | BODY MASS INDEX: 27.58 KG/M2

## 2022-09-13 DIAGNOSIS — E78.49 OTHER HYPERLIPIDEMIA: Chronic | ICD-10-CM

## 2022-09-13 DIAGNOSIS — R07.2 PRECORDIAL PAIN: Primary | ICD-10-CM

## 2022-09-13 DIAGNOSIS — Z12.31 ENCOUNTER FOR SCREENING MAMMOGRAM FOR MALIGNANT NEOPLASM OF BREAST: ICD-10-CM

## 2022-09-13 DIAGNOSIS — Z78.0 POSTMENOPAUSAL: ICD-10-CM

## 2022-09-13 DIAGNOSIS — I10 ESSENTIAL HYPERTENSION: Chronic | ICD-10-CM

## 2022-09-13 DIAGNOSIS — E03.4 HYPOTHYROIDISM DUE TO ACQUIRED ATROPHY OF THYROID: Chronic | ICD-10-CM

## 2022-09-13 DIAGNOSIS — Z12.2 SCREENING FOR LUNG CANCER: ICD-10-CM

## 2022-09-13 PROBLEM — K21.9 GASTROESOPHAGEAL REFLUX DISEASE WITHOUT ESOPHAGITIS: Chronic | Status: ACTIVE | Noted: 2022-09-13

## 2022-09-13 PROCEDURE — 85027 COMPLETE CBC AUTOMATED: CPT | Performed by: INTERNAL MEDICINE

## 2022-09-13 PROCEDURE — 93005 ELECTROCARDIOGRAM TRACING: CPT | Performed by: INTERNAL MEDICINE

## 2022-09-13 PROCEDURE — 99214 OFFICE O/P EST MOD 30 MIN: CPT | Performed by: INTERNAL MEDICINE

## 2022-09-13 PROCEDURE — 84443 ASSAY THYROID STIM HORMONE: CPT | Performed by: INTERNAL MEDICINE

## 2022-09-13 PROCEDURE — 80053 COMPREHEN METABOLIC PANEL: CPT | Performed by: INTERNAL MEDICINE

## 2022-09-13 PROCEDURE — 84439 ASSAY OF FREE THYROXINE: CPT | Performed by: INTERNAL MEDICINE

## 2022-09-13 PROCEDURE — 80061 LIPID PANEL: CPT | Performed by: INTERNAL MEDICINE

## 2022-09-13 PROCEDURE — 36415 COLL VENOUS BLD VENIPUNCTURE: CPT | Performed by: INTERNAL MEDICINE

## 2022-09-13 RX ORDER — ROSUVASTATIN CALCIUM 5 MG/1
5 TABLET, COATED ORAL DAILY
Qty: 90 TABLET | Refills: 1 | Status: SHIPPED | OUTPATIENT
Start: 2022-09-13 | End: 2022-09-14

## 2022-09-13 RX ORDER — ASPIRIN 81 MG/1
81 TABLET ORAL DAILY
Qty: 30 TABLET | Refills: 2 | Status: SHIPPED | OUTPATIENT
Start: 2022-09-13 | End: 2022-11-08

## 2022-09-13 NOTE — PROGRESS NOTES
Venipuncture Blood Specimen Collection  Venipuncture performed in LEFT ARM by Thalia Baldwin RN with good hemostasis. Patient tolerated the procedure well without complications.   09/13/22   Thalia Baldwin RN

## 2022-09-13 NOTE — PROGRESS NOTES
Patient Name: Jocelyn Gee Today's Date: 2022   Patient MRN / CSN: [id number] / [id number] Date of Encounter: 2022   Patient Age / : 67 y.o. / 1955 Encounter Provider: Meenakshi Nunez DO   Referring Physician: No ref. provider found          Jocelyn is a 67 y.o. female who is being seen today for Follow-up      Jocelyn presents today to transition care from Dr. Callaway to myself. She recently went to the ED at [facility] for an episode of chest pressure, described as a heaviness in her chest. She has continued to have intermittent episodes of this, with last episode being yesterday. She was given omeprazole upon ED discharge but has not taken it. She reports rarely having heartburn and wasn't sure she needed it so she wanted to talk to me about it first.     Chest Pain   This is a new problem. The current episode started 1 to 4 weeks ago. The problem occurs intermittently. The problem has been waxing and waning (currently resolved). The pain is present in the substernal region. The pain is moderate. The quality of the pain is described as heavy and pressure. Associated symptoms include shortness of breath. Pertinent negatives include no diaphoresis or nausea. The pain is aggravated by exertion. She has tried rest for the symptoms. The treatment provided significant relief. Risk factors include smoking/tobacco exposure.   Her past medical history is significant for hyperlipidemia. Prior workup: Patient has not had recent stress test but is agreeable to doing so.   Hypertension  This is a chronic problem. The current episode started more than 1 year ago. The problem is controlled (Patient reports blood pressure typically runs 110-130/70-85 at home). Associated symptoms include chest pain and shortness of breath. Current antihypertension treatment includes diuretics and angiotensin blockers. The current treatment provides significant improvement. There are no compliance problems.     Hyperlipidemia  This is a chronic problem. The current episode started more than 1 year ago. Associated symptoms include chest pain and shortness of breath. Current antihyperlipidemic treatment includes statins. The current treatment provides significant improvement of lipids. There are no compliance problems.        Allergies include:Patient has no known allergies.  Current Outpatient Medications   Medication Sig Dispense Refill   • clobetasol (TEMOVATE) 0.05 % ointment Apply  topically to the appropriate area as directed 2 (Two) Times a Day As Needed (rash). 60 g 0   • diclofenac (VOLTAREN) 50 MG EC tablet Take 1 tablet by mouth 2 (Two) Times a Day As Needed (joint). 60 tablet 2   • furosemide (Lasix) 20 MG tablet Take 1 tablet by mouth Daily. 90 tablet 1   • levothyroxine (SYNTHROID, LEVOTHROID) 50 MCG tablet Take 1 tablet by mouth Daily. 90 tablet 1   • losartan (COZAAR) 100 MG tablet Take 1 tablet by mouth Daily. 90 tablet 1   • potassium chloride (K-DUR,KLOR-CON) 10 MEQ CR tablet Take 1 tablet by mouth 2 (Two) Times a Day. 180 tablet 1   • rosuvastatin (CRESTOR) 5 MG tablet Take 1 tablet by mouth Daily. 90 tablet 1   • aspirin (aspirin) 81 MG EC tablet Take 1 tablet by mouth Daily. Take with food. 30 tablet 2     No current facility-administered medications for this visit.     Past Medical History:   Diagnosis Date   • Bronchitis    • Disease of thyroid gland    • Heart murmur    • Hypertension    • Mitral valve problem    • Mitral valve prolapse      Family History   Problem Relation Age of Onset   • Diabetes Mother    • Heart disease Mother    • Diabetes Father    • Heart disease Father    • Breast cancer Sister    • Cancer Sister    • Diabetes Maternal Grandfather    • Kidney cancer Maternal Grandfather      Past Surgical History:   Procedure Laterality Date   • CHOLECYSTECTOMY     • HYSTERECTOMY     • VAGINAL DELIVERY      Girl 1971; Boys- 1974, 1975,1969     Social History     Substance and Sexual  "Activity   Alcohol Use No     Social History     Tobacco Use   Smoking Status Current Every Day Smoker   • Packs/day: 0.50   • Years: 43.00   • Pack years: 21.50   • Types: Cigarettes   • Start date: 1974   Smokeless Tobacco Never Used     Social History     Substance and Sexual Activity   Drug Use No     Review of Systems   Constitutional: Negative for diaphoresis.   Respiratory: Positive for shortness of breath.    Cardiovascular: Positive for chest pain.   Gastrointestinal: Negative for nausea.        Depression Assessment Review:  PHQ-9 Total Score:    Vital Signs & Measurements Taken This Encounter  /69 (BP Location: Left arm, Patient Position: Sitting, Cuff Size: Adult)   Pulse 70   Temp 97.8 °F (36.6 °C) (Temporal)   Ht 167.6 cm (65.98\")   Wt 77.8 kg (171 lb 9.6 oz)   SpO2 96%   BMI 27.71 kg/m²    SpO2 Percentage    09/13/22 0830   SpO2: 96%          Physical Exam  Vitals reviewed.   Constitutional:       General: She is not in acute distress.  HENT:      Head: Normocephalic and atraumatic.   Eyes:      General: No scleral icterus.     Extraocular Movements: Extraocular movements intact.      Conjunctiva/sclera: Conjunctivae normal.      Pupils: Pupils are equal, round, and reactive to light.   Cardiovascular:      Rate and Rhythm: Normal rate and regular rhythm.   Pulmonary:      Effort: Pulmonary effort is normal. No respiratory distress.      Breath sounds: Normal breath sounds.   Musculoskeletal:         General: No swelling.      Cervical back: Neck supple. No tenderness.   Lymphadenopathy:      Cervical: No cervical adenopathy.   Skin:     General: Skin is warm and dry.      Coloration: Skin is not jaundiced.   Neurological:      Mental Status: She is alert.   Psychiatric:         Mood and Affect: Mood normal.         Behavior: Behavior normal.              Assessment & Plan  Patient Active Problem List   Diagnosis   • Abdominal pain   • CN (constipation)   • Disease of gallbladder   • " Billowing mitral valve   • Hypothyroidism due to acquired atrophy of thyroid   • Other hyperlipidemia   • Essential hypertension   • Other atopic dermatitis       ICD-10-CM ICD-9-CM   1. Precordial pain  R07.2 786.51   2. Essential hypertension  I10 401.9   3. Other hyperlipidemia  E78.49 272.4   4. Hypothyroidism due to acquired atrophy of thyroid  E03.4 244.8     246.8   5. Screening for lung cancer  Z12.2 V76.0   6. Encounter for screening mammogram for malignant neoplasm of breast  Z12.31 V76.12   7. Postmenopausal  Z78.0 V49.81     Orders Placed This Encounter   Procedures   •  CT Chest Low Dose Cancer Screening WO     Standing Status:   Future     Standing Expiration Date:   9/13/2023     Order Specific Question:   The patient is age 50-80 (Medicare coverage 50-77)     Answer:   67     Order Specific Question:   The patient is a current smoker?     Answer:   Yes     Order Specific Question:   The patient has a smoking history of 20 pack-years or greater:     Answer:   Yes     Order Specific Question:   Actual pack - year smoking history (number):     Answer:   21.5     Order Specific Question:   Does the patient have any clinical signs/symptoms of lung cancer?     Answer:   No     Order Specific Question:   The patient was engaged in shared decision-making for this test:     Answer:   Yes   • Mammo Screening Digital Tomosynthesis Bilateral With CAD     Standing Status:   Future     Standing Expiration Date:   9/13/2023     Order Specific Question:   Reason for Exam:     Answer:   Screening for breast CA   • DEXA Bone Density Axial     Standing Status:   Future     Standing Expiration Date:   9/13/2023     Scheduling Instructions:      Schedule with mammo please     Order Specific Question:   Reason for Exam:     Answer:   postmenopausal   • Comprehensive Metabolic Panel     Order Specific Question:   Release to patient     Answer:   Routine Release   • CBC (No Diff)     Order Specific Question:   Release to  patient     Answer:   Routine Release   • Lipid Panel   • TSH     Order Specific Question:   Release to patient     Answer:   Routine Release   • T4, Free   • Stress test with myocardial perfusion     Standing Status:   Future     Standing Expiration Date:   9/13/2023     Order Specific Question:   What stress agent will be used?     Answer:   Regadenoson (Lexiscan)     Order Specific Question:   Difficulty walking criteria?     Answer:   Musculoskeletal (hips, knees, feet, back, amputee)     Order Specific Question:   Reason for exam?     Answer:   Chest Pain     Order Specific Question:   Release to patient     Answer:   Routine Release   • ECG 12 Lead     Order Specific Question:   Reason for Exam:     Answer:   htn     Order Specific Question:   Release to patient     Answer:   Routine Release       Meds Ordered During Visit:  New Medications Ordered This Visit   Medications   • rosuvastatin (CRESTOR) 5 MG tablet     Sig: Take 1 tablet by mouth Daily.     Dispense:  90 tablet     Refill:  1   • aspirin (aspirin) 81 MG EC tablet     Sig: Take 1 tablet by mouth Daily. Take with food.     Dispense:  30 tablet     Refill:  2       I reviewed ED records from [facility] today. I encouraged tobacco cessation today. We will check stress test. I also recommended starting asa 81 mg daily with food pending stress test.     ECG in office today showed NSR, rate 70 bpm, nad, no acute ST changes. Compared to previous ECG done in ED on 8/29/22, inferior ST depression has now resolved.     Medications were reviewed. I recommend patient continue her current medicines and updated refill as requested today. I recommended patient hold on starting PPI, as I currently do not feel it is indicated. I am concerned of cardiac origin as the culprit of her chest pain. We will start asa and await stress test results for further recommendations. We will update labs today. We will update mammogram, dexa and LDCT.     Return in about  8 weeks (around 11/8/2022), or if symptoms worsen or fail to improve, for Recheck.          Referring Provider (if known): No ref. provider found      This document has been electronically signed by Meenakshi Nunez DO  September 13, 2022 09:59 EDT    Meenakshi Nunez DO, FACOI  [address] [place] [address]  [phone] (office)    Part of this note may be an electronic transcription/translation of spoken language to printed text using the Dragon Dictation System.

## 2022-09-14 LAB
ALBUMIN SERPL-MCNC: 4.2 G/DL (ref 3.5–5.2)
ALBUMIN/GLOB SERPL: 1.6 G/DL
ALP SERPL-CCNC: 72 U/L (ref 39–117)
ALT SERPL W P-5'-P-CCNC: 14 U/L (ref 1–33)
ANION GAP SERPL CALCULATED.3IONS-SCNC: 9 MMOL/L (ref 5–15)
AST SERPL-CCNC: 19 U/L (ref 1–32)
BILIRUB SERPL-MCNC: 0.3 MG/DL (ref 0–1.2)
BUN SERPL-MCNC: 10 MG/DL (ref 8–23)
BUN/CREAT SERPL: 12.7 (ref 7–25)
CALCIUM SPEC-SCNC: 9.7 MG/DL (ref 8.6–10.5)
CHLORIDE SERPL-SCNC: 100 MMOL/L (ref 98–107)
CHOLEST SERPL-MCNC: 221 MG/DL (ref 0–200)
CO2 SERPL-SCNC: 24 MMOL/L (ref 22–29)
CREAT SERPL-MCNC: 0.79 MG/DL (ref 0.57–1)
DEPRECATED RDW RBC AUTO: 46 FL (ref 37–54)
EGFRCR SERPLBLD CKD-EPI 2021: 81.6 ML/MIN/1.73
ERYTHROCYTE [DISTWIDTH] IN BLOOD BY AUTOMATED COUNT: 13.6 % (ref 12.3–15.4)
GLOBULIN UR ELPH-MCNC: 2.6 GM/DL
GLUCOSE SERPL-MCNC: 89 MG/DL (ref 65–99)
HCT VFR BLD AUTO: 46.6 % (ref 34–46.6)
HDLC SERPL-MCNC: 44 MG/DL (ref 40–60)
HGB BLD-MCNC: 15.4 G/DL (ref 12–15.9)
LDLC SERPL CALC-MCNC: 153 MG/DL (ref 0–100)
LDLC/HDLC SERPL: 3.41 {RATIO}
MCH RBC QN AUTO: 30.9 PG (ref 26.6–33)
MCHC RBC AUTO-ENTMCNC: 33 G/DL (ref 31.5–35.7)
MCV RBC AUTO: 93.6 FL (ref 79–97)
PLATELET # BLD AUTO: 233 10*3/MM3 (ref 140–450)
PMV BLD AUTO: 11.3 FL (ref 6–12)
POTASSIUM SERPL-SCNC: 4.6 MMOL/L (ref 3.5–5.2)
PROT SERPL-MCNC: 6.8 G/DL (ref 6–8.5)
RBC # BLD AUTO: 4.98 10*6/MM3 (ref 3.77–5.28)
SODIUM SERPL-SCNC: 133 MMOL/L (ref 136–145)
T4 FREE SERPL-MCNC: 1.49 NG/DL (ref 0.93–1.7)
TRIGL SERPL-MCNC: 135 MG/DL (ref 0–150)
TSH SERPL DL<=0.05 MIU/L-ACNC: 4.2 UIU/ML (ref 0.27–4.2)
VLDLC SERPL-MCNC: 24 MG/DL (ref 5–40)
WBC NRBC COR # BLD: 4.77 10*3/MM3 (ref 3.4–10.8)

## 2022-09-14 RX ORDER — ROSUVASTATIN CALCIUM 10 MG/1
10 TABLET, COATED ORAL DAILY
Qty: 90 TABLET | Refills: 1 | Status: SHIPPED | OUTPATIENT
Start: 2022-09-14 | End: 2023-01-30

## 2022-10-21 ENCOUNTER — APPOINTMENT (OUTPATIENT)
Dept: MAMMOGRAPHY | Facility: HOSPITAL | Age: 67
End: 2022-10-21

## 2022-10-26 ENCOUNTER — HOSPITAL ENCOUNTER (OUTPATIENT)
Dept: NUCLEAR MEDICINE | Facility: HOSPITAL | Age: 67
Discharge: HOME | End: 2022-10-26

## 2022-10-26 ENCOUNTER — APPOINTMENT (OUTPATIENT)
Dept: NUCLEAR MEDICINE | Facility: HOSPITAL | Age: 67
End: 2022-10-26

## 2022-10-26 ENCOUNTER — HOSPITAL ENCOUNTER (OUTPATIENT)
Dept: CT IMAGING | Facility: HOSPITAL | Age: 67
Discharge: HOME | End: 2022-10-26

## 2022-10-26 ENCOUNTER — HOSPITAL ENCOUNTER (OUTPATIENT)
Dept: CARDIOLOGY | Facility: HOSPITAL | Age: 67
Discharge: HOME | End: 2022-10-26

## 2022-10-26 ENCOUNTER — APPOINTMENT (OUTPATIENT)
Dept: CARDIOLOGY | Facility: HOSPITAL | Age: 67
End: 2022-10-26

## 2022-10-26 DIAGNOSIS — R07.2 PRECORDIAL PAIN: ICD-10-CM

## 2022-10-26 DIAGNOSIS — Z12.2 SCREENING FOR LUNG CANCER: ICD-10-CM

## 2022-10-26 DIAGNOSIS — R91.8 MULTIPLE LUNG NODULES: Primary | ICD-10-CM

## 2022-10-26 LAB
BH CV NUCLEAR PRIOR STUDY: 3
BH CV REST NUCLEAR ISOTOPE DOSE: 10.1 MCI
BH CV STRESS BP STAGE 1: NORMAL
BH CV STRESS COMMENTS STAGE 1: NORMAL
BH CV STRESS DOSE REGADENOSON STAGE 1: 0.4
BH CV STRESS DURATION MIN STAGE 1: 0
BH CV STRESS DURATION SEC STAGE 1: 10
BH CV STRESS HR STAGE 1: 99
BH CV STRESS NUCLEAR ISOTOPE DOSE: 30.5 MCI
BH CV STRESS PROTOCOL 1: NORMAL
BH CV STRESS RECOVERY BP: NORMAL MMHG
BH CV STRESS RECOVERY HR: 80 BPM
BH CV STRESS STAGE 1: 1
MAXIMAL PREDICTED HEART RATE: 152 BPM
PERCENT MAX PREDICTED HR: 65.13 %
STRESS BASELINE BP: NORMAL MMHG
STRESS BASELINE HR: 75 BPM
STRESS PERCENT HR: 77 %
STRESS POST PEAK BP: NORMAL MMHG
STRESS POST PEAK HR: 99 BPM
STRESS TARGET HR: 129 BPM

## 2022-10-26 PROCEDURE — 0 TECHNETIUM SESTAMIBI: Performed by: INTERNAL MEDICINE

## 2022-10-26 PROCEDURE — A9500 TC99M SESTAMIBI: HCPCS | Performed by: INTERNAL MEDICINE

## 2022-10-26 PROCEDURE — 93017 CV STRESS TEST TRACING ONLY: CPT

## 2022-10-26 PROCEDURE — 71271 CT THORAX LUNG CANCER SCR C-: CPT | Performed by: RADIOLOGY

## 2022-10-26 PROCEDURE — 78452 HT MUSCLE IMAGE SPECT MULT: CPT | Performed by: INTERNAL MEDICINE

## 2022-10-26 PROCEDURE — 71271 CT THORAX LUNG CANCER SCR C-: CPT

## 2022-10-26 PROCEDURE — 93018 CV STRESS TEST I&R ONLY: CPT | Performed by: INTERNAL MEDICINE

## 2022-10-26 PROCEDURE — 78452 HT MUSCLE IMAGE SPECT MULT: CPT

## 2022-10-26 PROCEDURE — 25010000002 REGADENOSON 0.4 MG/5ML SOLUTION: Performed by: INTERNAL MEDICINE

## 2022-10-26 RX ADMIN — TECHNETIUM TC 99M SESTAMIBI 1 DOSE: 1 INJECTION INTRAVENOUS at 08:47

## 2022-10-26 RX ADMIN — TECHNETIUM TC 99M SESTAMIBI 1 DOSE: 1 INJECTION INTRAVENOUS at 07:45

## 2022-10-26 RX ADMIN — REGADENOSON 0.4 MG: 0.08 INJECTION, SOLUTION INTRAVENOUS at 08:47

## 2022-10-28 ENCOUNTER — TELEPHONE (OUTPATIENT)
Dept: FAMILY MEDICINE CLINIC | Facility: CLINIC | Age: 67
End: 2022-10-28

## 2022-10-28 NOTE — TELEPHONE ENCOUNTER
Caller: Jocelyn Gee    Relationship: Self    Best call back number: [phone]  What is the best time to reach you: ANY     Who are you requesting to speak with (clinical staff, provider,  specific staff member): CLINICAL     What was the call regarding: WANTED TO KNOW WHY SHE WAS SCHEDULED FOR ANOTHER CHEST CT IN April. SHE IS CONCERNED SINCE SHE JUST HAD ONE THIS WEEK. PLEASE CALL BACK TO LET HER KNOW WHY ANOTHER WAS SCHEDULED     Do you require a callback: YES

## 2022-11-08 ENCOUNTER — OFFICE VISIT (OUTPATIENT)
Dept: FAMILY MEDICINE CLINIC | Facility: CLINIC | Age: 67
End: 2022-11-08

## 2022-11-08 VITALS
BODY MASS INDEX: 27.16 KG/M2 | OXYGEN SATURATION: 97 % | HEART RATE: 80 BPM | SYSTOLIC BLOOD PRESSURE: 122 MMHG | HEIGHT: 66 IN | TEMPERATURE: 97.1 F | DIASTOLIC BLOOD PRESSURE: 70 MMHG | WEIGHT: 169 LBS

## 2022-11-08 DIAGNOSIS — R91.8 PULMONARY NODULES: Chronic | ICD-10-CM

## 2022-11-08 DIAGNOSIS — R07.89 ATYPICAL CHEST PAIN: ICD-10-CM

## 2022-11-08 DIAGNOSIS — E78.49 OTHER HYPERLIPIDEMIA: Chronic | ICD-10-CM

## 2022-11-08 DIAGNOSIS — R10.9 FLANK PAIN: Primary | ICD-10-CM

## 2022-11-08 LAB
BILIRUB BLD-MCNC: NEGATIVE MG/DL
CLARITY, POC: CLEAR
COLOR UR: YELLOW
EXPIRATION DATE: ABNORMAL
GLUCOSE UR STRIP-MCNC: NEGATIVE MG/DL
KETONES UR QL: NEGATIVE
LEUKOCYTE EST, POC: ABNORMAL
Lab: ABNORMAL
NITRITE UR-MCNC: NEGATIVE MG/ML
PH UR: 6 [PH] (ref 5–8)
PROT UR STRIP-MCNC: ABNORMAL MG/DL
RBC # UR STRIP: NEGATIVE /UL
SP GR UR: 1.02 (ref 1–1.03)
UROBILINOGEN UR QL: ABNORMAL

## 2022-11-08 PROCEDURE — 87086 URINE CULTURE/COLONY COUNT: CPT | Performed by: INTERNAL MEDICINE

## 2022-11-08 PROCEDURE — 81003 URINALYSIS AUTO W/O SCOPE: CPT | Performed by: INTERNAL MEDICINE

## 2022-11-08 PROCEDURE — 90662 IIV NO PRSV INCREASED AG IM: CPT | Performed by: INTERNAL MEDICINE

## 2022-11-08 PROCEDURE — 99214 OFFICE O/P EST MOD 30 MIN: CPT | Performed by: INTERNAL MEDICINE

## 2022-11-08 PROCEDURE — G0008 ADMIN INFLUENZA VIRUS VAC: HCPCS | Performed by: INTERNAL MEDICINE

## 2022-11-08 NOTE — PROGRESS NOTES
Patient Name: Jocelyn Gee Today's Date: 2022   Patient MRN / CSN: [id number] / [id number] Date of Encounter: 2022   Patient Age / : 67 y.o. / 1955 Encounter Provider: Meenakshi Nunez DO   Referring Physician: No ref. provider found          Jocelyn is a 67 y.o. female who is being seen today for Follow-up      History of Present Illness      Jocelyn presents today for follow up on chest pain. She reports chest pain is better than before.  She is currently asymptomatic and has not had an episode of chest pain in several days.  She notes this to be positional, occurring when bending over.  She had a stress test done since last visit which was a low risk study.  She denies acid reflux or heartburn.    Jocelyn had LDCT chest which showed multiple pulmonary nodules.  This CT scan also showed a prominent renal collecting system on the right.  Patient has noted right flank pain for several months.  She denies dysuria or hematuria.  She denies any recent fever.  She has never had a kidney stone that she is aware of.    Jocelyn has hyperlipidemia.  At last appointment, we checked her cholesterol and her LDL had improved to 153 mg/DL.  Although this had improved, it was still not to goal.  I recommended increasing Crestor to 10 mg daily.  Patient reports tolerating this dose well.    Allergies include:Patient has no known allergies.  Current Outpatient Medications   Medication Sig Dispense Refill   • clobetasol (TEMOVATE) 0.05 % ointment Apply  topically to the appropriate area as directed 2 (Two) Times a Day As Needed (rash). 60 g 0   • diclofenac (VOLTAREN) 50 MG EC tablet Take 1 tablet by mouth 2 (Two) Times a Day As Needed (joint). 60 tablet 2   • furosemide (Lasix) 20 MG tablet Take 1 tablet by mouth Daily. 90 tablet 1   • levothyroxine (SYNTHROID, LEVOTHROID) 50 MCG tablet Take 1 tablet by mouth Daily. 90 tablet 1   • losartan (COZAAR) 100 MG tablet Take 1 tablet by mouth Daily. 90 tablet 1   • potassium  "chloride (K-DUR,KLOR-CON) 10 MEQ CR tablet Take 1 tablet by mouth 2 (Two) Times a Day. 180 tablet 1   • rosuvastatin (CRESTOR) 10 MG tablet Take 1 tablet by mouth Daily. 90 tablet 1     No current facility-administered medications for this visit.     Past Medical History:   Diagnosis Date   • Bronchitis    • Disease of thyroid gland    • Heart murmur    • Hypertension    • Mitral valve problem    • Mitral valve prolapse      Family History   Problem Relation Age of Onset   • Diabetes Mother    • Heart disease Mother    • Diabetes Father    • Heart disease Father    • Breast cancer Sister    • Cancer Sister    • Diabetes Maternal Grandfather    • Kidney cancer Maternal Grandfather      Past Surgical History:   Procedure Laterality Date   • CHOLECYSTECTOMY     • HYSTERECTOMY     • VAGINAL DELIVERY      Girl 1971; Boys- 1974, 1975,1969     Social History     Substance and Sexual Activity   Alcohol Use No     Social History     Tobacco Use   Smoking Status Every Day   • Packs/day: 0.25   • Years: 43.00   • Pack years: 10.75   • Types: Cigarettes   • Start date: 1974   Smokeless Tobacco Never     Social History     Substance and Sexual Activity   Drug Use No     Review of Systems   Constitutional: Negative for fever.   Respiratory: Negative for shortness of breath.    Cardiovascular: Negative for chest pain.        No chest pain at this time. She reports last episode was days ago and relieved with positional change.    Gastrointestinal: Negative for blood in stool.        Right flank pain   Genitourinary: Negative for dysuria and hematuria.        Depression Assessment Review:  PHQ-9 Total Score:    Vital Signs & Measurements Taken This Encounter  /70 (BP Location: Left arm, Patient Position: Sitting, Cuff Size: Adult)   Pulse 80   Temp 97.1 °F (36.2 °C) (Temporal)   Ht 167.6 cm (65.98\")   Wt 76.7 kg (169 lb)   SpO2 97%   BMI 27.29 kg/m²    SpO2 Percentage    11/08/22 0839   SpO2: 97%          Physical " Exam  Vitals reviewed.   Constitutional:       General: She is not in acute distress.  HENT:      Head: Normocephalic and atraumatic.   Eyes:      General: No scleral icterus.     Extraocular Movements: Extraocular movements intact.      Conjunctiva/sclera: Conjunctivae normal.      Pupils: Pupils are equal, round, and reactive to light.   Cardiovascular:      Rate and Rhythm: Normal rate and regular rhythm.   Pulmonary:      Effort: Pulmonary effort is normal. No respiratory distress.      Breath sounds: Normal breath sounds. No wheezing or rhonchi.   Abdominal:      Palpations: Abdomen is soft.      Tenderness: There is no abdominal tenderness. There is no guarding or rebound.   Musculoskeletal:         General: No swelling.   Skin:     General: Skin is warm and dry.      Coloration: Skin is not jaundiced.   Neurological:      Mental Status: She is alert.   Psychiatric:         Mood and Affect: Mood normal.         Behavior: Behavior normal.              Assessment & Plan  Patient Active Problem List   Diagnosis   • Abdominal pain   • CN (constipation)   • Disease of gallbladder   • Billowing mitral valve   • Hypothyroidism due to acquired atrophy of thyroid   • Other hyperlipidemia   • Essential hypertension   • Other atopic dermatitis   • Pulmonary nodules       ICD-10-CM ICD-9-CM   1. Flank pain  R10.9 789.09   2. Pulmonary nodules  R91.8 793.19   3. Atypical chest pain  R07.89 786.59   4. Other hyperlipidemia  E78.49 272.4     Orders Placed This Encounter   Procedures   • CT Abdomen Pelvis With Contrast     Standing Status:   Future     Standing Expiration Date:   11/8/2023     Order Specific Question:   Will Oral Contrast be needed for this procedure?     Answer:   Yes     Order Specific Question:   Release to patient     Answer:   Immediate   • Fluzone High-Dose 65+yrs   • POC Urinalysis Dipstick, Automated       Meds Ordered During Visit:  No orders of the defined types were placed in this encounter.    I  reviewed CT of the chest and stress test results with patient today.  I discussed these results in detail and recommended CT of the abdomen and pelvis.  Patient is agreeable to scheduling this soon.  Urine analysis in office today was positive for small leukocytes and trace protein but negative for blood and nitrites.    In regards to pulmonary nodules, I recommended repeating CT scan of the chest in 6 months.  This is planned for April 2023.    I encouraged patient to reschedule mammogram and DEXA scan.  She plans to do so soon.    I reviewed patient's recent lab findings with her today.    We will update flu shot today.  We will stop aspirin and continue other medications as previously prescribed.  I discussed updating colonoscopy.  Patient had Cologuard in September 2021 which was negative.  She prefers to hold on colonoscopy at this time.    Return in about 1 month (around 12/8/2022), or if symptoms worsen or fail to improve, for to discuss CT after results available.          Referring Provider (if known): No ref. provider found      This document has been electronically signed by Meenakshi Nunez DO  November 8, 2022 09:49 EST    Meenakshi Nunez DO, FACOI  [address] [place] [address]  [phone] (office)    Part of this note may be an electronic transcription/translation of spoken language to printed text using the Dragon Dictation System.

## 2022-11-08 NOTE — PROGRESS NOTES
Immunization  Immunization performed in LEFT DELTOID by Thalia Baldwin RN. Patient tolerated the procedure well without complications.  11/08/22   Thalia Baldwin RN

## 2022-11-10 LAB — BACTERIA SPEC AEROBE CULT: NO GROWTH

## 2022-12-06 ENCOUNTER — HOSPITAL ENCOUNTER (OUTPATIENT)
Dept: CT IMAGING | Facility: HOSPITAL | Age: 67
Discharge: HOME | End: 2022-12-06
Admitting: INTERNAL MEDICINE

## 2022-12-06 DIAGNOSIS — R10.9 FLANK PAIN: ICD-10-CM

## 2022-12-06 LAB — CREAT BLDA-MCNC: 0.8 MG/DL (ref 0.6–1.3)

## 2022-12-06 PROCEDURE — 25010000002 IOPAMIDOL 61 % SOLUTION: Performed by: INTERNAL MEDICINE

## 2022-12-06 PROCEDURE — 82565 ASSAY OF CREATININE: CPT

## 2022-12-06 PROCEDURE — 74177 CT ABD & PELVIS W/CONTRAST: CPT | Performed by: RADIOLOGY

## 2022-12-06 PROCEDURE — 74177 CT ABD & PELVIS W/CONTRAST: CPT

## 2022-12-06 RX ADMIN — IOPAMIDOL 80 ML: 612 INJECTION, SOLUTION INTRAVENOUS at 09:56

## 2022-12-08 ENCOUNTER — OFFICE VISIT (OUTPATIENT)
Dept: FAMILY MEDICINE CLINIC | Facility: CLINIC | Age: 67
End: 2022-12-08

## 2022-12-08 VITALS
DIASTOLIC BLOOD PRESSURE: 74 MMHG | OXYGEN SATURATION: 94 % | TEMPERATURE: 97.7 F | SYSTOLIC BLOOD PRESSURE: 124 MMHG | WEIGHT: 168.4 LBS | BODY MASS INDEX: 27.19 KG/M2 | HEART RATE: 78 BPM

## 2022-12-08 DIAGNOSIS — Q62.11 HYDRONEPHROSIS WITH URETEROPELVIC JUNCTION (UPJ) OBSTRUCTION: Primary | ICD-10-CM

## 2022-12-08 DIAGNOSIS — T78.40XA ALLERGIC REACTION, INITIAL ENCOUNTER: ICD-10-CM

## 2022-12-08 PROCEDURE — 96372 THER/PROPH/DIAG INJ SC/IM: CPT | Performed by: INTERNAL MEDICINE

## 2022-12-08 PROCEDURE — 99214 OFFICE O/P EST MOD 30 MIN: CPT | Performed by: INTERNAL MEDICINE

## 2022-12-08 RX ORDER — TRIAMCINOLONE ACETONIDE 40 MG/ML
60 INJECTION, SUSPENSION INTRA-ARTICULAR; INTRAMUSCULAR ONCE
Status: COMPLETED | OUTPATIENT
Start: 2022-12-08 | End: 2022-12-08

## 2022-12-08 RX ORDER — METHYLPREDNISOLONE 4 MG/1
TABLET ORAL
Qty: 21 EACH | Refills: 0 | Status: SHIPPED | OUTPATIENT
Start: 2022-12-08 | End: 2023-02-13 | Stop reason: HOSPADM

## 2022-12-08 RX ADMIN — TRIAMCINOLONE ACETONIDE 60 MG: 40 INJECTION, SUSPENSION INTRA-ARTICULAR; INTRAMUSCULAR at 09:42

## 2022-12-08 NOTE — PROGRESS NOTES
Patient Name: Jocelyn Gee Today's Date: 2022   Patient MRN / CSN: [id number] / [id number] Date of Encounter: 2022   Patient Age / : 67 y.o. / 1955 Encounter Provider: Meenakshi Nunez DO   Referring Physician: No ref. provider found          Jocelyn is a 67 y.o. female who is being seen today for Follow-up, Results, and Rash (Rash appeared 1 day after CT scan, concerned about allergic reaction to dye)      History of Present Illness     Jocelyn presents today for follow-up after having a CT scan of the abdomen and pelvis done yesterday.  The CT scan was done to evaluate hydronephrosis.  This did show hydronephrosis without ureteral lithiasis and normal caliber of the right ureter.  This was concerning for UPJ stenosis.  Creatinine has remained normal at 0.8.  Urine analysis was negative for blood.  Patient denies abdominal symptoms.  She did have a rash developed after having the CT scan with contrast.  The rash has spread diffusely over her trunk, neck and ears, as well as extremities with terrible itching.    Jocelyn presents with papers today from the [employer] requesting these be filled out for her to continue her license.  She denies any car accidents, speeding tickets, or any driving incidents recently.  She did have a syncopal episode in 2017 for which she presented to the emergency department and was told that this was due to hypoglycemia at that time.  She has not had any syncopal episodes since.    Allergies include:Contrast dye  Current Outpatient Medications   Medication Sig Dispense Refill   • clobetasol (TEMOVATE) 0.05 % ointment Apply  topically to the appropriate area as directed 2 (Two) Times a Day As Needed (rash). 60 g 0   • diclofenac (VOLTAREN) 50 MG EC tablet Take 1 tablet by mouth 2 (Two) Times a Day As Needed (joint). 60 tablet 2   • furosemide (Lasix) 20 MG tablet Take 1 tablet by mouth Daily. 90 tablet 1   • levothyroxine (SYNTHROID, LEVOTHROID) 50 MCG tablet Take 1 tablet by  mouth Daily. 90 tablet 1   • losartan (COZAAR) 100 MG tablet Take 1 tablet by mouth Daily. 90 tablet 1   • potassium chloride (K-DUR,KLOR-CON) 10 MEQ CR tablet Take 1 tablet by mouth 2 (Two) Times a Day. 180 tablet 1   • rosuvastatin (CRESTOR) 10 MG tablet Take 1 tablet by mouth Daily. 90 tablet 1   • methylPREDNISolone (MEDROL) 4 MG dose pack Take as directed on package instructions. 21 each 0     No current facility-administered medications for this visit.     Past Medical History:   Diagnosis Date   • Bronchitis    • Disease of thyroid gland    • Heart murmur    • Hypertension    • Mitral valve problem    • Mitral valve prolapse      Family History   Problem Relation Age of Onset   • Diabetes Mother    • Heart disease Mother    • Diabetes Father    • Heart disease Father    • Breast cancer Sister    • Cancer Sister    • Diabetes Maternal Grandfather    • Kidney cancer Maternal Grandfather      Past Surgical History:   Procedure Laterality Date   • CHOLECYSTECTOMY     • HYSTERECTOMY     • VAGINAL DELIVERY      Girl 1971; Boys- 1974, 1975,1969     Social History     Substance and Sexual Activity   Alcohol Use No     Social History     Tobacco Use   Smoking Status Every Day   • Packs/day: 0.25   • Years: 43.00   • Pack years: 10.75   • Types: Cigarettes   • Start date: 1974   Smokeless Tobacco Never   Tobacco Comments    Few cigarettes per day     Social History     Substance and Sexual Activity   Drug Use No     Review of Systems   Constitutional: Negative for fever.   Gastrointestinal: Negative for abdominal pain.   Genitourinary: Negative for hematuria.        Depression Assessment Review:  PHQ-9 Total Score:    Vital Signs & Measurements Taken This Encounter  /74 (BP Location: Left arm, Patient Position: Sitting, Cuff Size: Adult)   Pulse 78   Temp 97.7 °F (36.5 °C) (Temporal)   Wt 76.4 kg (168 lb 6.4 oz)   SpO2 94%   BMI 27.19 kg/m²    SpO2 Percentage    12/08/22 0845   SpO2: 94%        Physical  Exam  Vitals reviewed.   Constitutional:       General: She is not in acute distress.  HENT:      Head: Normocephalic and atraumatic.   Eyes:      General: No scleral icterus.     Extraocular Movements: Extraocular movements intact.      Conjunctiva/sclera: Conjunctivae normal.      Pupils: Pupils are equal, round, and reactive to light.   Cardiovascular:      Rate and Rhythm: Normal rate and regular rhythm.   Pulmonary:      Effort: Pulmonary effort is normal. No respiratory distress.      Breath sounds: Normal breath sounds. No wheezing or rhonchi.   Abdominal:      Palpations: Abdomen is soft.      Tenderness: There is no abdominal tenderness. There is no right CVA tenderness or left CVA tenderness.   Musculoskeletal:         General: No swelling.      Cervical back: Neck supple. No tenderness.   Lymphadenopathy:      Cervical: No cervical adenopathy.   Skin:     General: Skin is warm and dry.      Coloration: Skin is not jaundiced.      Comments: Diffuse, macular, erythematous rash on trunk, neck, ears, and upper extremities   Neurological:      Mental Status: She is alert.   Psychiatric:         Mood and Affect: Mood normal.         Behavior: Behavior normal.           Assessment & Plan  Patient Active Problem List   Diagnosis   • Abdominal pain   • CN (constipation)   • Disease of gallbladder   • Billowing mitral valve   • Hypothyroidism due to acquired atrophy of thyroid   • Other hyperlipidemia   • Essential hypertension   • Other atopic dermatitis   • Pulmonary nodules       ICD-10-CM ICD-9-CM   1. Hydronephrosis with ureteropelvic junction (UPJ) obstruction  Q62.11 591   2. Allergic reaction, initial encounter  T78.40XA 995.3     Orders Placed This Encounter   Procedures   • Ambulatory Referral to Urology     Referral Priority:   Routine     Referral Type:   Consultation     Referral Reason:   Specialty Services Required     Requested Specialty:   Urology     Number of Visits Requested:   1       Meds  Ordered During Visit:  New Medications Ordered This Visit   Medications   • triamcinolone acetonide (KENALOG-40) injection 60 mg   • methylPREDNISolone (MEDROL) 4 MG dose pack     Sig: Take as directed on package instructions.     Dispense:  21 each     Refill:  0     I reviewed CT scan images as well as report in detail with patient today.  I recommended urology follow-up and will refer her for this soon.  We will list contrast dye as an allergy on patient's chart.  I recommended she avoid contrast dye going forward.  We will treat with Kenalog today followed by Medrol Dosepak starting tomorrow.  She may also take Benadryl as needed for itching.  I filled out patient's DMV forms for her today.  I do believe she is physically and mentally capable of continuing driving at this time.      Return in about 3 months (around 3/8/2023), or if symptoms worsen or fail to improve, for Medicare Wellness.          Referring Provider (if known): No ref. provider found      This document has been electronically signed by Meenakshi Nunez DO  December 8, 2022 09:43 EST    Meenakshi Nunez DO, FACOI  [address] [place] [address]  [phone] (office)    Part of this note may be an electronic transcription/translation of spoken language to printed text using the Dragon Dictation System.

## 2022-12-08 NOTE — PROGRESS NOTES
Injection  Injection performed in RIGHT VENTROGLUTEAL by Thalia Baldwin RN. Patient tolerated the procedure well without complications.  12/08/22   Thalia Baldwin RN

## 2023-01-09 ENCOUNTER — OFFICE VISIT (OUTPATIENT)
Dept: UROLOGY | Facility: CLINIC | Age: 68
End: 2023-01-09
Payer: MEDICARE

## 2023-01-09 VITALS — BODY MASS INDEX: 27 KG/M2 | WEIGHT: 168 LBS | HEIGHT: 66 IN

## 2023-01-09 DIAGNOSIS — Q62.11 HYDRONEPHROSIS WITH URETEROPELVIC JUNCTION (UPJ) OBSTRUCTION: Primary | ICD-10-CM

## 2023-01-09 DIAGNOSIS — R35.0 FREQUENCY OF MICTURITION: ICD-10-CM

## 2023-01-09 LAB
BILIRUB BLD-MCNC: NEGATIVE MG/DL
CLARITY, POC: CLEAR
COLOR UR: YELLOW
EXPIRATION DATE: ABNORMAL
GLUCOSE UR STRIP-MCNC: NEGATIVE MG/DL
KETONES UR QL: NEGATIVE
LEUKOCYTE EST, POC: NEGATIVE
Lab: ABNORMAL
NITRITE UR-MCNC: NEGATIVE MG/ML
PH UR: 6 [PH] (ref 5–8)
PROT UR STRIP-MCNC: NEGATIVE MG/DL
RBC # UR STRIP: ABNORMAL /UL
SP GR UR: 1.01 (ref 1–1.03)
UROBILINOGEN UR QL: NORMAL

## 2023-01-09 PROCEDURE — 99203 OFFICE O/P NEW LOW 30 MIN: CPT

## 2023-01-09 PROCEDURE — 81003 URINALYSIS AUTO W/O SCOPE: CPT

## 2023-01-09 RX ORDER — GENTAMICIN SULFATE 80 MG/100ML
80 INJECTION, SOLUTION INTRAVENOUS ONCE
Status: CANCELLED | OUTPATIENT
Start: 2023-02-13 | End: 2023-01-09

## 2023-01-09 RX ORDER — TAMSULOSIN HYDROCHLORIDE 0.4 MG/1
1 CAPSULE ORAL DAILY
Qty: 30 CAPSULE | Refills: 0 | Status: SHIPPED | OUTPATIENT
Start: 2023-01-09 | End: 2023-02-13 | Stop reason: SDUPTHER

## 2023-01-09 NOTE — PROGRESS NOTES
Chief Complaint:    Chief Complaint   Patient presents with   • Hydronephrosis       Vital Signs:   Ht 167.6 cm (65.98\")   Wt 76.2 kg (168 lb)   BMI 27.13 kg/m²   Body mass index is 27.13 kg/m².      HPI:  Jocelyn Gee is a 67 y.o. female who presents today for initial evaluation     History of Present Illness  Mrs. Gee presents to the clinic for initial evaluation of right-sided UPJ obstruction/stricture.  Patient reports her primary care provider ordered a CT with scan with contrast for evaluation of right-sided back pain.  CT scan revealed right-sided extrarenal pelvis versus hydronephrosis hydronephrosis with UPJ obstruction.  Patient states she has not had any back pain since she does report some frequency and urgency.  No stones were identified on CT.  Patient did have an allergic reaction to the contrast dye experiencing itching, hives, and swelling.      Past Medical History:  Past Medical History:   Diagnosis Date   • Bronchitis    • Disease of thyroid gland    • Heart murmur    • Hypertension    • Mitral valve problem    • Mitral valve prolapse        Current Meds:  Current Outpatient Medications   Medication Sig Dispense Refill   • clobetasol (TEMOVATE) 0.05 % ointment Apply  topically to the appropriate area as directed 2 (Two) Times a Day As Needed (rash). 60 g 0   • diclofenac (VOLTAREN) 50 MG EC tablet Take 1 tablet by mouth 2 (Two) Times a Day As Needed (joint). 60 tablet 2   • furosemide (Lasix) 20 MG tablet Take 1 tablet by mouth Daily. 90 tablet 1   • levothyroxine (SYNTHROID, LEVOTHROID) 50 MCG tablet Take 1 tablet by mouth Daily. 90 tablet 1   • losartan (COZAAR) 100 MG tablet Take 1 tablet by mouth Daily. 90 tablet 1   • methylPREDNISolone (MEDROL) 4 MG dose pack Take as directed on package instructions. 21 each 0   • potassium chloride (K-DUR,KLOR-CON) 10 MEQ CR tablet Take 1 tablet by mouth 2 (Two) Times a Day. 180 tablet 1   • rosuvastatin (CRESTOR) 10 MG tablet Take 1 tablet by mouth  Daily. 90 tablet 1   • tamsulosin (FLOMAX) 0.4 MG capsule 24 hr capsule Take 1 capsule by mouth Daily. 30 capsule 0     No current facility-administered medications for this visit.        Allergies:   Allergies   Allergen Reactions   • Contrast Dye Itching and Rash        Past Surgical History:  Past Surgical History:   Procedure Laterality Date   • CHOLECYSTECTOMY     • HYSTERECTOMY     • VAGINAL DELIVERY      Girl 1971; Boys- 1974, 1975,1969       Social History:  Social History     Socioeconomic History   • Marital status:    Tobacco Use   • Smoking status: Every Day     Packs/day: 0.25     Years: 43.00     Pack years: 10.75     Types: Cigarettes     Start date: 1974   • Smokeless tobacco: Never   • Tobacco comments:     Few cigarettes per day   Vaping Use   • Vaping Use: Never used   Substance and Sexual Activity   • Alcohol use: No   • Drug use: No   • Sexual activity: Defer     Comment:  works at [employer] in [employer]       Family History:  Family History   Problem Relation Age of Onset   • Diabetes Mother    • Heart disease Mother    • Diabetes Father    • Heart disease Father    • Breast cancer Sister    • Cancer Sister    • Diabetes Maternal Grandfather    • Kidney cancer Maternal Grandfather        Review of Systems:  Review of Systems   Genitourinary: Positive for flank pain, frequency and urgency.       Physical Exam:  Physical Exam  Constitutional:       General: She is not in acute distress.     Appearance: Normal appearance.   HENT:      Head: Normocephalic and atraumatic.      Nose: Nose normal.      Mouth/Throat:      Mouth: Mucous membranes are moist.   Eyes:      Conjunctiva/sclera: Conjunctivae normal.   Cardiovascular:      Rate and Rhythm: Normal rate and regular rhythm.      Pulses: Normal pulses.      Heart sounds: Normal heart sounds.   Pulmonary:      Effort: Pulmonary effort is normal.      Breath sounds: Normal breath sounds.   Abdominal:      General: Bowel sounds are normal.       Palpations: Abdomen is soft.      Tenderness: There is no right CVA tenderness or left CVA tenderness.   Musculoskeletal:         General: Normal range of motion.      Cervical back: Normal range of motion.   Skin:     General: Skin is warm.   Neurological:      General: No focal deficit present.      Mental Status: She is alert and oriented to person, place, and time.   Psychiatric:         Mood and Affect: Mood normal.         Behavior: Behavior normal.         Thought Content: Thought content normal.         Judgment: Judgment normal.         Recent Image (CT and/or KUB):   CT Abdomen and Pelvis: No results found for this or any previous visit.     CT Stone Protocol: No results found for this or any previous visit.     KUB: No results found for this or any previous visit.       Labs:  Brief Urine Lab Results  (Last result in the past 365 days)      Color   Clarity   Blood   Leuk Est   Nitrite   Protein   CREAT   Urine HCG        01/09/23 1043 Yellow   Clear   1+   Negative   Negative   Negative               Office Visit on 01/09/2023   Component Date Value Ref Range Status   • Color 01/09/2023 Yellow  Yellow, Straw, Dark Yellow, Vianey Final   • Clarity, UA 01/09/2023 Clear  Clear Final   • Specific Gravity  01/09/2023 1.015  1.005 - 1.030 Final   • pH, Urine 01/09/2023 6.0  5.0 - 8.0 Final   • Leukocytes 01/09/2023 Negative  Negative Final   • Nitrite, UA 01/09/2023 Negative  Negative Final   • Protein, POC 01/09/2023 Negative  Negative mg/dL Final   • Glucose, UA 01/09/2023 Negative  Negative mg/dL Final   • Ketones, UA 01/09/2023 Negative  Negative Final   • Urobilinogen, UA 01/09/2023 Normal  Normal, 0.2 E.U./dL Final   • Bilirubin 01/09/2023 Negative  Negative Final   • Blood, UA 01/09/2023 1+ (A)  Negative Final   • Lot Number 01/09/2023 [id number]   Final   • Expiration Date 01/09/2023 2/8/24   Final   Hospital Outpatient Visit on 12/06/2022   Component Date Value Ref Range Status   • Creatinine  12/06/2022 0.80  0.60 - 1.30 mg/dL Final    Serial Number: [id number]:  [id number]   Office Visit on 11/08/2022   Component Date Value Ref Range Status   • Color 11/08/2022 Yellow  Yellow, Straw, Dark Yellow, Vianey Final   • Clarity, UA 11/08/2022 Clear  Clear Final   • Specific Gravity  11/08/2022 1.020  1.005 - 1.030 Final   • pH, Urine 11/08/2022 6.0  5.0 - 8.0 Final   • Leukocytes 11/08/2022 Small (1+) (A)  Negative Final   • Nitrite, UA 11/08/2022 Negative  Negative Final   • Protein, POC 11/08/2022 Trace (A)  Negative mg/dL Final   • Glucose, UA 11/08/2022 Negative  Negative mg/dL Final   • Ketones, UA 11/08/2022 Negative  Negative Final   • Urobilinogen, UA 11/08/2022 0.2 E.U./dL  Normal, 0.2 E.U./dL Final   • Bilirubin 11/08/2022 Negative  Negative Final   • Blood, UA 11/08/2022 Negative  Negative Final   • Lot Number 11/08/2022 [id number]   Final   • Expiration Date 11/08/2022 11/3/23   Final   • Urine Culture 11/08/2022 No growth   Final   Hospital Outpatient Visit on 10/26/2022   Component Date Value Ref Range Status   • Target HR (85%) 10/26/2022 129  bpm Final   • Max. Pred. HR (100%) 10/26/2022 152  bpm Final   • BH CV REST NUCLEAR ISOTOPE DOSE 10/26/2022 10.1  mCi Final   • BH CV STRESS NUCLEAR ISOTOPE DOSE 10/26/2022 30.5  mCi Final   • Nuclear Prior Study 10/26/2022 3.0   Final   • BH CV STRESS PROTOCOL 1 10/26/2022 Pharmacologic   Final   • Stage 1 10/26/2022 1   Final   • HR Stage 1 10/26/2022 99   Final   • BP Stage 1 10/26/2022 140/81   Final   • Duration Min Stage 1 10/26/2022 0   Final   • Duration Sec Stage 1 10/26/2022 10   Final   • Stress Dose Regadenoson Stage 1 10/26/2022 0.4   Final   • Stress Comments Stage 1 10/26/2022 10 sec bolus injection   Final   • Baseline HR 10/26/2022 75  bpm Final   • Baseline BP 10/26/2022 147/78  mmHg Final   • Peak HR 10/26/2022 99  bpm Final   • Percent Max Pred HR 10/26/2022 65.13  % Final   • Percent Target HR 10/26/2022 77  % Final   • Peak BP  10/26/2022 140/81  mmHg Final   • Recovery HR 10/26/2022 80  bpm Final   • Recovery BP 10/26/2022 144/81  mmHg Final        Procedure: None  Procedures     I have reviewed and agree with the above PMH, PSH, FMH, social history, medications, allergies, and labs.     Assessment/Plan:   Problem List Items Addressed This Visit    None  Visit Diagnoses     Hydronephrosis with ureteropelvic junction (UPJ) obstruction    -  Primary    Relevant Medications    tamsulosin (FLOMAX) 0.4 MG capsule 24 hr capsule    Other Relevant Orders    Case Request (Completed)    CBC (No Diff)    Basic Metabolic Panel    Frequency of micturition        Relevant Orders    POC Urinalysis Dipstick, Automated (Completed)          Health Maintenance:   Health Maintenance Due   Topic Date Due   • MAMMOGRAM  Never done   • DXA SCAN  Never done   • ZOSTER VACCINE (3 of 3) 10/23/2017   • Pneumococcal Vaccine 65+ (2 - PCV) 11/28/2018   • COLORECTAL CANCER SCREENING  09/15/2021   • COVID-19 Vaccine (4 - Booster for Moderna series) 05/10/2022        Smoking Counseling: Patient is a current everyday smoker.  Never used smokeless tobacco.  Counseling given however not ready to quit at this time.    Urine Incontinence: Patient reports that she is not currently experiencing any symptoms of urinary incontinence.    Patient was given instructions and counseling regarding her condition or for health maintenance advice. Please see specific information pulled into the AVS if appropriate.    Patient Education:   Hydronephrosis and UPJ obstruction -discussed with the patient his CT scan revealed possible hydronephrosis versus extrarenal pelvis.  I discussed the work-up for this includes cystoscopy with bilateral retrograde pyelogram.  Patient is allergic to contrast dye and I will talk with Dr. Stovall to see if this is will be a safe procedure for the patient undergo.  I discussed the use of prednisone and Benadryl prior to study to help with contrast allergy as  indicated.  Also discussed the use of uteroscopy for further evaluation if contrast dye is contraindicated per Dr. Stovall.  Discussed the risks and benefits of both of these procedures.  I will schedule her for surgery on 1/30/2023 with Dr. Stovall.  Patient verbalizes understanding and agrees to plan of care.    Visit Diagnoses:    ICD-10-CM ICD-9-CM   1. Hydronephrosis with ureteropelvic junction (UPJ) obstruction  Q62.11 591   2. Frequency of micturition  R35.0 788.41       Meds Ordered During Visit:  New Medications Ordered This Visit   Medications   • tamsulosin (FLOMAX) 0.4 MG capsule 24 hr capsule     Sig: Take 1 capsule by mouth Daily.     Dispense:  30 capsule     Refill:  0       Follow Up Appointment: Surgery on 1/30/2023  No follow-ups on file.      This document has been electronically signed by Augustine Meeks PA-C   January 9, 2023 12:47 EST    Part of this note may be an electronic transcription/translation of spoken language to printed text using the Dragon Dictation System.

## 2023-01-29 DIAGNOSIS — M13.0 POLYARTHRITIS: ICD-10-CM

## 2023-01-30 RX ORDER — ROSUVASTATIN CALCIUM 10 MG/1
TABLET, COATED ORAL
Qty: 90 TABLET | Refills: 1 | Status: SHIPPED | OUTPATIENT
Start: 2023-01-30

## 2023-02-08 NOTE — DISCHARGE INSTRUCTIONS

## 2023-02-10 ENCOUNTER — PRE-ADMISSION TESTING (OUTPATIENT)
Dept: PREADMISSION TESTING | Facility: HOSPITAL | Age: 68
End: 2023-02-10
Payer: MEDICARE

## 2023-02-10 VITALS — BODY MASS INDEX: 25.88 KG/M2 | HEIGHT: 66 IN | WEIGHT: 161 LBS

## 2023-02-10 DIAGNOSIS — Q62.11 HYDRONEPHROSIS WITH URETEROPELVIC JUNCTION (UPJ) OBSTRUCTION: ICD-10-CM

## 2023-02-10 LAB
ANION GAP SERPL CALCULATED.3IONS-SCNC: 7.4 MMOL/L (ref 5–15)
BUN SERPL-MCNC: 11 MG/DL (ref 8–23)
BUN/CREAT SERPL: 15.1 (ref 7–25)
CALCIUM SPEC-SCNC: 9.3 MG/DL (ref 8.6–10.5)
CHLORIDE SERPL-SCNC: 100 MMOL/L (ref 98–107)
CO2 SERPL-SCNC: 27.6 MMOL/L (ref 22–29)
CREAT SERPL-MCNC: 0.73 MG/DL (ref 0.57–1)
DEPRECATED RDW RBC AUTO: 49.1 FL (ref 37–54)
EGFRCR SERPLBLD CKD-EPI 2021: 89.7 ML/MIN/1.73
ERYTHROCYTE [DISTWIDTH] IN BLOOD BY AUTOMATED COUNT: 14 % (ref 12.3–15.4)
GLUCOSE SERPL-MCNC: 88 MG/DL (ref 65–99)
HCT VFR BLD AUTO: 47.6 % (ref 34–46.6)
HGB BLD-MCNC: 15.9 G/DL (ref 12–15.9)
MCH RBC QN AUTO: 31.9 PG (ref 26.6–33)
MCHC RBC AUTO-ENTMCNC: 33.4 G/DL (ref 31.5–35.7)
MCV RBC AUTO: 95.6 FL (ref 79–97)
PLATELET # BLD AUTO: 264 10*3/MM3 (ref 140–450)
PMV BLD AUTO: 10 FL (ref 6–12)
POTASSIUM SERPL-SCNC: 4.2 MMOL/L (ref 3.5–5.2)
RBC # BLD AUTO: 4.98 10*6/MM3 (ref 3.77–5.28)
SODIUM SERPL-SCNC: 135 MMOL/L (ref 136–145)
WBC NRBC COR # BLD: 6.49 10*3/MM3 (ref 3.4–10.8)

## 2023-02-10 PROCEDURE — 80048 BASIC METABOLIC PNL TOTAL CA: CPT

## 2023-02-10 PROCEDURE — 36415 COLL VENOUS BLD VENIPUNCTURE: CPT

## 2023-02-10 PROCEDURE — 85027 COMPLETE CBC AUTOMATED: CPT

## 2023-02-13 ENCOUNTER — APPOINTMENT (OUTPATIENT)
Dept: GENERAL RADIOLOGY | Facility: HOSPITAL | Age: 68
End: 2023-02-13
Payer: MEDICARE

## 2023-02-13 ENCOUNTER — HOSPITAL ENCOUNTER (OUTPATIENT)
Facility: HOSPITAL | Age: 68
Setting detail: HOSPITAL OUTPATIENT SURGERY
Discharge: HOME | End: 2023-02-13
Attending: UROLOGY | Admitting: UROLOGY
Payer: MEDICARE

## 2023-02-13 ENCOUNTER — TELEPHONE (OUTPATIENT)
Dept: UROLOGY | Facility: CLINIC | Age: 68
End: 2023-02-13
Payer: MEDICARE

## 2023-02-13 ENCOUNTER — ANESTHESIA EVENT (OUTPATIENT)
Dept: PERIOP | Facility: HOSPITAL | Age: 68
End: 2023-02-13
Payer: MEDICARE

## 2023-02-13 ENCOUNTER — ANESTHESIA (OUTPATIENT)
Dept: PERIOP | Facility: HOSPITAL | Age: 68
End: 2023-02-13
Payer: MEDICARE

## 2023-02-13 VITALS
HEART RATE: 66 BPM | SYSTOLIC BLOOD PRESSURE: 128 MMHG | WEIGHT: 167 LBS | OXYGEN SATURATION: 95 % | BODY MASS INDEX: 26.84 KG/M2 | DIASTOLIC BLOOD PRESSURE: 74 MMHG | HEIGHT: 66 IN | TEMPERATURE: 97.7 F | RESPIRATION RATE: 16 BRPM

## 2023-02-13 DIAGNOSIS — Q62.11 HYDRONEPHROSIS WITH URETEROPELVIC JUNCTION (UPJ) OBSTRUCTION: ICD-10-CM

## 2023-02-13 PROCEDURE — 25010000002 GENTAMICIN PER 80 MG

## 2023-02-13 PROCEDURE — 25010000002 FENTANYL CITRATE (PF) 50 MCG/ML SOLUTION: Performed by: NURSE ANESTHETIST, CERTIFIED REGISTERED

## 2023-02-13 PROCEDURE — S0260 H&P FOR SURGERY: HCPCS | Performed by: UROLOGY

## 2023-02-13 PROCEDURE — 25010000002 PROPOFOL 10 MG/ML EMULSION: Performed by: NURSE ANESTHETIST, CERTIFIED REGISTERED

## 2023-02-13 PROCEDURE — 52351 CYSTOURETERO & OR PYELOSCOPE: CPT | Performed by: UROLOGY

## 2023-02-13 PROCEDURE — 25010000002 ONDANSETRON PER 1 MG: Performed by: NURSE ANESTHETIST, CERTIFIED REGISTERED

## 2023-02-13 PROCEDURE — 76000 FLUOROSCOPY <1 HR PHYS/QHP: CPT

## 2023-02-13 PROCEDURE — C1769 GUIDE WIRE: HCPCS | Performed by: UROLOGY

## 2023-02-13 PROCEDURE — C1758 CATHETER, URETERAL: HCPCS | Performed by: UROLOGY

## 2023-02-13 PROCEDURE — 25010000002 IOPAMIDOL 61 % SOLUTION: Performed by: UROLOGY

## 2023-02-13 PROCEDURE — 0 MEPERIDINE PER 100 MG: Performed by: NURSE ANESTHETIST, CERTIFIED REGISTERED

## 2023-02-13 PROCEDURE — 25010000002 MIDAZOLAM PER 1 MG: Performed by: NURSE ANESTHETIST, CERTIFIED REGISTERED

## 2023-02-13 RX ORDER — HYDROCODONE BITARTRATE AND ACETAMINOPHEN 10; 325 MG/1; MG/1
1 TABLET ORAL EVERY 6 HOURS PRN
Qty: 10 TABLET | Refills: 0 | Status: SHIPPED | OUTPATIENT
Start: 2023-02-13 | End: 2023-03-21

## 2023-02-13 RX ORDER — GENTAMICIN SULFATE 80 MG/100ML
80 INJECTION, SOLUTION INTRAVENOUS ONCE
Status: COMPLETED | OUTPATIENT
Start: 2023-02-13 | End: 2023-02-13

## 2023-02-13 RX ORDER — SODIUM CHLORIDE, SODIUM LACTATE, POTASSIUM CHLORIDE, CALCIUM CHLORIDE 600; 310; 30; 20 MG/100ML; MG/100ML; MG/100ML; MG/100ML
INJECTION, SOLUTION INTRAVENOUS CONTINUOUS PRN
Status: DISCONTINUED | OUTPATIENT
Start: 2023-02-13 | End: 2023-02-13 | Stop reason: SURG

## 2023-02-13 RX ORDER — LIDOCAINE HYDROCHLORIDE 20 MG/ML
JELLY TOPICAL AS NEEDED
Status: DISCONTINUED | OUTPATIENT
Start: 2023-02-13 | End: 2023-02-13 | Stop reason: HOSPADM

## 2023-02-13 RX ORDER — MAGNESIUM HYDROXIDE 1200 MG/15ML
LIQUID ORAL AS NEEDED
Status: DISCONTINUED | OUTPATIENT
Start: 2023-02-13 | End: 2023-02-13 | Stop reason: HOSPADM

## 2023-02-13 RX ORDER — SODIUM CHLORIDE 0.9 % (FLUSH) 0.9 %
10 SYRINGE (ML) INJECTION AS NEEDED
Status: DISCONTINUED | OUTPATIENT
Start: 2023-02-13 | End: 2023-02-13 | Stop reason: HOSPADM

## 2023-02-13 RX ORDER — OXYCODONE HYDROCHLORIDE AND ACETAMINOPHEN 5; 325 MG/1; MG/1
1 TABLET ORAL ONCE AS NEEDED
Status: DISCONTINUED | OUTPATIENT
Start: 2023-02-13 | End: 2023-02-13 | Stop reason: HOSPADM

## 2023-02-13 RX ORDER — MEPERIDINE HYDROCHLORIDE 25 MG/ML
12.5 INJECTION INTRAMUSCULAR; INTRAVENOUS; SUBCUTANEOUS
Status: COMPLETED | OUTPATIENT
Start: 2023-02-13 | End: 2023-02-13

## 2023-02-13 RX ORDER — SODIUM CHLORIDE 0.9 % (FLUSH) 0.9 %
10 SYRINGE (ML) INJECTION EVERY 12 HOURS SCHEDULED
Status: DISCONTINUED | OUTPATIENT
Start: 2023-02-13 | End: 2023-02-13 | Stop reason: HOSPADM

## 2023-02-13 RX ORDER — MIDAZOLAM HYDROCHLORIDE 1 MG/ML
0.5 INJECTION INTRAMUSCULAR; INTRAVENOUS
Status: DISCONTINUED | OUTPATIENT
Start: 2023-02-13 | End: 2023-02-13 | Stop reason: HOSPADM

## 2023-02-13 RX ORDER — IPRATROPIUM BROMIDE AND ALBUTEROL SULFATE 2.5; .5 MG/3ML; MG/3ML
3 SOLUTION RESPIRATORY (INHALATION) ONCE AS NEEDED
Status: DISCONTINUED | OUTPATIENT
Start: 2023-02-13 | End: 2023-02-13 | Stop reason: HOSPADM

## 2023-02-13 RX ORDER — FAMOTIDINE 10 MG/ML
INJECTION, SOLUTION INTRAVENOUS AS NEEDED
Status: DISCONTINUED | OUTPATIENT
Start: 2023-02-13 | End: 2023-02-13 | Stop reason: SURG

## 2023-02-13 RX ORDER — MIDAZOLAM HYDROCHLORIDE 1 MG/ML
INJECTION INTRAMUSCULAR; INTRAVENOUS AS NEEDED
Status: DISCONTINUED | OUTPATIENT
Start: 2023-02-13 | End: 2023-02-13 | Stop reason: SURG

## 2023-02-13 RX ORDER — PROPOFOL 10 MG/ML
VIAL (ML) INTRAVENOUS AS NEEDED
Status: DISCONTINUED | OUTPATIENT
Start: 2023-02-13 | End: 2023-02-13 | Stop reason: SURG

## 2023-02-13 RX ORDER — ONDANSETRON 2 MG/ML
INJECTION INTRAMUSCULAR; INTRAVENOUS AS NEEDED
Status: DISCONTINUED | OUTPATIENT
Start: 2023-02-13 | End: 2023-02-13 | Stop reason: SURG

## 2023-02-13 RX ORDER — SODIUM CHLORIDE, SODIUM LACTATE, POTASSIUM CHLORIDE, CALCIUM CHLORIDE 600; 310; 30; 20 MG/100ML; MG/100ML; MG/100ML; MG/100ML
100 INJECTION, SOLUTION INTRAVENOUS ONCE AS NEEDED
Status: DISCONTINUED | OUTPATIENT
Start: 2023-02-13 | End: 2023-02-13 | Stop reason: HOSPADM

## 2023-02-13 RX ORDER — ONDANSETRON 2 MG/ML
4 INJECTION INTRAMUSCULAR; INTRAVENOUS AS NEEDED
Status: DISCONTINUED | OUTPATIENT
Start: 2023-02-13 | End: 2023-02-13 | Stop reason: HOSPADM

## 2023-02-13 RX ORDER — FENTANYL CITRATE 50 UG/ML
INJECTION, SOLUTION INTRAMUSCULAR; INTRAVENOUS AS NEEDED
Status: DISCONTINUED | OUTPATIENT
Start: 2023-02-13 | End: 2023-02-13 | Stop reason: SURG

## 2023-02-13 RX ORDER — TAMSULOSIN HYDROCHLORIDE 0.4 MG/1
1 CAPSULE ORAL DAILY
Qty: 30 CAPSULE | Refills: 0 | Status: SHIPPED | OUTPATIENT
Start: 2023-02-13 | End: 2023-03-10

## 2023-02-13 RX ORDER — SODIUM CHLORIDE 9 MG/ML
40 INJECTION, SOLUTION INTRAVENOUS AS NEEDED
Status: DISCONTINUED | OUTPATIENT
Start: 2023-02-13 | End: 2023-02-13 | Stop reason: HOSPADM

## 2023-02-13 RX ORDER — FENTANYL CITRATE 50 UG/ML
50 INJECTION, SOLUTION INTRAMUSCULAR; INTRAVENOUS
Status: DISCONTINUED | OUTPATIENT
Start: 2023-02-13 | End: 2023-02-13 | Stop reason: HOSPADM

## 2023-02-13 RX ORDER — SODIUM CHLORIDE, SODIUM LACTATE, POTASSIUM CHLORIDE, CALCIUM CHLORIDE 600; 310; 30; 20 MG/100ML; MG/100ML; MG/100ML; MG/100ML
125 INJECTION, SOLUTION INTRAVENOUS ONCE
Status: COMPLETED | OUTPATIENT
Start: 2023-02-13 | End: 2023-02-13

## 2023-02-13 RX ADMIN — FENTANYL CITRATE 100 MCG: 50 INJECTION INTRAMUSCULAR; INTRAVENOUS at 08:42

## 2023-02-13 RX ADMIN — PROPOFOL 50 MG: 10 INJECTION, EMULSION INTRAVENOUS at 08:46

## 2023-02-13 RX ADMIN — PROPOFOL 50 MG: 10 INJECTION, EMULSION INTRAVENOUS at 08:57

## 2023-02-13 RX ADMIN — MEPERIDINE HYDROCHLORIDE 12.5 MG: 25 INJECTION INTRAMUSCULAR; INTRAVENOUS; SUBCUTANEOUS at 09:40

## 2023-02-13 RX ADMIN — FENTANYL CITRATE 50 MCG: 50 INJECTION, SOLUTION INTRAMUSCULAR; INTRAVENOUS at 09:45

## 2023-02-13 RX ADMIN — FAMOTIDINE 20 MG: 10 INJECTION, SOLUTION INTRAVENOUS at 08:42

## 2023-02-13 RX ADMIN — SODIUM CHLORIDE, POTASSIUM CHLORIDE, SODIUM LACTATE AND CALCIUM CHLORIDE 125 ML/HR: 600; 310; 30; 20 INJECTION, SOLUTION INTRAVENOUS at 08:24

## 2023-02-13 RX ADMIN — ONDANSETRON 4 MG: 2 INJECTION INTRAMUSCULAR; INTRAVENOUS at 08:42

## 2023-02-13 RX ADMIN — SODIUM CHLORIDE, POTASSIUM CHLORIDE, SODIUM LACTATE AND CALCIUM CHLORIDE: 600; 310; 30; 20 INJECTION, SOLUTION INTRAVENOUS at 08:42

## 2023-02-13 RX ADMIN — MIDAZOLAM 2 MG: 1 INJECTION INTRAMUSCULAR; INTRAVENOUS at 08:42

## 2023-02-13 RX ADMIN — GENTAMICIN SULFATE 80 MG: 80 INJECTION, SOLUTION INTRAVENOUS at 08:46

## 2023-02-13 RX ADMIN — MEPERIDINE HYDROCHLORIDE 12.5 MG: 25 INJECTION INTRAMUSCULAR; INTRAVENOUS; SUBCUTANEOUS at 09:46

## 2023-02-13 NOTE — TELEPHONE ENCOUNTER
Caller: CARLOS HAYES/ Henrietta Garcia     Relationship: SELF, DAUGHTER    Best call back number: [phone]    Requested Prescriptions:   Requested Prescriptions      No prescriptions requested or ordered in this encounter      tamsulosin (FLOMAX) 0.4 MG capsule 24 hr capsule      Pharmacy where request should be sent:      [facility] [address] [place] [address] [phone]  - [phone]      Additional details provided by patient: PT JUST HAD SURGERY AND IS OUT OF FLOMAX. STATED THIS HELPED WITH THE CONDITION AND WOULD LIKE REFILL IF POSSIBLE. PLEASE CALL PT TO DISCUSS.    Does the patient have less than a 3 day supply:  [x] Yes  [] No    Would you like a call back once the refill request has been completed: [x] Yes [] No    If the office needs to give you a call back, can they leave a voicemail: [x] Yes [] No    Juliana Grimaldo Rep   02/13/23 10:39 EST

## 2023-02-13 NOTE — ANESTHESIA PROCEDURE NOTES
Airway  Urgency: elective    Date/Time: 2/13/2023 8:50 AM  Airway not difficult    General Information and Staff    Patient location during procedure: OR  CRNA/CAA: Merle Sevilla CRNA    Indications and Patient Condition  Indications for airway management: airway protection    Preoxygenated: yes  MILS maintained throughout  Mask difficulty assessment: 0 - not attempted    Final Airway Details  Final airway type: supraglottic airway      Successful airway: unique  Size 4     Number of attempts at approach: 1  Assessment: lips, teeth, and gum same as pre-op

## 2023-02-13 NOTE — H&P
"Vital Signs:   Ht 167.6 cm (65.98\")   Wt 76.2 kg (168 lb)   BMI 27.13 kg/m²   Body mass index is 27.13 kg/m².       HPI:  Jocelyn Gee is a 67 y.o. female who presents today for initial evaluation      History of Present Illness  Mrs. Gee presents to the clinic for initial evaluation of right-sided UPJ obstruction/stricture.  Patient reports her primary care provider ordered a CT with scan with contrast for evaluation of right-sided back pain.  CT scan revealed right-sided extrarenal pelvis versus hydronephrosis hydronephrosis with UPJ obstruction.  Patient states she has not had any back pain since she does report some frequency and urgency.  No stones were identified on CT.  Patient did have an allergic reaction to the contrast dye experiencing itching, hives, and swelling.        Past Medical History:  Medical History        Past Medical History:   Diagnosis Date   • Bronchitis     • Disease of thyroid gland     • Heart murmur     • Hypertension     • Mitral valve problem     • Mitral valve prolapse              Current Meds:  Current Medications          Current Outpatient Medications   Medication Sig Dispense Refill   • clobetasol (TEMOVATE) 0.05 % ointment Apply  topically to the appropriate area as directed 2 (Two) Times a Day As Needed (rash). 60 g 0   • diclofenac (VOLTAREN) 50 MG EC tablet Take 1 tablet by mouth 2 (Two) Times a Day As Needed (joint). 60 tablet 2   • furosemide (Lasix) 20 MG tablet Take 1 tablet by mouth Daily. 90 tablet 1   • levothyroxine (SYNTHROID, LEVOTHROID) 50 MCG tablet Take 1 tablet by mouth Daily. 90 tablet 1   • losartan (COZAAR) 100 MG tablet Take 1 tablet by mouth Daily. 90 tablet 1   • methylPREDNISolone (MEDROL) 4 MG dose pack Take as directed on package instructions. 21 each 0   • potassium chloride (K-DUR,KLOR-CON) 10 MEQ CR tablet Take 1 tablet by mouth 2 (Two) Times a Day. 180 tablet 1   • rosuvastatin (CRESTOR) 10 MG tablet Take 1 tablet by mouth Daily. 90 tablet 1   • " tamsulosin (FLOMAX) 0.4 MG capsule 24 hr capsule Take 1 capsule by mouth Daily. 30 capsule 0      No current facility-administered medications for this visit.            Allergies:        Allergies   Allergen Reactions   • Contrast Dye Itching and Rash         Past Surgical History:  Surgical History         Past Surgical History:   Procedure Laterality Date   • CHOLECYSTECTOMY       • HYSTERECTOMY       • VAGINAL DELIVERY         Girl 1971; Boys- 1974, 1975,1969            Social History:  Social History   Social History            Socioeconomic History   • Marital status:    Tobacco Use   • Smoking status: Every Day       Packs/day: 0.25       Years: 43.00       Pack years: 10.75       Types: Cigarettes       Start date: 1974   • Smokeless tobacco: Never   • Tobacco comments:       Few cigarettes per day   Vaping Use   • Vaping Use: Never used   Substance and Sexual Activity   • Alcohol use: No   • Drug use: No   • Sexual activity: Defer       Comment:  works at [employer] in [place]            Family History:        Family History   Problem Relation Age of Onset   • Diabetes Mother     • Heart disease Mother     • Diabetes Father     • Heart disease Father     • Breast cancer Sister     • Cancer Sister     • Diabetes Maternal Grandfather     • Kidney cancer Maternal Grandfather           Review of Systems:  Review of Systems   Genitourinary: Positive for flank pain, frequency and urgency.         Physical Exam:  Physical Exam  Constitutional:       General: She is not in acute distress.     Appearance: Normal appearance.   HENT:      Head: Normocephalic and atraumatic.      Nose: Nose normal.      Mouth/Throat:      Mouth: Mucous membranes are moist.   Eyes:      Conjunctiva/sclera: Conjunctivae normal.   Cardiovascular:      Rate and Rhythm: Normal rate and regular rhythm.      Pulses: Normal pulses.      Heart sounds: Normal heart sounds.   Pulmonary:      Effort: Pulmonary effort is normal.      Breath  sounds: Normal breath sounds.   Abdominal:      General: Bowel sounds are normal.      Palpations: Abdomen is soft.      Tenderness: There is no right CVA tenderness or left CVA tenderness.   Musculoskeletal:         General: Normal range of motion.      Cervical back: Normal range of motion.   Skin:     General: Skin is warm.   Neurological:      General: No focal deficit present.      Mental Status: She is alert and oriented to person, place, and time.   Psychiatric:         Mood and Affect: Mood normal.         Behavior: Behavior normal.         Thought Content: Thought content normal.         Judgment: Judgment normal.            Recent Image (CT and/or KUB):   CT Abdomen and Pelvis: No results found for this or any previous visit.     CT Stone Protocol: No results found for this or any previous visit.     KUB: No results found for this or any previous visit.        Labs:                       Brief Urine Lab Results  (Last result in the past 365 days)       Color   Clarity   Blood   Leuk Est   Nitrite   Protein   CREAT   Urine HCG         01/09/23 1043 Yellow    Clear    1+    Negative    Negative    Negative                               Office Visit on 01/09/2023   Component Date Value Ref Range Status   • Color 01/09/2023 Yellow  Yellow, Straw, Dark Yellow, Vianey Final   • Clarity, UA 01/09/2023 Clear  Clear Final   • Specific Gravity  01/09/2023 1.015  1.005 - 1.030 Final   • pH, Urine 01/09/2023 6.0  5.0 - 8.0 Final   • Leukocytes 01/09/2023 Negative  Negative Final   • Nitrite, UA 01/09/2023 Negative  Negative Final   • Protein, POC 01/09/2023 Negative  Negative mg/dL Final   • Glucose, UA 01/09/2023 Negative  Negative mg/dL Final   • Ketones, UA 01/09/2023 Negative  Negative Final   • Urobilinogen, UA 01/09/2023 Normal  Normal, 0.2 E.U./dL Final   • Bilirubin 01/09/2023 Negative  Negative Final   • Blood, UA 01/09/2023 1+ (A)  Negative Final   • Lot Number 01/09/2023 [id number]    Final   •  Expiration Date 01/09/2023 2/8/24    Final   Hospital Outpatient Visit on 12/06/2022   Component Date Value Ref Range Status   • Creatinine 12/06/2022 0.80  0.60 - 1.30 mg/dL Final     Serial Number: [id number]:  [id number]   Office Visit on 11/08/2022   Component Date Value Ref Range Status   • Color 11/08/2022 Yellow  Yellow, Straw, Dark Yellow, Vianey Final   • Clarity, UA 11/08/2022 Clear  Clear Final   • Specific Gravity  11/08/2022 1.020  1.005 - 1.030 Final   • pH, Urine 11/08/2022 6.0  5.0 - 8.0 Final   • Leukocytes 11/08/2022 Small (1+) (A)  Negative Final   • Nitrite, UA 11/08/2022 Negative  Negative Final   • Protein, POC 11/08/2022 Trace (A)  Negative mg/dL Final   • Glucose, UA 11/08/2022 Negative  Negative mg/dL Final   • Ketones, UA 11/08/2022 Negative  Negative Final   • Urobilinogen, UA 11/08/2022 0.2 E.U./dL  Normal, 0.2 E.U./dL Final   • Bilirubin 11/08/2022 Negative  Negative Final   • Blood, UA 11/08/2022 Negative  Negative Final   • Lot Number 11/08/2022 [id number]    Final   • Expiration Date 11/08/2022 11/3/23    Final   • Urine Culture 11/08/2022 No growth    Final   Hospital Outpatient Visit on 10/26/2022   Component Date Value Ref Range Status   • Target HR (85%) 10/26/2022 129  bpm Final   • Max. Pred. HR (100%) 10/26/2022 152  bpm Final   • BH CV REST NUCLEAR ISOTOPE DOSE 10/26/2022 10.1  mCi Final   • BH CV STRESS NUCLEAR ISOTOPE DOSE 10/26/2022 30.5  mCi Final   • Nuclear Prior Study 10/26/2022 3.0    Final   • BH CV STRESS PROTOCOL 1 10/26/2022 Pharmacologic    Final   • Stage 1 10/26/2022 1    Final   • HR Stage 1 10/26/2022 99    Final   • BP Stage 1 10/26/2022 140/81    Final   • Duration Min Stage 1 10/26/2022 0    Final   • Duration Sec Stage 1 10/26/2022 10    Final   • Stress Dose Regadenoson Stage 1 10/26/2022 0.4    Final   • Stress Comments Stage 1 10/26/2022 10 sec bolus injection    Final   • Baseline HR 10/26/2022 75  bpm Final   • Baseline BP 10/26/2022 147/78  mmHg Final    • Peak HR 10/26/2022 99  bpm Final   • Percent Max Pred HR 10/26/2022 65.13  % Final   • Percent Target HR 10/26/2022 77  % Final   • Peak BP 10/26/2022 140/81  mmHg Final   • Recovery HR 10/26/2022 80  bpm Final   • Recovery BP 10/26/2022 144/81  mmHg Final         Procedure: None  Procedures      I have reviewed and agree with the above PMH, PSH, FMH, social history, medications, allergies, and labs.      Assessment/Plan:   Problem List Items Addressed This Visit    None      Visit Diagnoses      Hydronephrosis with ureteropelvic junction (UPJ) obstruction    -  Primary     Relevant Medications     tamsulosin (FLOMAX) 0.4 MG capsule 24 hr capsule     Other Relevant Orders     Case Request (Completed)     CBC (No Diff)     Basic Metabolic Panel     Frequency of micturition         Relevant Orders     POC Urinalysis Dipstick, Automated (Completed)             Health Maintenance:        Health Maintenance Due   Topic Date Due   • MAMMOGRAM  Never done   • DXA SCAN  Never done   • ZOSTER VACCINE (3 of 3) 10/23/2017   • Pneumococcal Vaccine 65+ (2 - PCV) 11/28/2018   • COLORECTAL CANCER SCREENING  09/15/2021   • COVID-19 Vaccine (4 - Booster for Moderna series) 05/10/2022         Smoking Counseling: Patient is a current everyday smoker.  Never used smokeless tobacco.  Counseling given however not ready to quit at this time.     Urine Incontinence: Patient reports that she is not currently experiencing any symptoms of urinary incontinence.     Patient was given instructions and counseling regarding her condition or for health maintenance advice. Please see specific information pulled into the AVS if appropriate.     Patient Education:   Hydronephrosis and UPJ obstruction -discussed with the patient his CT scan revealed possible hydronephrosis versus extrarenal pelvis.  I discussed the work-up for this includes cystoscopy with bilateral retrograde pyelogram.  Patient is allergic to contrast dye and I will talk with   Sia to see if this is will be a safe procedure for the patient undergo.  I discussed the use of prednisone and Benadryl prior to study to help with contrast allergy as indicated.  Also discussed the use of uteroscopy for further evaluation if contrast dye is contraindicated per Dr. Stovall.  Discussed the risks and benefits of both of these procedures.  I will schedule her for surgery on 1/30/2023 with Dr. Stovall.  Patient verbalizes understanding and agrees to plan of care.     Visit Diagnoses:  Visit Diagnosis       ICD-10-CM ICD-9-CM   1. Hydronephrosis with ureteropelvic junction (UPJ) obstruction  Q62.11 591   2. Frequency of micturition  R35.0 788.41            Meds Ordered During Visit:       New Medications Ordered This Visit   Medications   • tamsulosin (FLOMAX) 0.4 MG capsule 24 hr capsule       Sig: Take 1 capsule by mouth Daily.       Dispense:  30 capsule       Refill:  0         Follow Up Appointment: Surgery on 1/30/2023  No follow-ups on file.     Sia

## 2023-02-13 NOTE — ANESTHESIA PREPROCEDURE EVALUATION
Anesthesia Evaluation     Patient summary reviewed and Nursing notes reviewed   no history of anesthetic complications:  NPO Solid Status: > 8 hours  NPO Liquid Status: > 8 hours           Airway   Mallampati: II  TM distance: >3 FB  Neck ROM: full  No difficulty expected  Dental    (+) upper dentures and poor dentition    Pulmonary - normal exam    breath sounds clear to auscultation  (+) a smoker Current Smoked day of surgery, shortness of breath,   Cardiovascular - normal exam    ECG reviewed  Rhythm: regular  Rate: normal    (+) hypertension, valvular problems/murmurs MVP, SCHREIBER, hyperlipidemia,       Neuro/Psych- negative ROS  GI/Hepatic/Renal/Endo    (+)   renal disease, thyroid problem hypothyroidism    Musculoskeletal     Abdominal  - normal exam   Substance History - negative use     OB/GYN negative ob/gyn ROS         Other   arthritis, chronic steroid use          Phys Exam Other: Poor dent lower.                  Anesthesia Plan    ASA 3     general     intravenous induction     Anesthetic plan, risks, benefits, and alternatives have been provided, discussed and informed consent has been obtained with: patient.  Pre-procedure education provided  Plan discussed with CRNA.        CODE STATUS:

## 2023-02-13 NOTE — ANESTHESIA POSTPROCEDURE EVALUATION
Patient: Jocelyn Gee    Procedure Summary     Date: 02/13/23 Room / Location: [facility] OR 06 /  [facility] OR    Anesthesia Start: 0842 Anesthesia Stop: 0906    Procedure: CYSTOSCOPY RETROGRADE PYELOGRAM (Bilateral) Diagnosis:       Hydronephrosis with ureteropelvic junction (UPJ) obstruction      (Hydronephrosis with ureteropelvic junction (UPJ) obstruction [Q62.11])    Surgeons: Carlos Alberto Stovall MD Provider: Pedro Tellez DO    Anesthesia Type: general ASA Status: 3          Anesthesia Type: general    Vitals  Vitals Value Taken Time   /70 02/13/23 0937   Temp 97.5 °F (36.4 °C) 02/13/23 0907   Pulse 60 02/13/23 0937   Resp 12 02/13/23 0937   SpO2 95 % 02/13/23 0937           Post Anesthesia Care and Evaluation    Patient location during evaluation: PHASE II  Patient participation: complete - patient participated  Level of consciousness: awake and alert  Pain score: 0  Pain management: adequate    Airway patency: patent  Anesthetic complications: No anesthetic complications  PONV Status: controlled  Cardiovascular status: acceptable  Respiratory status: acceptable and room air  Hydration status: euvolemic  No anesthesia care post op

## 2023-02-13 NOTE — OP NOTE
Jocelyn Gee  2/13/2023    Pre-op Diagnosis:   Hydronephrosis with ureteropelvic junction (UPJ) obstruction [Q62.11]    Post-op Diagnosis:     Post-Op Diagnosis Codes:     * Hydronephrosis with ureteropelvic junction (UPJ) obstruction [Q62.11]    Procedure/CPT® Codes:  Jocelyn Gee is a 67-year-old white female presents  for initial evaluation of right-sided UPJ obstruction/stricture.  Patient reports her primary care provider ordered a CT with scan with contrast for evaluation of right-sided back pain.  CT scan revealed right-sided extrarenal pelvis versus hydronephrosis hydronephrosis with UPJ obstruction.  Patient states she has not had any back pain since she does report some frequency and urgency.  No stones were identified on CT.  Patient did have an allergic reaction to the contrast dye experiencing itching, hives, and swelling.  Following an informed consent brought to the procedure suite prepped and draped in a low dorsolithotomy position normal urethra normal bladder normal trigone.  Left orifice was normal impacted showing a delicate ureter delicate anatomy and delicate upper tracts with emptying by bolus contraction contralaterally had a delicate ureter but an extrarenal pelvis that did drain because of the persistent pain not unlike Dietl's crisis I will set her up for nuclear medicine scan postop       Procedure(s):  CYSTOSCOPY RETROGRADE PYELOGRAM-Bilateral    Surgeon(s):  Carlos Alberto Stovall MD    Anesthesia: see anesthesia record    Staff:   Circulator: Joao Little RN  Scrub Person: Saima Grubbs LPN; Elsa Bullock    Estimated Blood Loss: none  Urine Voided: * No values recorded between 2/13/2023  8:43 AM and 2/13/2023  9:06 AM *    Specimens:                None      Drains: None    Findings: Normal left intrarenal and ureteral anatomy, right extrarenal pelvis calyces were delicate    Blood: N/A    Complications: None    Grafts and Implants: None    Carlos Alberto BRITTON  MD Sia     Date: 2/13/2023  Time: 09:12 EST

## 2023-02-20 ENCOUNTER — TELEPHONE (OUTPATIENT)
Dept: UROLOGY | Facility: CLINIC | Age: 68
End: 2023-02-20
Payer: MEDICARE

## 2023-02-20 NOTE — TELEPHONE ENCOUNTER
Pt called and was asking question about her surgery and exactly what was done I went over what the op report stated and she said she was showing  Improvements after the procedure and would see us back for her follow up.

## 2023-02-28 DIAGNOSIS — I10 ESSENTIAL HYPERTENSION: ICD-10-CM

## 2023-02-28 DIAGNOSIS — E03.4 HYPOTHYROIDISM DUE TO ACQUIRED ATROPHY OF THYROID: ICD-10-CM

## 2023-02-28 RX ORDER — LEVOTHYROXINE SODIUM 0.05 MG/1
50 TABLET ORAL DAILY
Qty: 90 TABLET | Refills: 1 | Status: SHIPPED | OUTPATIENT
Start: 2023-02-28

## 2023-02-28 RX ORDER — FUROSEMIDE 20 MG/1
20 TABLET ORAL DAILY
Qty: 90 TABLET | Refills: 1 | Status: SHIPPED | OUTPATIENT
Start: 2023-02-28

## 2023-02-28 RX ORDER — POTASSIUM CHLORIDE 750 MG/1
10 TABLET, EXTENDED RELEASE ORAL 2 TIMES DAILY
Qty: 180 TABLET | Refills: 1 | Status: SHIPPED | OUTPATIENT
Start: 2023-02-28

## 2023-02-28 RX ORDER — LOSARTAN POTASSIUM 100 MG/1
100 TABLET ORAL DAILY
Qty: 90 TABLET | Refills: 1 | Status: SHIPPED | OUTPATIENT
Start: 2023-02-28

## 2023-02-28 NOTE — TELEPHONE ENCOUNTER
Caller: Jocelyn Gee    Relationship: Self    Best call back number: [phone]    Requested Prescriptions:   Requested Prescriptions     Pending Prescriptions Disp Refills   • losartan (COZAAR) 100 MG tablet 90 tablet 1     Sig: Take 1 tablet by mouth Daily.   • furosemide (Lasix) 20 MG tablet 90 tablet 1     Sig: Take 1 tablet by mouth Daily.   • potassium chloride (K-DUR,KLOR-CON) 10 MEQ CR tablet 180 tablet 1     Sig: Take 1 tablet by mouth 2 (Two) Times a Day.   • levothyroxine (SYNTHROID, LEVOTHROID) 50 MCG tablet 90 tablet 1     Sig: Take 1 tablet by mouth Daily.        Pharmacy where request should be sent: [address] [phone] [facility] [phone] FX     Does the patient have less than a 3 day supply:  [x] Yes  [] No    Would you like a call back once the refill request has been completed: [x] Yes [] No    If the office needs to give you a call back, can they leave a voicemail: [x] Yes [] No    Juliana Le Rep   02/28/23 13:11 EST

## 2023-03-10 DIAGNOSIS — Q62.11 HYDRONEPHROSIS WITH URETEROPELVIC JUNCTION (UPJ) OBSTRUCTION: ICD-10-CM

## 2023-03-10 RX ORDER — TAMSULOSIN HYDROCHLORIDE 0.4 MG/1
CAPSULE ORAL
Qty: 30 CAPSULE | Refills: 0 | Status: SHIPPED | OUTPATIENT
Start: 2023-03-10 | End: 2023-03-21

## 2023-03-13 ENCOUNTER — OFFICE VISIT (OUTPATIENT)
Dept: UROLOGY | Facility: CLINIC | Age: 68
End: 2023-03-13
Payer: MEDICARE

## 2023-03-13 VITALS — BODY MASS INDEX: 26.84 KG/M2 | WEIGHT: 167 LBS | HEIGHT: 66 IN

## 2023-03-13 DIAGNOSIS — Q62.11 HYDRONEPHROSIS WITH URETEROPELVIC JUNCTION (UPJ) OBSTRUCTION: ICD-10-CM

## 2023-03-13 DIAGNOSIS — N13.39 OTHER HYDRONEPHROSIS: Primary | ICD-10-CM

## 2023-03-13 PROCEDURE — 99213 OFFICE O/P EST LOW 20 MIN: CPT | Performed by: UROLOGY

## 2023-03-13 PROCEDURE — 1159F MED LIST DOCD IN RCRD: CPT | Performed by: UROLOGY

## 2023-03-13 PROCEDURE — 1160F RVW MEDS BY RX/DR IN RCRD: CPT | Performed by: UROLOGY

## 2023-03-13 NOTE — PROGRESS NOTES
Chief Complaint:      Chief Complaint   Patient presents with   • Nephrolithiasis     Surgery fu        HPI:   67 y.o. female Jocelyn Gee who presented  for initial evaluation of right-sided UPJ obstruction/stricture.  Patient reports her primary care provider ordered a CT with scan with contrast for evaluation of right-sided back pain.  CT scan revealed right-sided extrarenal pelvis versus hydronephrosis hydronephrosis with UPJ obstruction.  Patient states she has not had any back pain since she does report some frequency and urgency.  No stones were identified on CT.  Patient did have an allergic reaction to the contrast dye experiencing itching, hives, and swelling.  Following an informed consent brought to the procedure suite prepped and draped in a low dorsolithotomy position normal urethra normal bladder normal trigone.  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  I am going to have her stop the alpha blockade we discussed her and characterized her Dietl's crisis.  I am going to get a nuclear medicine scan to make a decision about possible repair of the UPJ obstruction and I will see her back in 2 weeks    Past Medical History:     Past Medical History:   Diagnosis Date   • Arthritis    • Bronchitis    • Disease of thyroid gland    • Heart murmur    • Hypertension    • Mitral valve problem    • Mitral valve prolapse        Current Meds:     Current Outpatient Medications   Medication Sig Dispense Refill   • clobetasol (TEMOVATE) 0.05 % ointment Apply  topically to the appropriate area as directed 2 (Two) Times a Day As Needed (rash). 60 g 0   • diclofenac (VOLTAREN) 50 MG EC tablet Take 1 tablet by  mouth twice daily as needed 180 tablet 1   • furosemide (Lasix) 20 MG tablet Take 1 tablet by mouth Daily. 90 tablet 1   • HYDROcodone-acetaminophen (NORCO)  MG per tablet Take 1 tablet by mouth Every 6 (Six) Hours As Needed for Moderate Pain. 10 tablet 0   • levothyroxine (SYNTHROID, LEVOTHROID) 50 MCG tablet Take 1 tablet by mouth Daily. 90 tablet 1   • losartan (COZAAR) 100 MG tablet Take 1 tablet by mouth Daily. 90 tablet 1   • potassium chloride (K-DUR,KLOR-CON) 10 MEQ CR tablet Take 1 tablet by mouth 2 (Two) Times a Day. 180 tablet 1   • rosuvastatin (CRESTOR) 10 MG tablet Take 1 tablet by mouth once daily 90 tablet 1   • tamsulosin (FLOMAX) 0.4 MG capsule 24 hr capsule Take 1 capsule by mouth once daily 30 capsule 0     No current facility-administered medications for this visit.        Allergies:      Allergies   Allergen Reactions   • Contrast Dye (Echo Or Unknown Ct/Mr) Itching and Rash     Swelling         Past Surgical History:     Past Surgical History:   Procedure Laterality Date   • CHOLECYSTECTOMY     • CYSTOSCOPY RETROGRADE PYELOGRAM Bilateral 2/13/2023    Procedure: CYSTOSCOPY RETROGRADE PYELOGRAM;  Surgeon: Carlos Alberto Stovall MD;  Location: [facility];  Service: Urology;  Laterality: Bilateral;   • HX OVARIAN CYSTECTOMY     • HYSTERECTOMY     • VAGINAL DELIVERY      Girl 1971; Boys- 1974, 1975,1969       Social History:     Social History     Socioeconomic History   • Marital status:    Tobacco Use   • Smoking status: Every Day     Packs/day: 0.25     Years: 43.00     Pack years: 10.75     Types: Cigarettes     Start date: 1974   • Smokeless tobacco: Never   • Tobacco comments:     Few cigarettes per day   Vaping Use   • Vaping Use: Never used   Substance and Sexual Activity   • Alcohol use: No   • Drug use: No   • Sexual activity: Defer     Birth control/protection: Post-menopausal     Comment:  works at [employer] in [employer]       Family History:     Family History   Problem  Relation Age of Onset   • Diabetes Mother    • Heart disease Mother    • Diabetes Father    • Heart disease Father    • Breast cancer Sister    • Cancer Sister    • Diabetes Maternal Grandfather    • Kidney cancer Maternal Grandfather        Review of Systems:     Review of Systems   Constitutional: Negative.  Negative for activity change, appetite change, chills, diaphoresis, fatigue and unexpected weight change.   HENT: Negative for congestion, dental problem, drooling, ear discharge, ear pain, facial swelling, hearing loss, mouth sores, nosebleeds, postnasal drip, rhinorrhea, sinus pressure, sneezing, sore throat, tinnitus, trouble swallowing and voice change.    Eyes: Negative.  Negative for photophobia, pain, discharge, redness, itching and visual disturbance.   Respiratory: Negative.  Negative for apnea, cough, choking, chest tightness, shortness of breath, wheezing and stridor.    Cardiovascular: Negative.  Negative for chest pain, palpitations and leg swelling.   Gastrointestinal: Negative.  Negative for abdominal distention, abdominal pain, anal bleeding, blood in stool, constipation, diarrhea, nausea, rectal pain and vomiting.   Endocrine: Negative.  Negative for cold intolerance, heat intolerance, polydipsia, polyphagia and polyuria.   Musculoskeletal: Negative.  Negative for arthralgias, back pain, gait problem, joint swelling, myalgias, neck pain and neck stiffness.   Skin: Negative.  Negative for color change, pallor, rash and wound.   Allergic/Immunologic: Negative.  Negative for environmental allergies, food allergies and immunocompromised state.   Neurological: Negative.  Negative for dizziness, tremors, seizures, syncope, facial asymmetry, speech difficulty, weakness, light-headedness, numbness and headaches.   Hematological: Negative.  Negative for adenopathy. Does not bruise/bleed easily.   Psychiatric/Behavioral: Negative for agitation, behavioral problems, confusion, decreased concentration,  dysphoric mood, hallucinations, self-injury, sleep disturbance and suicidal ideas. The patient is not nervous/anxious and is not hyperactive.    All other systems reviewed and are negative.      Physical Exam:     Physical Exam  Constitutional:       Appearance: She is well-developed.   HENT:      Head: Normocephalic and atraumatic.      Right Ear: External ear normal.      Left Ear: External ear normal.   Eyes:      Conjunctiva/sclera: Conjunctivae normal.      Pupils: Pupils are equal, round, and reactive to light.   Cardiovascular:      Rate and Rhythm: Normal rate and regular rhythm.      Heart sounds: Normal heart sounds.   Pulmonary:      Effort: Pulmonary effort is normal.      Breath sounds: Normal breath sounds.   Abdominal:      General: Bowel sounds are normal. There is no distension.      Palpations: Abdomen is soft. There is no mass.      Tenderness: There is no abdominal tenderness. There is no guarding or rebound.   Genitourinary:     Vagina: No vaginal discharge.   Musculoskeletal:         General: Normal range of motion.   Skin:     General: Skin is warm and dry.   Neurological:      Mental Status: She is alert.      Deep Tendon Reflexes: Reflexes are normal and symmetric.   Psychiatric:         Behavior: Behavior normal.         Thought Content: Thought content normal.         Judgment: Judgment normal.         I have reviewed the following portions of the patient's history: Allergies, current medications, past family history, past medical history, past social history, past surgical history, problem list, and ROS and confirm it is accurate.    Recent Image (CT and/or KUB):      CT Abdomen and Pelvis: No results found for this or any previous visit.       CT Stone Protocol: No results found for this or any previous visit.       KUB: No results found for this or any previous visit.       Labs (past 3 months):      Pre-Admission Testing on 02/10/2023   Component Date Value Ref Range Status   • WBC  02/10/2023 6.49  3.40 - 10.80 10*3/mm3 Final   • RBC 02/10/2023 4.98  3.77 - 5.28 10*6/mm3 Final   • Hemoglobin 02/10/2023 15.9  12.0 - 15.9 g/dL Final   • Hematocrit 02/10/2023 47.6 (H)  34.0 - 46.6 % Final   • MCV 02/10/2023 95.6  79.0 - 97.0 fL Final   • MCH 02/10/2023 31.9  26.6 - 33.0 pg Final   • MCHC 02/10/2023 33.4  31.5 - 35.7 g/dL Final   • RDW 02/10/2023 14.0  12.3 - 15.4 % Final   • RDW-SD 02/10/2023 49.1  37.0 - 54.0 fl Final   • MPV 02/10/2023 10.0  6.0 - 12.0 fL Final   • Platelets 02/10/2023 264  140 - 450 10*3/mm3 Final   • Glucose 02/10/2023 88  65 - 99 mg/dL Final   • BUN 02/10/2023 11  8 - 23 mg/dL Final   • Creatinine 02/10/2023 0.73  0.57 - 1.00 mg/dL Final   • Sodium 02/10/2023 135 (L)  136 - 145 mmol/L Final   • Potassium 02/10/2023 4.2  3.5 - 5.2 mmol/L Final    Slight hemolysis detected by analyzer. Results may be affected.   • Chloride 02/10/2023 100  98 - 107 mmol/L Final   • CO2 02/10/2023 27.6  22.0 - 29.0 mmol/L Final   • Calcium 02/10/2023 9.3  8.6 - 10.5 mg/dL Final   • BUN/Creatinine Ratio 02/10/2023 15.1  7.0 - 25.0 Final   • Anion Gap 02/10/2023 7.4  5.0 - 15.0 mmol/L Final   • eGFR 02/10/2023 89.7  >60.0 mL/min/1.73 Final   Office Visit on 01/09/2023   Component Date Value Ref Range Status   • Color 01/09/2023 Yellow  Yellow, Straw, Dark Yellow, Vianey Final   • Clarity, UA 01/09/2023 Clear  Clear Final   • Specific Gravity  01/09/2023 1.015  1.005 - 1.030 Final   • pH, Urine 01/09/2023 6.0  5.0 - 8.0 Final   • Leukocytes 01/09/2023 Negative  Negative Final   • Nitrite, UA 01/09/2023 Negative  Negative Final   • Protein, POC 01/09/2023 Negative  Negative mg/dL Final   • Glucose, UA 01/09/2023 Negative  Negative mg/dL Final   • Ketones, UA 01/09/2023 Negative  Negative Final   • Urobilinogen, UA 01/09/2023 Normal  Normal, 0.2 E.U./dL Final   • Bilirubin 01/09/2023 Negative  Negative Final   • Blood, UA 01/09/2023 1+ (A)  Negative Final   • Lot Number 01/09/2023 [id number]    Final   • Expiration Date 01/09/2023 2/8/24   Final        Procedure:       Assessment/Plan:   Hydronephrosis-likely UPJ obstruction with questionable symptomatology.  I am going to recommend a nuclear medicine scan and see her back based on this there was no evidence of urolithiasis    Patient reports that she is not currently experiencing any symptoms of urinary incontinence.                    This document has been electronically signed by TINO ESPINO MD March 13, 2023 13:09 EDT    Dictated Utilizing Dragon Dictation: Part of this note may be an electronic transcription/translation of spoken language to printed text using the Dragon Dictation System.

## 2023-03-17 ENCOUNTER — TELEPHONE (OUTPATIENT)
Dept: UROLOGY | Facility: CLINIC | Age: 68
End: 2023-03-17
Payer: MEDICARE

## 2023-03-17 NOTE — TELEPHONE ENCOUNTER
The pt called and asked when her imaging would be scheduled that Sia had ordered. I told her it was just put in on Monday and gave her the number to radiology scheduling to call and see if they could get her scheduled.

## 2023-03-21 ENCOUNTER — OFFICE VISIT (OUTPATIENT)
Dept: FAMILY MEDICINE CLINIC | Facility: CLINIC | Age: 68
End: 2023-03-21
Payer: MEDICARE

## 2023-03-21 VITALS
SYSTOLIC BLOOD PRESSURE: 134 MMHG | BODY MASS INDEX: 26.92 KG/M2 | HEART RATE: 76 BPM | DIASTOLIC BLOOD PRESSURE: 82 MMHG | WEIGHT: 166.8 LBS | TEMPERATURE: 97.3 F | OXYGEN SATURATION: 97 %

## 2023-03-21 DIAGNOSIS — Z00.00 ENCOUNTER FOR WELLNESS EXAMINATION: Primary | ICD-10-CM

## 2023-03-21 DIAGNOSIS — E03.4 HYPOTHYROIDISM DUE TO ACQUIRED ATROPHY OF THYROID: Chronic | ICD-10-CM

## 2023-03-21 DIAGNOSIS — Q62.11 HYDRONEPHROSIS WITH URETEROPELVIC JUNCTION (UPJ) OBSTRUCTION: ICD-10-CM

## 2023-03-21 DIAGNOSIS — Z00.00 HEALTH CARE MAINTENANCE: ICD-10-CM

## 2023-03-21 DIAGNOSIS — Z12.31 ENCOUNTER FOR SCREENING MAMMOGRAM FOR MALIGNANT NEOPLASM OF BREAST: ICD-10-CM

## 2023-03-21 DIAGNOSIS — L20.82 FLEXURAL ECZEMA: ICD-10-CM

## 2023-03-21 DIAGNOSIS — I10 ESSENTIAL HYPERTENSION: Chronic | ICD-10-CM

## 2023-03-21 DIAGNOSIS — E78.49 OTHER HYPERLIPIDEMIA: Chronic | ICD-10-CM

## 2023-03-21 DIAGNOSIS — Z78.0 POSTMENOPAUSAL: ICD-10-CM

## 2023-03-21 DIAGNOSIS — R91.8 PULMONARY NODULES: Chronic | ICD-10-CM

## 2023-03-21 PROCEDURE — 85027 COMPLETE CBC AUTOMATED: CPT | Performed by: INTERNAL MEDICINE

## 2023-03-21 PROCEDURE — 82550 ASSAY OF CK (CPK): CPT | Performed by: INTERNAL MEDICINE

## 2023-03-21 PROCEDURE — 84439 ASSAY OF FREE THYROXINE: CPT | Performed by: INTERNAL MEDICINE

## 2023-03-21 PROCEDURE — 84443 ASSAY THYROID STIM HORMONE: CPT | Performed by: INTERNAL MEDICINE

## 2023-03-21 PROCEDURE — 80061 LIPID PANEL: CPT | Performed by: INTERNAL MEDICINE

## 2023-03-21 PROCEDURE — 80053 COMPREHEN METABOLIC PANEL: CPT | Performed by: INTERNAL MEDICINE

## 2023-03-21 RX ORDER — CLOBETASOL PROPIONATE 0.5 MG/G
OINTMENT TOPICAL 2 TIMES DAILY PRN
Qty: 60 G | Refills: 11 | Status: SHIPPED | OUTPATIENT
Start: 2023-03-21

## 2023-03-21 NOTE — PROGRESS NOTES
Immunization  Immunization performed in LEFT DELTOID by Thalia Baldwin RN. Patient tolerated the procedure well without complications.  03/21/23   Thalia Baldwin RN         Venipuncture Blood Specimen Collection  Venipuncture performed in LEFT ARM by Thalia Baldwin RN with good hemostasis. Patient tolerated the procedure well without complications.   03/21/23   Thalia Baldwin RN

## 2023-03-21 NOTE — PROGRESS NOTES
The ABCs of the Annual Wellness Visit  Subsequent Medicare Wellness Visit    Subjective    Jocelyn Gee is a 67 y.o. female who presents for a Subsequent Medicare Wellness Visit.    The following portions of the patient's history were reviewed and   updated as appropriate: allergies, current medications, past family history, past medical history, past social history, past surgical history and problem list.    Compared to one year ago, the patient feels her physical   health is worse.    Compared to one year ago, the patient feels her mental   health is the same.    Recent Hospitalizations:  She was not admitted within the past 365 days at any hospital.       Current Medical Providers:  Patient Care Team:  Meenakshi Nunez DO as PCP - General (Family Medicine)    Outpatient Medications Prior to Visit   Medication Sig Dispense Refill   • diclofenac (VOLTAREN) 50 MG EC tablet Take 1 tablet by mouth twice daily as needed 180 tablet 1   • furosemide (Lasix) 20 MG tablet Take 1 tablet by mouth Daily. 90 tablet 1   • levothyroxine (SYNTHROID, LEVOTHROID) 50 MCG tablet Take 1 tablet by mouth Daily. 90 tablet 1   • losartan (COZAAR) 100 MG tablet Take 1 tablet by mouth Daily. 90 tablet 1   • potassium chloride (K-DUR,KLOR-CON) 10 MEQ CR tablet Take 1 tablet by mouth 2 (Two) Times a Day. 180 tablet 1   • rosuvastatin (CRESTOR) 10 MG tablet Take 1 tablet by mouth once daily 90 tablet 1   • clobetasol (TEMOVATE) 0.05 % ointment Apply  topically to the appropriate area as directed 2 (Two) Times a Day As Needed (rash). 60 g 0   • HYDROcodone-acetaminophen (NORCO)  MG per tablet Take 1 tablet by mouth Every 6 (Six) Hours As Needed for Moderate Pain. 10 tablet 0   • tamsulosin (FLOMAX) 0.4 MG capsule 24 hr capsule Take 1 capsule by mouth once daily 30 capsule 0     No facility-administered medications prior to visit.       No opioid medication identified on active medication list. I have reviewed chart for other potential   "high risk medication/s and harmful drug interactions in the elderly.          Aspirin is not on active medication list.  Aspirin use is not indicated based on review of current medical condition/s. Risk of harm outweighs potential benefits.      Patient Active Problem List   Diagnosis   • Abdominal pain   • CN (constipation)   • Disease of gallbladder   • Billowing mitral valve   • Hypothyroidism due to acquired atrophy of thyroid   • Other hyperlipidemia   • Essential hypertension   • Other atopic dermatitis   • Pulmonary nodules   • Hydronephrosis with ureteropelvic junction (UPJ) obstruction   • Encounter for wellness examination   • Health care maintenance   • Flexural eczema     Advance Care Planning  Advance Directive is not on file.  ACP discussion was held with the patient during this visit. Patient does not have an advance directive, information provided.     Objective    Vitals:    03/21/23 0825   BP: 134/82   BP Location: Left arm   Patient Position: Sitting   Cuff Size: Adult   Pulse: 76   Temp: 97.3 °F (36.3 °C)   TempSrc: Temporal   SpO2: 97%   Weight: 75.7 kg (166 lb 12.8 oz)   PainSc: 0-No pain     Estimated body mass index is 26.92 kg/m² as calculated from the following:    Height as of 3/13/23: 167.6 cm (66\").    Weight as of this encounter: 75.7 kg (166 lb 12.8 oz).    BMI is >= 25 and <30. (Overweight) The following options were offered after discussion;: nutrition counseling/recommendations      Does the patient have evidence of cognitive impairment?   No            HEALTH RISK ASSESSMENT    Smoking Status:  Social History     Tobacco Use   Smoking Status Every Day   • Packs/day: 0.25   • Years: 43.00   • Pack years: 10.75   • Types: Cigarettes   • Start date: 1974   Smokeless Tobacco Never   Tobacco Comments    Few cigarettes per day     Alcohol Consumption:  Social History     Substance and Sexual Activity   Alcohol Use No     Fall Risk Screen:    PALAKADI Fall Risk Assessment was completed, and " patient is at MODERATE risk for falls. Assessment completed on:3/21/2023    Depression Screening:  PHQ-2/PHQ-9 Depression Screening 3/21/2023   Little Interest or Pleasure in Doing Things 0-->not at all   Feeling Down, Depressed or Hopeless 0-->not at all   PHQ-9: Brief Depression Severity Measure Score 0       Health Habits and Functional and Cognitive Screening:  Functional & Cognitive Status 3/21/2023   Do you have difficulty preparing food and eating? No   Do you have difficulty bathing yourself, getting dressed or grooming yourself? No   Do you have difficulty using the toilet? No   Do you have difficulty moving around from place to place? No   Do you have trouble with steps or getting out of a bed or a chair? No   Current Diet Well Balanced Diet   Dental Exam Not up to date   Eye Exam Up to date   Exercise (times per week) 0 times per week   Current Exercises Include No Regular Exercise   Do you need help using the phone?  No   Are you deaf or do you have serious difficulty hearing?  No   Do you need help with transportation? No   Do you need help shopping? No   Do you need help preparing meals?  No   Do you need help with housework?  No   Do you need help with laundry? No   Do you need help taking your medications? No   Do you need help managing money? No   Do you ever drive or ride in a car without wearing a seat belt? No   Have you felt unusual stress, anger or loneliness in the last month? Yes   Who do you live with? Alone   If you need help, do you have trouble finding someone available to you? No   Have you been bothered in the last four weeks by sexual problems? No   Do you have difficulty concentrating, remembering or making decisions? No       Age-appropriate Screening Schedule:  Refer to the list below for future screening recommendations based on patient's age, sex and/or medical conditions. Orders for these recommended tests are listed in the plan section. The patient has been provided with a  written plan.    Health Maintenance   Topic Date Due   • MAMMOGRAM  Never done   • DXA SCAN  Never done   • ZOSTER VACCINE (3 of 3) 10/23/2017   • Pneumococcal Vaccine 65+ (2 - PCV) 11/28/2018   • COLORECTAL CANCER SCREENING  09/15/2021   • COVID-19 Vaccine (4 - Booster for Moderna series) 05/10/2022   • ANNUAL WELLNESS VISIT  03/10/2023   • LIPID PANEL  09/13/2023   • TDAP/TD VACCINES (2 - Td or Tdap) 07/12/2027   • HEPATITIS C SCREENING  Completed   • INFLUENZA VACCINE  Completed                CMS Preventative Services Quick Reference  Risk Factors Identified During Encounter:    Immunizations Discussed/Encouraged: Prevnar 20 (Pneumococcal 20-valent conjugate) and Shingrix. We will update Prevnar today. I encouraged her to update Shingrix at her pharmacy.    Age appropriate screenings were discussed with patient today. She plans to update CT chest next month as scheduled for follow up on lung nodules. We will schedule mammogram and dexa soon. Patient's chart says that she is due for colonoscopy, which she declines. She is up to date on cologuard, done in 9/21 and negative.    I encouraged tobacco cessation. Patient is trying to quit.     I encouraged patient to follow up with urology as planned. I reviewed urology notes today.     The above risks/problems have been discussed with the patient.  Pertinent information has been shared with the patient in the After Visit Summary.    Diagnoses and all orders for this visit:    1. Encounter for wellness examination (Primary)    2. Health care maintenance    3. Flexural eczema  -     clobetasol (TEMOVATE) 0.05 % ointment; Apply  topically to the appropriate area as directed 2 (Two) Times a Day As Needed (rash).  Dispense: 60 g; Refill: 11    4. Pulmonary nodules    5. Hydronephrosis with ureteropelvic junction (UPJ) obstruction    6. Hypothyroidism due to acquired atrophy of thyroid  -     Comprehensive Metabolic Panel  -     CBC (No Diff)  -     Lipid Panel  -      TSH  -     T4, Free  -     CK    7. Essential hypertension  -     Comprehensive Metabolic Panel  -     CBC (No Diff)  -     Lipid Panel  -     TSH  -     T4, Free  -     CK    8. Other hyperlipidemia  -     Comprehensive Metabolic Panel  -     CBC (No Diff)  -     Lipid Panel  -     TSH  -     T4, Free  -     CK    9. Postmenopausal  -     DEXA Bone Density Axial; Future    10. Encounter for screening mammogram for malignant neoplasm of breast  -     Mammo Screening Digital Tomosynthesis Bilateral With CAD; Future    Other orders  -     Pneumococcal Conjugate Vaccine 20-Valent All        Follow Up:   Next Medicare Wellness visit to be scheduled in 1 year.  We will plan on a 3 month office visit.     An After Visit Summary and PPPS were made available to the patient.        This document has been electronically signed by Meenakshi Nunez DO  March 21, 2023 08:54 EDT

## 2023-03-22 LAB
ALBUMIN SERPL-MCNC: 4.3 G/DL (ref 3.5–5.2)
ALBUMIN/GLOB SERPL: 1.5 G/DL
ALP SERPL-CCNC: 92 U/L (ref 39–117)
ALT SERPL W P-5'-P-CCNC: 19 U/L (ref 1–33)
ANION GAP SERPL CALCULATED.3IONS-SCNC: 7 MMOL/L (ref 5–15)
AST SERPL-CCNC: 20 U/L (ref 1–32)
BILIRUB SERPL-MCNC: 0.3 MG/DL (ref 0–1.2)
BUN SERPL-MCNC: 16 MG/DL (ref 8–23)
BUN/CREAT SERPL: 19 (ref 7–25)
CALCIUM SPEC-SCNC: 9.3 MG/DL (ref 8.6–10.5)
CHLORIDE SERPL-SCNC: 103 MMOL/L (ref 98–107)
CHOLEST SERPL-MCNC: 139 MG/DL (ref 0–200)
CK SERPL-CCNC: 68 U/L (ref 20–180)
CO2 SERPL-SCNC: 27 MMOL/L (ref 22–29)
CREAT SERPL-MCNC: 0.84 MG/DL (ref 0.57–1)
DEPRECATED RDW RBC AUTO: 44 FL (ref 37–54)
EGFRCR SERPLBLD CKD-EPI 2021: 75.8 ML/MIN/1.73
ERYTHROCYTE [DISTWIDTH] IN BLOOD BY AUTOMATED COUNT: 12.9 % (ref 12.3–15.4)
GLOBULIN UR ELPH-MCNC: 2.8 GM/DL
GLUCOSE SERPL-MCNC: 94 MG/DL (ref 65–99)
HCT VFR BLD AUTO: 44.3 % (ref 34–46.6)
HDLC SERPL-MCNC: 47 MG/DL (ref 40–60)
HGB BLD-MCNC: 15.3 G/DL (ref 12–15.9)
LDLC SERPL CALC-MCNC: 73 MG/DL (ref 0–100)
LDLC/HDLC SERPL: 1.53 {RATIO}
MCH RBC QN AUTO: 31.5 PG (ref 26.6–33)
MCHC RBC AUTO-ENTMCNC: 34.5 G/DL (ref 31.5–35.7)
MCV RBC AUTO: 91.3 FL (ref 79–97)
PLATELET # BLD AUTO: 234 10*3/MM3 (ref 140–450)
PMV BLD AUTO: 11.6 FL (ref 6–12)
POTASSIUM SERPL-SCNC: 4.7 MMOL/L (ref 3.5–5.2)
PROT SERPL-MCNC: 7.1 G/DL (ref 6–8.5)
RBC # BLD AUTO: 4.85 10*6/MM3 (ref 3.77–5.28)
SODIUM SERPL-SCNC: 137 MMOL/L (ref 136–145)
T4 FREE SERPL-MCNC: 1.66 NG/DL (ref 0.93–1.7)
TRIGL SERPL-MCNC: 101 MG/DL (ref 0–150)
TSH SERPL DL<=0.05 MIU/L-ACNC: 3.7 UIU/ML (ref 0.27–4.2)
VLDLC SERPL-MCNC: 19 MG/DL (ref 5–40)
WBC NRBC COR # BLD: 5.5 10*3/MM3 (ref 3.4–10.8)

## 2023-03-28 NOTE — ADDENDUM NOTE
Addended by: TINO ESPINO on: 1/30/2023 09:32 AM     Modules accepted: Nakul     Patient comes to clinic for follow up anticoagulation visit.  Last INR on 2/28 was 3.3.  Dose maintained.   Today's INR is 3.0 and is within goal range.    Current warfarin total weekly dose of 17 mg verified.  Informed the INR result is within therapeutic range and instructed to maintain current dose per protocol. Discussed dose and return date of 4/25/23 for next INR. See Anticoagulation flowsheet.    Dr. Kerr is in the office today supervising the treatment.    Instructed to contact the clinic with any unusual bleeding or bruising, any changes in medications, diet, health status, lifestyle, or any other changes, questions or concerns. Verbalized understanding of all discussed.

## 2023-04-12 ENCOUNTER — HOSPITAL ENCOUNTER (OUTPATIENT)
Dept: CT IMAGING | Facility: HOSPITAL | Age: 68
Discharge: HOME | End: 2023-04-12
Admitting: INTERNAL MEDICINE
Payer: MEDICARE

## 2023-04-12 ENCOUNTER — HOSPITAL ENCOUNTER (OUTPATIENT)
Dept: NUCLEAR MEDICINE | Facility: HOSPITAL | Age: 68
Discharge: HOME | End: 2023-04-12
Payer: MEDICARE

## 2023-04-12 DIAGNOSIS — N13.39 OTHER HYDRONEPHROSIS: ICD-10-CM

## 2023-04-12 DIAGNOSIS — R91.8 MULTIPLE LUNG NODULES: ICD-10-CM

## 2023-04-12 PROCEDURE — 71250 CT THORAX DX C-: CPT

## 2023-04-12 PROCEDURE — 25010000002 FUROSEMIDE PER 20 MG: Performed by: UROLOGY

## 2023-04-12 PROCEDURE — A9562 TC99M MERTIATIDE: HCPCS | Performed by: UROLOGY

## 2023-04-12 PROCEDURE — 71250 CT THORAX DX C-: CPT | Performed by: RADIOLOGY

## 2023-04-12 PROCEDURE — 0 TECHNETIUM MERTIATIDE: Performed by: UROLOGY

## 2023-04-12 PROCEDURE — 78708 K FLOW/FUNCT IMAGE W/DRUG: CPT | Performed by: RADIOLOGY

## 2023-04-12 PROCEDURE — 78708 K FLOW/FUNCT IMAGE W/DRUG: CPT

## 2023-04-12 RX ORDER — FUROSEMIDE 10 MG/ML
0.3 INJECTION INTRAMUSCULAR; INTRAVENOUS
Status: COMPLETED | OUTPATIENT
Start: 2023-04-12 | End: 2023-04-12

## 2023-04-12 RX ADMIN — FUROSEMIDE 22.7 MG: 10 INJECTION, SOLUTION INTRAMUSCULAR; INTRAVENOUS at 13:25

## 2023-04-12 RX ADMIN — TECHNESCAN TC 99M MERTIATIDE 1 DOSE: 1 INJECTION, POWDER, LYOPHILIZED, FOR SOLUTION INTRAVENOUS at 13:18

## 2023-04-18 ENCOUNTER — OFFICE VISIT (OUTPATIENT)
Dept: UROLOGY | Facility: CLINIC | Age: 68
End: 2023-04-18
Payer: MEDICARE

## 2023-04-18 VITALS
DIASTOLIC BLOOD PRESSURE: 77 MMHG | WEIGHT: 166 LBS | HEIGHT: 66 IN | BODY MASS INDEX: 26.68 KG/M2 | HEART RATE: 68 BPM | SYSTOLIC BLOOD PRESSURE: 141 MMHG

## 2023-04-18 DIAGNOSIS — Q62.11 HYDRONEPHROSIS WITH URETEROPELVIC JUNCTION (UPJ) OBSTRUCTION: Primary | ICD-10-CM

## 2023-04-18 PROCEDURE — 3077F SYST BP >= 140 MM HG: CPT | Performed by: UROLOGY

## 2023-04-18 PROCEDURE — 1159F MED LIST DOCD IN RCRD: CPT | Performed by: UROLOGY

## 2023-04-18 PROCEDURE — 1160F RVW MEDS BY RX/DR IN RCRD: CPT | Performed by: UROLOGY

## 2023-04-18 PROCEDURE — 3078F DIAST BP <80 MM HG: CPT | Performed by: UROLOGY

## 2023-04-18 PROCEDURE — 99213 OFFICE O/P EST LOW 20 MIN: CPT | Performed by: UROLOGY

## 2023-04-18 NOTE — PROGRESS NOTES
Chief Complaint:      Chief Complaint   Patient presents with   • Hydronephrosis       HPI:   67 y.o. female turns today accompanied by her daughter we reviewed her nuclear medicine scan with a flattened curve she has an obvious extrarenal pelvis probably a mild UPJ obstruction given her age and the paucity of symptomatology I recommend observation and follow-up I will see her back in 1 year.  Discussed the surgical intervention I described both robotic and open pyeloplasty.  I do not think his symptomatology warrants aggressive intervention    Past Medical History:     Past Medical History:   Diagnosis Date   • Arthritis    • Bronchitis    • Disease of thyroid gland    • Heart murmur    • Hypertension    • Mitral valve problem    • Mitral valve prolapse        Current Meds:     Current Outpatient Medications   Medication Sig Dispense Refill   • clobetasol (TEMOVATE) 0.05 % ointment Apply  topically to the appropriate area as directed 2 (Two) Times a Day As Needed (rash). 60 g 11   • diclofenac (VOLTAREN) 50 MG EC tablet Take 1 tablet by mouth twice daily as needed 180 tablet 1   • furosemide (Lasix) 20 MG tablet Take 1 tablet by mouth Daily. 90 tablet 1   • levothyroxine (SYNTHROID, LEVOTHROID) 50 MCG tablet Take 1 tablet by mouth Daily. 90 tablet 1   • losartan (COZAAR) 100 MG tablet Take 1 tablet by mouth Daily. 90 tablet 1   • potassium chloride (K-DUR,KLOR-CON) 10 MEQ CR tablet Take 1 tablet by mouth 2 (Two) Times a Day. 180 tablet 1   • rosuvastatin (CRESTOR) 10 MG tablet Take 1 tablet by mouth once daily 90 tablet 1     No current facility-administered medications for this visit.        Allergies:      Allergies   Allergen Reactions   • Contrast Dye (Echo Or Unknown Ct/Mr) Itching and Rash     Swelling         Past Surgical History:     Past Surgical History:   Procedure Laterality Date   • CHOLECYSTECTOMY     • CYSTOSCOPY RETROGRADE PYELOGRAM Bilateral 2/13/2023    Procedure: CYSTOSCOPY RETROGRADE PYELOGRAM;   Surgeon: Carlos Alberto Stovall MD;  Location: [facility];  Service: Urology;  Laterality: Bilateral;   • HX OVARIAN CYSTECTOMY     • HYSTERECTOMY     • VAGINAL DELIVERY      Girl 1971; Boys- 1974, 1975,1969       Social History:     Social History     Socioeconomic History   • Marital status:    Tobacco Use   • Smoking status: Every Day     Packs/day: 0.25     Years: 43.00     Pack years: 10.75     Types: Cigarettes     Start date: 1974   • Smokeless tobacco: Never   • Tobacco comments:     Few cigarettes per day   Vaping Use   • Vaping Use: Never used   Substance and Sexual Activity   • Alcohol use: No   • Drug use: No   • Sexual activity: Defer     Birth control/protection: Post-menopausal     Comment:  works at [employer] in [email or web address]       Family History:     Family History   Problem Relation Age of Onset   • Diabetes Mother    • Heart disease Mother    • Diabetes Father    • Heart disease Father    • Breast cancer Sister    • Cancer Sister    • Diabetes Maternal Grandfather    • Kidney cancer Maternal Grandfather        Review of Systems:     Review of Systems   Constitutional: Negative.  Negative for activity change, appetite change, chills, diaphoresis, fatigue and unexpected weight change.   HENT: Negative for congestion, dental problem, drooling, ear discharge, ear pain, facial swelling, hearing loss, mouth sores, nosebleeds, postnasal drip, rhinorrhea, sinus pressure, sneezing, sore throat, tinnitus, trouble swallowing and voice change.    Eyes: Negative.  Negative for photophobia, pain, discharge, redness, itching and visual disturbance.   Respiratory: Negative.  Negative for apnea, cough, choking, chest tightness, shortness of breath, wheezing and stridor.    Cardiovascular: Negative.  Negative for chest pain, palpitations and leg swelling.   Gastrointestinal: Negative.  Negative for abdominal distention, abdominal pain, anal bleeding, blood in stool, constipation, diarrhea, nausea, rectal pain  and vomiting.   Endocrine: Negative.  Negative for cold intolerance, heat intolerance, polydipsia, polyphagia and polyuria.   Musculoskeletal: Negative.  Negative for arthralgias, back pain, gait problem, joint swelling, myalgias, neck pain and neck stiffness.   Skin: Negative.  Negative for color change, pallor, rash and wound.   Allergic/Immunologic: Negative.  Negative for environmental allergies, food allergies and immunocompromised state.   Neurological: Negative.  Negative for dizziness, tremors, seizures, syncope, facial asymmetry, speech difficulty, weakness, light-headedness, numbness and headaches.   Hematological: Negative.  Negative for adenopathy. Does not bruise/bleed easily.   Psychiatric/Behavioral: Negative for agitation, behavioral problems, confusion, decreased concentration, dysphoric mood, hallucinations, self-injury, sleep disturbance and suicidal ideas. The patient is not nervous/anxious and is not hyperactive.    All other systems reviewed and are negative.      Physical Exam:     Physical Exam  Constitutional:       Appearance: She is well-developed.   HENT:      Head: Normocephalic and atraumatic.      Right Ear: External ear normal.      Left Ear: External ear normal.   Eyes:      Conjunctiva/sclera: Conjunctivae normal.      Pupils: Pupils are equal, round, and reactive to light.   Cardiovascular:      Rate and Rhythm: Normal rate and regular rhythm.      Heart sounds: Normal heart sounds.   Pulmonary:      Effort: Pulmonary effort is normal.      Breath sounds: Normal breath sounds.   Abdominal:      General: Bowel sounds are normal. There is no distension.      Palpations: Abdomen is soft. There is no mass.      Tenderness: There is no abdominal tenderness. There is no guarding or rebound.   Genitourinary:     Vagina: No vaginal discharge.   Musculoskeletal:         General: Normal range of motion.   Skin:     General: Skin is warm and dry.   Neurological:      Mental Status: She is  alert.      Deep Tendon Reflexes: Reflexes are normal and symmetric.   Psychiatric:         Behavior: Behavior normal.         Thought Content: Thought content normal.         Judgment: Judgment normal.         I have reviewed the following portions of the patient's history: Allergies, current medications, past family history, past medical history, past social history, past surgical history, problem list, and ROS and confirm it is accurate.    Recent Image (CT and/or KUB):      CT Abdomen and Pelvis: No results found for this or any previous visit.       CT Stone Protocol: No results found for this or any previous visit.       KUB: No results found for this or any previous visit.       Labs (past 3 months):      Office Visit on 03/21/2023   Component Date Value Ref Range Status   • Glucose 03/21/2023 94  65 - 99 mg/dL Final   • BUN 03/21/2023 16  8 - 23 mg/dL Final   • Creatinine 03/21/2023 0.84  0.57 - 1.00 mg/dL Final   • Sodium 03/21/2023 137  136 - 145 mmol/L Final   • Potassium 03/21/2023 4.7  3.5 - 5.2 mmol/L Final   • Chloride 03/21/2023 103  98 - 107 mmol/L Final   • CO2 03/21/2023 27.0  22.0 - 29.0 mmol/L Final   • Calcium 03/21/2023 9.3  8.6 - 10.5 mg/dL Final   • Total Protein 03/21/2023 7.1  6.0 - 8.5 g/dL Final   • Albumin 03/21/2023 4.3  3.5 - 5.2 g/dL Final   • ALT (SGPT) 03/21/2023 19  1 - 33 U/L Final   • AST (SGOT) 03/21/2023 20  1 - 32 U/L Final   • Alkaline Phosphatase 03/21/2023 92  39 - 117 U/L Final   • Total Bilirubin 03/21/2023 0.3  0.0 - 1.2 mg/dL Final   • Globulin 03/21/2023 2.8  gm/dL Final   • A/G Ratio 03/21/2023 1.5  g/dL Final   • BUN/Creatinine Ratio 03/21/2023 19.0  7.0 - 25.0 Final   • Anion Gap 03/21/2023 7.0  5.0 - 15.0 mmol/L Final   • eGFR 03/21/2023 75.8  >60.0 mL/min/1.73 Final   • WBC 03/21/2023 5.50  3.40 - 10.80 10*3/mm3 Final   • RBC 03/21/2023 4.85  3.77 - 5.28 10*6/mm3 Final   • Hemoglobin 03/21/2023 15.3  12.0 - 15.9 g/dL Final   • Hematocrit 03/21/2023 44.3  34.0 -  46.6 % Final   • MCV 03/21/2023 91.3  79.0 - 97.0 fL Final   • MCH 03/21/2023 31.5  26.6 - 33.0 pg Final   • MCHC 03/21/2023 34.5  31.5 - 35.7 g/dL Final   • RDW 03/21/2023 12.9  12.3 - 15.4 % Final   • RDW-SD 03/21/2023 44.0  37.0 - 54.0 fl Final   • MPV 03/21/2023 11.6  6.0 - 12.0 fL Final   • Platelets 03/21/2023 234  140 - 450 10*3/mm3 Final   • Total Cholesterol 03/21/2023 139  0 - 200 mg/dL Final   • Triglycerides 03/21/2023 101  0 - 150 mg/dL Final   • HDL Cholesterol 03/21/2023 47  40 - 60 mg/dL Final   • LDL Cholesterol  03/21/2023 73  0 - 100 mg/dL Final   • VLDL Cholesterol 03/21/2023 19  5 - 40 mg/dL Final   • LDL/HDL Ratio 03/21/2023 1.53   Final   • TSH 03/21/2023 3.700  0.270 - 4.200 uIU/mL Final   • Free T4 03/21/2023 1.66  0.93 - 1.70 ng/dL Final   • Creatine Kinase 03/21/2023 68  20 - 180 U/L Final   Pre-Admission Testing on 02/10/2023   Component Date Value Ref Range Status   • WBC 02/10/2023 6.49  3.40 - 10.80 10*3/mm3 Final   • RBC 02/10/2023 4.98  3.77 - 5.28 10*6/mm3 Final   • Hemoglobin 02/10/2023 15.9  12.0 - 15.9 g/dL Final   • Hematocrit 02/10/2023 47.6 (H)  34.0 - 46.6 % Final   • MCV 02/10/2023 95.6  79.0 - 97.0 fL Final   • MCH 02/10/2023 31.9  26.6 - 33.0 pg Final   • MCHC 02/10/2023 33.4  31.5 - 35.7 g/dL Final   • RDW 02/10/2023 14.0  12.3 - 15.4 % Final   • RDW-SD 02/10/2023 49.1  37.0 - 54.0 fl Final   • MPV 02/10/2023 10.0  6.0 - 12.0 fL Final   • Platelets 02/10/2023 264  140 - 450 10*3/mm3 Final   • Glucose 02/10/2023 88  65 - 99 mg/dL Final   • BUN 02/10/2023 11  8 - 23 mg/dL Final   • Creatinine 02/10/2023 0.73  0.57 - 1.00 mg/dL Final   • Sodium 02/10/2023 135 (L)  136 - 145 mmol/L Final   • Potassium 02/10/2023 4.2  3.5 - 5.2 mmol/L Final    Slight hemolysis detected by analyzer. Results may be affected.   • Chloride 02/10/2023 100  98 - 107 mmol/L Final   • CO2 02/10/2023 27.6  22.0 - 29.0 mmol/L Final   • Calcium 02/10/2023 9.3  8.6 - 10.5 mg/dL Final   • BUN/Creatinine  Ratio 02/10/2023 15.1  7.0 - 25.0 Final   • Anion Gap 02/10/2023 7.4  5.0 - 15.0 mmol/L Final   • eGFR 02/10/2023 89.7  >60.0 mL/min/1.73 Final        Procedure:       Assessment/Plan:   Right ureteropelvic junction obstruction.  Nuclear medicine scan is equivocal she has no symptomatology given the mildness of the symptomatology.  The obvious excellent function of the kidney I Buffy recommend observation    Patient reports that she is not currently experiencing any symptoms of urinary incontinence.                    This document has been electronically signed by TINO ESPINO MD April 18, 2023 13:46 EDT    Dictated Utilizing Dragon Dictation: Part of this note may be an electronic transcription/translation of spoken language to printed text using the Dragon Dictation System.

## 2023-06-21 PROBLEM — M13.0 POLYARTHRITIS: Status: ACTIVE | Noted: 2023-06-21

## 2023-09-14 ENCOUNTER — CLINICAL SUPPORT (OUTPATIENT)
Dept: FAMILY MEDICINE CLINIC | Facility: CLINIC | Age: 68
End: 2023-09-14
Payer: MEDICARE

## 2023-09-14 DIAGNOSIS — I10 ESSENTIAL HYPERTENSION: ICD-10-CM

## 2023-09-14 DIAGNOSIS — E03.4 HYPOTHYROIDISM DUE TO ACQUIRED ATROPHY OF THYROID: ICD-10-CM

## 2023-09-14 DIAGNOSIS — E78.49 OTHER HYPERLIPIDEMIA: Chronic | ICD-10-CM

## 2023-09-14 LAB
ALBUMIN SERPL-MCNC: 4.2 G/DL (ref 3.5–5.2)
ALBUMIN/GLOB SERPL: 1.4 G/DL
ALP SERPL-CCNC: 78 U/L (ref 39–117)
ALT SERPL W P-5'-P-CCNC: 16 U/L (ref 1–33)
ANION GAP SERPL CALCULATED.3IONS-SCNC: 9.9 MMOL/L (ref 5–15)
AST SERPL-CCNC: 17 U/L (ref 1–32)
BILIRUB SERPL-MCNC: 0.4 MG/DL (ref 0–1.2)
BUN SERPL-MCNC: 15 MG/DL (ref 8–23)
BUN/CREAT SERPL: 16.9 (ref 7–25)
CALCIUM SPEC-SCNC: 9.5 MG/DL (ref 8.6–10.5)
CHLORIDE SERPL-SCNC: 101 MMOL/L (ref 98–107)
CHOLEST SERPL-MCNC: 169 MG/DL (ref 0–200)
CK SERPL-CCNC: 82 U/L (ref 20–180)
CO2 SERPL-SCNC: 25.1 MMOL/L (ref 22–29)
CREAT SERPL-MCNC: 0.89 MG/DL (ref 0.57–1)
DEPRECATED RDW RBC AUTO: 42.8 FL (ref 37–54)
EGFRCR SERPLBLD CKD-EPI 2021: 70.3 ML/MIN/1.73
ERYTHROCYTE [DISTWIDTH] IN BLOOD BY AUTOMATED COUNT: 12.6 % (ref 12.3–15.4)
GLOBULIN UR ELPH-MCNC: 3 GM/DL
GLUCOSE SERPL-MCNC: 88 MG/DL (ref 65–99)
HCT VFR BLD AUTO: 44.7 % (ref 34–46.6)
HDLC SERPL-MCNC: 49 MG/DL (ref 40–60)
HGB BLD-MCNC: 15.4 G/DL (ref 12–15.9)
LDLC SERPL CALC-MCNC: 104 MG/DL (ref 0–100)
LDLC/HDLC SERPL: 2.11 {RATIO}
MCH RBC QN AUTO: 32 PG (ref 26.6–33)
MCHC RBC AUTO-ENTMCNC: 34.5 G/DL (ref 31.5–35.7)
MCV RBC AUTO: 92.9 FL (ref 79–97)
PLATELET # BLD AUTO: 239 10*3/MM3 (ref 140–450)
PMV BLD AUTO: 10.9 FL (ref 6–12)
POTASSIUM SERPL-SCNC: 4.3 MMOL/L (ref 3.5–5.2)
PROT SERPL-MCNC: 7.2 G/DL (ref 6–8.5)
RBC # BLD AUTO: 4.81 10*6/MM3 (ref 3.77–5.28)
SODIUM SERPL-SCNC: 136 MMOL/L (ref 136–145)
T4 FREE SERPL-MCNC: 1.68 NG/DL (ref 0.93–1.7)
TRIGL SERPL-MCNC: 84 MG/DL (ref 0–150)
TSH SERPL DL<=0.05 MIU/L-ACNC: 4.2 UIU/ML (ref 0.27–4.2)
VLDLC SERPL-MCNC: 16 MG/DL (ref 5–40)
WBC NRBC COR # BLD: 4.54 10*3/MM3 (ref 3.4–10.8)

## 2023-09-14 PROCEDURE — 80053 COMPREHEN METABOLIC PANEL: CPT | Performed by: INTERNAL MEDICINE

## 2023-09-14 PROCEDURE — 84443 ASSAY THYROID STIM HORMONE: CPT | Performed by: INTERNAL MEDICINE

## 2023-09-14 PROCEDURE — 82550 ASSAY OF CK (CPK): CPT | Performed by: INTERNAL MEDICINE

## 2023-09-14 PROCEDURE — 84439 ASSAY OF FREE THYROXINE: CPT | Performed by: INTERNAL MEDICINE

## 2023-09-14 PROCEDURE — 85027 COMPLETE CBC AUTOMATED: CPT | Performed by: INTERNAL MEDICINE

## 2023-09-14 PROCEDURE — 80061 LIPID PANEL: CPT | Performed by: INTERNAL MEDICINE

## 2023-09-14 PROCEDURE — 36415 COLL VENOUS BLD VENIPUNCTURE: CPT | Performed by: INTERNAL MEDICINE

## 2023-09-14 NOTE — PROGRESS NOTES
Venipuncture Blood Specimen Collection  Venipuncture performed in right arm by Noreen Moulton MA with good hemostasis. Patient tolerated the procedure well without complications.   09/14/23   Noreen Moulton MA

## 2023-09-21 ENCOUNTER — OFFICE VISIT (OUTPATIENT)
Dept: FAMILY MEDICINE CLINIC | Facility: CLINIC | Age: 68
End: 2023-09-21
Payer: MEDICARE

## 2023-09-21 VITALS
HEART RATE: 69 BPM | TEMPERATURE: 97.1 F | DIASTOLIC BLOOD PRESSURE: 82 MMHG | OXYGEN SATURATION: 94 % | HEIGHT: 66 IN | WEIGHT: 165.8 LBS | SYSTOLIC BLOOD PRESSURE: 128 MMHG | BODY MASS INDEX: 26.65 KG/M2

## 2023-09-21 DIAGNOSIS — E78.49 OTHER HYPERLIPIDEMIA: Chronic | ICD-10-CM

## 2023-09-21 DIAGNOSIS — M54.32 SCIATICA OF LEFT SIDE: ICD-10-CM

## 2023-09-21 DIAGNOSIS — I10 ESSENTIAL HYPERTENSION: Primary | ICD-10-CM

## 2023-09-21 DIAGNOSIS — M13.0 POLYARTHRITIS: ICD-10-CM

## 2023-09-21 DIAGNOSIS — L20.82 FLEXURAL ECZEMA: ICD-10-CM

## 2023-09-21 DIAGNOSIS — E03.4 HYPOTHYROIDISM DUE TO ACQUIRED ATROPHY OF THYROID: ICD-10-CM

## 2023-09-21 RX ORDER — TIZANIDINE 2 MG/1
2 TABLET ORAL NIGHTLY PRN
Qty: 30 TABLET | Refills: 2 | Status: SHIPPED | OUTPATIENT
Start: 2023-09-21

## 2023-09-21 RX ORDER — LOSARTAN POTASSIUM 100 MG/1
100 TABLET ORAL DAILY
Qty: 90 TABLET | Refills: 1 | Status: SHIPPED | OUTPATIENT
Start: 2023-09-21

## 2023-09-21 RX ORDER — CLOBETASOL PROPIONATE 0.5 MG/G
OINTMENT TOPICAL 2 TIMES DAILY PRN
Qty: 60 G | Refills: 11 | Status: SHIPPED | OUTPATIENT
Start: 2023-09-21

## 2023-09-21 RX ORDER — FUROSEMIDE 20 MG/1
20 TABLET ORAL DAILY
Qty: 90 TABLET | Refills: 1 | Status: SHIPPED | OUTPATIENT
Start: 2023-09-21

## 2023-09-21 RX ORDER — POTASSIUM CHLORIDE 750 MG/1
10 TABLET, EXTENDED RELEASE ORAL 2 TIMES DAILY
Qty: 180 TABLET | Refills: 1 | Status: SHIPPED | OUTPATIENT
Start: 2023-09-21

## 2023-09-21 RX ORDER — LEVOTHYROXINE SODIUM 0.05 MG/1
50 TABLET ORAL DAILY
Qty: 90 TABLET | Refills: 1 | Status: SHIPPED | OUTPATIENT
Start: 2023-09-21

## 2023-09-21 RX ORDER — ROSUVASTATIN CALCIUM 10 MG/1
10 TABLET, COATED ORAL DAILY
Qty: 90 TABLET | Refills: 1 | Status: SHIPPED | OUTPATIENT
Start: 2023-09-21

## 2023-09-21 NOTE — PROGRESS NOTES
Patient Name: Jocelyn Gee Today's Date: 2023   Patient MRN / CSN: [id number] / [id number] Date of Encounter: 2023   Patient Age / : 68 y.o. / 1955 Encounter Provider: Meenakshi Nunez DO   Referring Physician: No ref. provider found          Jocelyn is a 68 y.o. female who is being seen today for Hypertension, Hyperlipidemia, and Hypothyroidism      History of Present Illness    Jocelyn presents today for follow-up on hypertension, hyperlipidemia, and hypothyroidism.  She reports doing pretty well since last visit.  She denies any chest pain or shortness of air.  Her blood pressure is well controlled with the current regimen.  Her thyroid function test were normal on her latest labs.  Her LDL cholesterol had increased slightly, up to 104 from 73 previously.  She has been taking Crestor 10 mg daily and feels that she is tolerating it well.  She has had lumbar back pain with left hip pain worse at times lately.  She notices this pain mostly at night and reports some disruption in her sleep.  She reports a sciatica down the left side to her foot at times.  Diclofenac helps some with the symptoms.        Allergies include:Contrast dye (echo or unknown ct/mr)  Current Outpatient Medications   Medication Sig Dispense Refill    clobetasol (TEMOVATE) 0.05 % ointment Apply  topically to the appropriate area as directed 2 (Two) Times a Day As Needed (rash). 60 g 11    diclofenac (VOLTAREN) 50 MG EC tablet Take 1 tablet by mouth 2 (Two) Times a Day As Needed (pain). 180 tablet 1    furosemide (Lasix) 20 MG tablet Take 1 tablet by mouth Daily. 90 tablet 1    levothyroxine (SYNTHROID, LEVOTHROID) 50 MCG tablet Take 1 tablet by mouth Daily. 90 tablet 1    losartan (COZAAR) 100 MG tablet Take 1 tablet by mouth Daily. 90 tablet 1    potassium chloride (K-DUR,KLOR-CON) 10 MEQ CR tablet Take 1 tablet by mouth 2 (Two) Times a Day. 180 tablet 1    rosuvastatin (CRESTOR) 10 MG tablet Take 1 tablet by mouth Daily. 90  tablet 1    tiZANidine (ZANAFLEX) 2 MG tablet Take 1 tablet by mouth At Night As Needed (Back or hip pain). 30 tablet 2     No current facility-administered medications for this visit.     Past Medical History:   Diagnosis Date    Arthritis     Bronchitis     Disease of thyroid gland     Heart murmur     Hypertension     Mitral valve problem     Mitral valve prolapse      Family History   Problem Relation Age of Onset    Diabetes Mother     Heart disease Mother     Diabetes Father     Heart disease Father     Breast cancer Sister     Cancer Sister     Diabetes Maternal Grandfather     Kidney cancer Maternal Grandfather      Past Surgical History:   Procedure Laterality Date    CHOLECYSTECTOMY      CYSTOSCOPY RETROGRADE PYELOGRAM Bilateral 2/13/2023    Procedure: CYSTOSCOPY RETROGRADE PYELOGRAM;  Surgeon: Carlos Alberto Stovall MD;  Location: [facility];  Service: Urology;  Laterality: Bilateral;    HX OVARIAN CYSTECTOMY      HYSTERECTOMY      VAGINAL DELIVERY      Girl 1971; Boys- 1974, 1975,1969     Social History     Substance and Sexual Activity   Alcohol Use No     Social History     Tobacco Use   Smoking Status Every Day    Packs/day: 0.25    Years: 43.00    Pack years: 10.75    Types: Cigarettes    Start date: 1974   Smokeless Tobacco Never   Tobacco Comments    Few cigarettes per day     Social History     Substance and Sexual Activity   Drug Use No     Review of Systems   Constitutional:  Positive for fatigue.   Respiratory:          Patient reports shortness of air with exertion at times.    Cardiovascular:  Negative for chest pain.   Gastrointestinal:  Negative for abdominal pain and blood in stool.   Genitourinary:  Negative for hematuria.   Musculoskeletal:  Positive for arthralgias and back pain.      Depression Assessment Review:  PHQ-9 Total Score:    Vital Signs & Measurements Taken This Encounter  /82 (BP Location: Right arm, Patient Position: Sitting, Cuff Size: Adult)   Pulse 69   Temp  "97.1 °F (36.2 °C) (Temporal)   Ht 167.6 cm (65.98\")   Wt 75.2 kg (165 lb 12.8 oz)   SpO2 94%   BMI 26.77 kg/m²    SpO2 Percentage    09/21/23 0849   SpO2: 94%          Physical Exam  Vitals reviewed.   Constitutional:       General: She is not in acute distress.  HENT:      Head: Normocephalic and atraumatic.   Eyes:      General: No scleral icterus.     Extraocular Movements: Extraocular movements intact.      Conjunctiva/sclera: Conjunctivae normal.      Pupils: Pupils are equal, round, and reactive to light.   Cardiovascular:      Rate and Rhythm: Normal rate and regular rhythm.   Pulmonary:      Effort: Pulmonary effort is normal. No respiratory distress.      Breath sounds: Normal breath sounds.   Musculoskeletal:         General: No swelling.      Comments: 5/5 muscle strength demonstrated in the lower extremities bilaterally.     Skin:     General: Skin is warm and dry.      Coloration: Skin is not jaundiced.   Neurological:      Mental Status: She is alert.      Comments: Antalgic gait on the left. Patient has +2 patellar reflexes bilaterally.   Psychiatric:         Mood and Affect: Mood normal.         Behavior: Behavior normal.         Thought Content: Thought content normal.         Judgment: Judgment normal.            Assessment & Plan  Patient Active Problem List   Diagnosis    Abdominal pain    CN (constipation)    Disease of gallbladder    Billowing mitral valve    Hypothyroidism due to acquired atrophy of thyroid    Other hyperlipidemia    Essential hypertension    Other atopic dermatitis    Pulmonary nodules    Hydronephrosis with ureteropelvic junction (UPJ) obstruction    Encounter for wellness examination    Health care maintenance    Flexural eczema    Postmenopausal    Encounter for screening mammogram for malignant neoplasm of breast    Polyarthritis    Sciatica of left side       ICD-10-CM ICD-9-CM   1. Essential hypertension  I10 401.9   2. Other hyperlipidemia  E78.49 272.4   3. " Hypothyroidism due to acquired atrophy of thyroid  E03.4 244.8     246.8   4. Flexural eczema  L20.82 691.8   5. Polyarthritis  M13.0 716.50   6. Sciatica of left side  M54.32 724.3     Orders Placed This Encounter   Procedures    XR Hip With or Without Pelvis 2 - 3 View Left     Standing Status:   Future     Standing Expiration Date:   9/21/2024     Order Specific Question:   Reason for Exam:     Answer:   pain     Order Specific Question:   Release to patient     Answer:   Routine Release [[id number]]    XR Spine Lumbar 2 or 3 View     Standing Status:   Future     Standing Expiration Date:   9/21/2024     Order Specific Question:   Reason for Exam:     Answer:   sciatica     Order Specific Question:   Release to patient     Answer:   Routine Release [[id number]]    Comprehensive Metabolic Panel     Standing Status:   Future     Standing Expiration Date:   9/21/2024     Order Specific Question:   Release to patient     Answer:   Routine Release [[id number]]    Lipid Panel     Standing Status:   Future     Standing Expiration Date:   9/21/2024     Order Specific Question:   Release to patient     Answer:   Routine Release [[id number]]    TSH     Standing Status:   Future     Standing Expiration Date:   9/21/2024     Order Specific Question:   Release to patient     Answer:   Routine Release [[id number]]    CK     Standing Status:   Future     Standing Expiration Date:   9/21/2024     Order Specific Question:   Release to patient     Answer:   Routine Release [[id number]]    T4, Free     Standing Status:   Future     Standing Expiration Date:   9/21/2024     Order Specific Question:   Release to patient     Answer:   Routine Release [[id number]]       Meds Ordered During Visit:  New Medications Ordered This Visit   Medications    clobetasol (TEMOVATE) 0.05 % ointment     Sig: Apply  topically to the appropriate area as directed 2 (Two) Times a Day As Needed (rash).     Dispense:  60 g     Refill:  11    diclofenac  (VOLTAREN) 50 MG EC tablet     Sig: Take 1 tablet by mouth 2 (Two) Times a Day As Needed (pain).     Dispense:  180 tablet     Refill:  1    furosemide (Lasix) 20 MG tablet     Sig: Take 1 tablet by mouth Daily.     Dispense:  90 tablet     Refill:  1    levothyroxine (SYNTHROID, LEVOTHROID) 50 MCG tablet     Sig: Take 1 tablet by mouth Daily.     Dispense:  90 tablet     Refill:  1    losartan (COZAAR) 100 MG tablet     Sig: Take 1 tablet by mouth Daily.     Dispense:  90 tablet     Refill:  1    potassium chloride (K-DUR,KLOR-CON) 10 MEQ CR tablet     Sig: Take 1 tablet by mouth 2 (Two) Times a Day.     Dispense:  180 tablet     Refill:  1    rosuvastatin (CRESTOR) 10 MG tablet     Sig: Take 1 tablet by mouth Daily.     Dispense:  90 tablet     Refill:  1    tiZANidine (ZANAFLEX) 2 MG tablet     Sig: Take 1 tablet by mouth At Night As Needed (Back or hip pain).     Dispense:  30 tablet     Refill:  2       I reviewed labs with patient today.  We will continue current statin dose at this time.  I encouraged patient to avoid processed foods and we will plan to update labs for reevaluation just prior to next appointment.  Patient is scheduled to update her mammogram next month.  I encouraged her to update DEXA scan, lumbar spine and left hip x-rays at that time as well.  I discussed Zanaflex to take as needed at night for sciatica pain.  We will continue her other medicines as previously prescribed.  Updated refills today as requested.    Return in about 3 months (around 12/21/2023), or if symptoms worsen or fail to improve, for Recheck.          Referring Provider (if known): No ref. provider found      This document has been electronically signed by Meenakshi Nunez DO  September 21, 2023 09:34 EDT    Meenakshi Nunez DO, FACOI  [address] [place] [address] [phone] (office)    Part of this note may be an electronic transcription/translation of spoken language to printed text using the Dragon  Dictation System.

## 2023-09-27 ENCOUNTER — TELEPHONE (OUTPATIENT)
Dept: FAMILY MEDICINE CLINIC | Facility: CLINIC | Age: 68
End: 2023-09-27
Payer: MEDICARE

## 2023-09-27 NOTE — TELEPHONE ENCOUNTER
Caller: Gerard Jocelyn    Relationship: Self    Best call back number: [phone]    What medications are you currently taking:   Current Outpatient Medications on File Prior to Visit   Medication Sig Dispense Refill    clobetasol (TEMOVATE) 0.05 % ointment Apply  topically to the appropriate area as directed 2 (Two) Times a Day As Needed (rash). 60 g 11    diclofenac (VOLTAREN) 50 MG EC tablet Take 1 tablet by mouth 2 (Two) Times a Day As Needed (pain). 180 tablet 1    furosemide (Lasix) 20 MG tablet Take 1 tablet by mouth Daily. 90 tablet 1    levothyroxine (SYNTHROID, LEVOTHROID) 50 MCG tablet Take 1 tablet by mouth Daily. 90 tablet 1    losartan (COZAAR) 100 MG tablet Take 1 tablet by mouth Daily. 90 tablet 1    potassium chloride (K-DUR,KLOR-CON) 10 MEQ CR tablet Take 1 tablet by mouth 2 (Two) Times a Day. 180 tablet 1    rosuvastatin (CRESTOR) 10 MG tablet Take 1 tablet by mouth Daily. 90 tablet 1    tiZANidine (ZANAFLEX) 2 MG tablet Take 1 tablet by mouth At Night As Needed (Back or hip pain). 30 tablet 2     No current facility-administered medications on file prior to visit.        What are your concerns: CALLED STATED THAT THE RX  THAT PCP PRESCRIBED HER  FOR PAIN AT NIGHT WORKS WONDERFUL.  RX  tiZANidine (ZANAFLEX) 2 MG tablet

## 2023-10-06 ENCOUNTER — HOSPITAL ENCOUNTER (OUTPATIENT)
Dept: GENERAL RADIOLOGY | Facility: HOSPITAL | Age: 68
Discharge: HOME | End: 2023-10-06
Payer: MEDICARE

## 2023-10-06 ENCOUNTER — HOSPITAL ENCOUNTER (OUTPATIENT)
Dept: MAMMOGRAPHY | Facility: HOSPITAL | Age: 68
Discharge: HOME | End: 2023-10-06
Payer: MEDICARE

## 2023-10-06 ENCOUNTER — HOSPITAL ENCOUNTER (OUTPATIENT)
Dept: BONE DENSITY | Facility: HOSPITAL | Age: 68
Discharge: HOME | End: 2023-10-06
Payer: MEDICARE

## 2023-10-06 DIAGNOSIS — Z12.31 ENCOUNTER FOR SCREENING MAMMOGRAM FOR MALIGNANT NEOPLASM OF BREAST: ICD-10-CM

## 2023-10-06 DIAGNOSIS — M13.0 POLYARTHRITIS: ICD-10-CM

## 2023-10-06 DIAGNOSIS — Z78.0 POSTMENOPAUSAL: ICD-10-CM

## 2023-10-06 DIAGNOSIS — M54.32 SCIATICA OF LEFT SIDE: ICD-10-CM

## 2023-10-06 PROCEDURE — 77067 SCR MAMMO BI INCL CAD: CPT

## 2023-10-06 PROCEDURE — 77063 BREAST TOMOSYNTHESIS BI: CPT

## 2023-10-06 PROCEDURE — 72100 X-RAY EXAM L-S SPINE 2/3 VWS: CPT

## 2023-10-06 PROCEDURE — 77080 DXA BONE DENSITY AXIAL: CPT

## 2023-10-06 PROCEDURE — 73502 X-RAY EXAM HIP UNI 2-3 VIEWS: CPT

## 2023-12-15 ENCOUNTER — CLINICAL SUPPORT (OUTPATIENT)
Dept: FAMILY MEDICINE CLINIC | Facility: CLINIC | Age: 68
End: 2023-12-15
Payer: MEDICARE

## 2023-12-15 DIAGNOSIS — E78.49 OTHER HYPERLIPIDEMIA: Chronic | ICD-10-CM

## 2023-12-15 DIAGNOSIS — E03.4 HYPOTHYROIDISM DUE TO ACQUIRED ATROPHY OF THYROID: ICD-10-CM

## 2023-12-15 LAB
ALBUMIN SERPL-MCNC: 4.3 G/DL (ref 3.5–5.2)
ALBUMIN/GLOB SERPL: 1.5 G/DL
ALP SERPL-CCNC: 90 U/L (ref 39–117)
ALT SERPL W P-5'-P-CCNC: 14 U/L (ref 1–33)
ANION GAP SERPL CALCULATED.3IONS-SCNC: 12.6 MMOL/L (ref 5–15)
AST SERPL-CCNC: 17 U/L (ref 1–32)
BILIRUB SERPL-MCNC: 0.3 MG/DL (ref 0–1.2)
BUN SERPL-MCNC: 8 MG/DL (ref 8–23)
BUN/CREAT SERPL: 8.6 (ref 7–25)
CALCIUM SPEC-SCNC: 9.6 MG/DL (ref 8.6–10.5)
CHLORIDE SERPL-SCNC: 98 MMOL/L (ref 98–107)
CHOLEST SERPL-MCNC: 125 MG/DL (ref 0–200)
CK SERPL-CCNC: 86 U/L (ref 20–180)
CO2 SERPL-SCNC: 25.4 MMOL/L (ref 22–29)
CREAT SERPL-MCNC: 0.93 MG/DL (ref 0.57–1)
EGFRCR SERPLBLD CKD-EPI 2021: 66.7 ML/MIN/1.73
GLOBULIN UR ELPH-MCNC: 2.9 GM/DL
GLUCOSE SERPL-MCNC: 104 MG/DL (ref 65–99)
HDLC SERPL-MCNC: 50 MG/DL (ref 40–60)
LDLC SERPL CALC-MCNC: 59 MG/DL (ref 0–100)
LDLC/HDLC SERPL: 1.18 {RATIO}
POTASSIUM SERPL-SCNC: 4.3 MMOL/L (ref 3.5–5.2)
PROT SERPL-MCNC: 7.2 G/DL (ref 6–8.5)
SODIUM SERPL-SCNC: 136 MMOL/L (ref 136–145)
T4 FREE SERPL-MCNC: 1.93 NG/DL (ref 0.93–1.7)
TRIGL SERPL-MCNC: 80 MG/DL (ref 0–150)
TSH SERPL DL<=0.05 MIU/L-ACNC: 3.25 UIU/ML (ref 0.27–4.2)
VLDLC SERPL-MCNC: 16 MG/DL (ref 5–40)

## 2023-12-15 PROCEDURE — 80053 COMPREHEN METABOLIC PANEL: CPT | Performed by: INTERNAL MEDICINE

## 2023-12-15 PROCEDURE — 80061 LIPID PANEL: CPT | Performed by: INTERNAL MEDICINE

## 2023-12-15 PROCEDURE — 84443 ASSAY THYROID STIM HORMONE: CPT | Performed by: INTERNAL MEDICINE

## 2023-12-15 PROCEDURE — 36415 COLL VENOUS BLD VENIPUNCTURE: CPT | Performed by: NURSE PRACTITIONER

## 2023-12-15 PROCEDURE — 82550 ASSAY OF CK (CPK): CPT | Performed by: INTERNAL MEDICINE

## 2023-12-15 PROCEDURE — 84439 ASSAY OF FREE THYROXINE: CPT | Performed by: INTERNAL MEDICINE

## 2023-12-15 NOTE — PROGRESS NOTES
Venipuncture Blood Specimen Collection  Venipuncture performed in right arm by Noreen Moulton MA with good hemostasis. Patient tolerated the procedure well without complications.   12/15/23   Noreen Moulton MA

## 2023-12-21 ENCOUNTER — OFFICE VISIT (OUTPATIENT)
Dept: FAMILY MEDICINE CLINIC | Facility: CLINIC | Age: 68
End: 2023-12-21
Payer: MEDICARE

## 2023-12-21 VITALS
WEIGHT: 160.8 LBS | TEMPERATURE: 97.9 F | SYSTOLIC BLOOD PRESSURE: 126 MMHG | HEIGHT: 66 IN | OXYGEN SATURATION: 97 % | DIASTOLIC BLOOD PRESSURE: 84 MMHG | BODY MASS INDEX: 25.84 KG/M2 | HEART RATE: 77 BPM

## 2023-12-21 DIAGNOSIS — M54.32 SCIATICA OF LEFT SIDE: ICD-10-CM

## 2023-12-21 DIAGNOSIS — L20.82 FLEXURAL ECZEMA: ICD-10-CM

## 2023-12-21 DIAGNOSIS — E78.49 OTHER HYPERLIPIDEMIA: Chronic | ICD-10-CM

## 2023-12-21 DIAGNOSIS — Z23 FLU VACCINE NEED: ICD-10-CM

## 2023-12-21 DIAGNOSIS — M13.0 POLYARTHRITIS: ICD-10-CM

## 2023-12-21 DIAGNOSIS — E03.4 HYPOTHYROIDISM DUE TO ACQUIRED ATROPHY OF THYROID: ICD-10-CM

## 2023-12-21 DIAGNOSIS — I10 ESSENTIAL HYPERTENSION: Primary | ICD-10-CM

## 2023-12-21 RX ORDER — ROSUVASTATIN CALCIUM 10 MG/1
10 TABLET, COATED ORAL DAILY
Qty: 90 TABLET | Refills: 1 | Status: SHIPPED | OUTPATIENT
Start: 2023-12-21

## 2023-12-21 RX ORDER — TIZANIDINE 2 MG/1
2 TABLET ORAL NIGHTLY PRN
Qty: 90 TABLET | Refills: 1 | Status: SHIPPED | OUTPATIENT
Start: 2023-12-21

## 2023-12-21 RX ORDER — LOSARTAN POTASSIUM 100 MG/1
100 TABLET ORAL DAILY
Qty: 90 TABLET | Refills: 1 | Status: SHIPPED | OUTPATIENT
Start: 2023-12-21

## 2023-12-21 RX ORDER — CLOBETASOL PROPIONATE 0.5 MG/G
OINTMENT TOPICAL 2 TIMES DAILY PRN
Qty: 60 G | Refills: 11 | Status: SHIPPED | OUTPATIENT
Start: 2023-12-21

## 2023-12-21 RX ORDER — FUROSEMIDE 20 MG/1
20 TABLET ORAL DAILY
Qty: 90 TABLET | Refills: 1 | Status: SHIPPED | OUTPATIENT
Start: 2023-12-21

## 2023-12-21 RX ORDER — POTASSIUM CHLORIDE 750 MG/1
10 TABLET, EXTENDED RELEASE ORAL 2 TIMES DAILY
Qty: 180 TABLET | Refills: 1 | Status: SHIPPED | OUTPATIENT
Start: 2023-12-21

## 2023-12-21 RX ORDER — LEVOTHYROXINE SODIUM 0.03 MG/1
25 TABLET ORAL DAILY
Qty: 90 TABLET | Refills: 1 | Status: SHIPPED | OUTPATIENT
Start: 2023-12-21

## 2023-12-21 NOTE — PROGRESS NOTES
Immunization  Immunization performed in Left Deltoid by Merle Clancy MA. Patient tolerated the procedure well without complications.  12/21/23   Merle Clancy MA

## 2023-12-21 NOTE — PROGRESS NOTES
Patient Name: Jocelyn Gee Today's Date: 2023   Patient MRN / CSN: [id number] / [id number] Date of Encounter: 2023   Patient Age / : 68 y.o. / 1955 Encounter Provider: Meenakshi Nunez DO   Referring Physician: No ref. provider found          Jocelyn is a 68 y.o. female who is being seen today for Hypertension and Hypothyroidism      Hypertension  This is a chronic problem. The current episode started more than 1 year ago. The problem is controlled. Pertinent negatives include no chest pain or shortness of breath. Current antihypertension treatment includes diuretics and angiotensin blockers. The current treatment provides significant improvement. There are no compliance problems.    Hypothyroidism  This is a chronic problem. The current episode started more than 1 month ago. Pertinent negatives include no abdominal pain, chest pain or fatigue. Associated symptoms comments: Free T4 had increased, up to 1.93 on the latest labs.  Patient denies any biotin supplements.  She has been taking levothyroxine regularly at 50 mcg/day.       Allergies include:Contrast dye (echo or unknown ct/mr)  Current Outpatient Medications   Medication Sig Dispense Refill    clobetasol (TEMOVATE) 0.05 % ointment Apply  topically to the appropriate area as directed 2 (Two) Times a Day As Needed (rash). 60 g 11    diclofenac (VOLTAREN) 50 MG EC tablet Take 1 tablet by mouth 2 (Two) Times a Day As Needed (pain). 180 tablet 1    furosemide (Lasix) 20 MG tablet Take 1 tablet by mouth Daily. 90 tablet 1    levothyroxine (SYNTHROID, LEVOTHROID) 25 MCG tablet Take 1 tablet by mouth Daily. 90 tablet 1    losartan (COZAAR) 100 MG tablet Take 1 tablet by mouth Daily. 90 tablet 1    potassium chloride (K-DUR,KLOR-CON) 10 MEQ CR tablet Take 1 tablet by mouth 2 (Two) Times a Day. 180 tablet 1    rosuvastatin (CRESTOR) 10 MG tablet Take 1 tablet by mouth Daily. 90 tablet 1    tiZANidine (ZANAFLEX) 2 MG tablet Take 1 tablet by mouth At  "Night As Needed for Muscle Spasms (Back or hip pain). 90 tablet 1     No current facility-administered medications for this visit.     Past Medical History:   Diagnosis Date    Arthritis     Bronchitis     Disease of thyroid gland     Heart murmur     Hypertension     Mitral valve problem     Mitral valve prolapse     Other hyperlipidemia 01/29/2018     Family History   Problem Relation Age of Onset    Diabetes Mother     Heart disease Mother     Diabetes Father     Heart disease Father     Breast cancer Sister 60    Cancer Sister     Diabetes Maternal Grandfather     Kidney cancer Maternal Grandfather      Past Surgical History:   Procedure Laterality Date    CHOLECYSTECTOMY      CYSTOSCOPY RETROGRADE PYELOGRAM Bilateral 02/13/2023    Procedure: CYSTOSCOPY RETROGRADE PYELOGRAM;  Surgeon: Carlos Alberto Stovall MD;  Location: [facility];  Service: Urology;  Laterality: Bilateral;    HX OVARIAN CYSTECTOMY      HYSTERECTOMY  1979    benign    VAGINAL DELIVERY      Girl 1971; Boys- 1974, 1975,1969     Social History     Substance and Sexual Activity   Alcohol Use No     Social History     Tobacco Use   Smoking Status Every Day    Packs/day: 0.25    Years: 43.00    Additional pack years: 0.00    Total pack years: 10.75    Types: Cigarettes    Start date: 1974   Smokeless Tobacco Never   Tobacco Comments    Few cigarettes per day     Social History     Substance and Sexual Activity   Drug Use No     Review of Systems   Constitutional:  Negative for fatigue.   Respiratory:  Negative for shortness of breath.    Cardiovascular:  Negative for chest pain.   Gastrointestinal:  Negative for abdominal pain and blood in stool.   Genitourinary:  Negative for hematuria.        Depression Assessment Review:  PHQ-9 Total Score:    Vital Signs & Measurements Taken This Encounter  /84 (BP Location: Left arm, Patient Position: Sitting, Cuff Size: Adult)   Pulse 77   Temp 97.9 °F (36.6 °C) (Temporal)   Ht 167.6 cm (65.98\")   Wt " 72.9 kg (160 lb 12.8 oz)   SpO2 97%   BMI 25.97 kg/m²    SpO2 Percentage    12/21/23 0917   SpO2: 97%               Physical Exam  Vitals reviewed.   Constitutional:       General: She is not in acute distress.  HENT:      Head: Normocephalic and atraumatic.   Eyes:      General: No scleral icterus.     Extraocular Movements: Extraocular movements intact.      Conjunctiva/sclera: Conjunctivae normal.      Pupils: Pupils are equal, round, and reactive to light.   Cardiovascular:      Rate and Rhythm: Normal rate and regular rhythm.   Pulmonary:      Effort: Pulmonary effort is normal. No respiratory distress.      Breath sounds: Normal breath sounds. No wheezing or rhonchi.   Musculoskeletal:         General: No swelling.      Cervical back: Neck supple. No tenderness.   Lymphadenopathy:      Cervical: No cervical adenopathy.   Skin:     General: Skin is warm and dry.      Coloration: Skin is not jaundiced.   Neurological:      Mental Status: She is alert.   Psychiatric:         Mood and Affect: Mood normal.         Behavior: Behavior normal.         Thought Content: Thought content normal.         Judgment: Judgment normal.              Assessment & Plan  Patient Active Problem List   Diagnosis    Abdominal pain    Constipation    Disease of gallbladder    Billowing mitral valve    Hypothyroidism due to acquired atrophy of thyroid    Other hyperlipidemia    Essential hypertension    Other atopic dermatitis    Pulmonary nodules    Hydronephrosis with ureteropelvic junction (UPJ) obstruction    Encounter for wellness examination    Health care maintenance    Flexural eczema    Postmenopausal    Encounter for screening mammogram for malignant neoplasm of breast    Polyarthritis    Sciatica of left side       ICD-10-CM ICD-9-CM   1. Essential hypertension  I10 401.9   2. Hypothyroidism due to acquired atrophy of thyroid  E03.4 244.8     246.8   3. Other hyperlipidemia  E78.49 272.4   4. Flexural eczema  L20.82 691.8    5. Polyarthritis  M13.0 716.50   6. Sciatica of left side  M54.32 724.3   7. Flu vaccine need  Z23 V04.81     Orders Placed This Encounter   Procedures    Fluzone High-Dose 65+yrs ([id number])    T4, Free     Standing Status:   Future     Standing Expiration Date:   12/21/2024     Order Specific Question:   Release to patient     Answer:   Routine Release [[id number]]    TSH     Standing Status:   Future     Standing Expiration Date:   12/21/2024     Order Specific Question:   Release to patient     Answer:   Routine Release [[id number]]       Meds Ordered During Visit:  New Medications Ordered This Visit   Medications    clobetasol (TEMOVATE) 0.05 % ointment     Sig: Apply  topically to the appropriate area as directed 2 (Two) Times a Day As Needed (rash).     Dispense:  60 g     Refill:  11    diclofenac (VOLTAREN) 50 MG EC tablet     Sig: Take 1 tablet by mouth 2 (Two) Times a Day As Needed (pain).     Dispense:  180 tablet     Refill:  1    furosemide (Lasix) 20 MG tablet     Sig: Take 1 tablet by mouth Daily.     Dispense:  90 tablet     Refill:  1    levothyroxine (SYNTHROID, LEVOTHROID) 25 MCG tablet     Sig: Take 1 tablet by mouth Daily.     Dispense:  90 tablet     Refill:  1    losartan (COZAAR) 100 MG tablet     Sig: Take 1 tablet by mouth Daily.     Dispense:  90 tablet     Refill:  1    potassium chloride (K-DUR,KLOR-CON) 10 MEQ CR tablet     Sig: Take 1 tablet by mouth 2 (Two) Times a Day.     Dispense:  180 tablet     Refill:  1    rosuvastatin (CRESTOR) 10 MG tablet     Sig: Take 1 tablet by mouth Daily.     Dispense:  90 tablet     Refill:  1    tiZANidine (ZANAFLEX) 2 MG tablet     Sig: Take 1 tablet by mouth At Night As Needed for Muscle Spasms (Back or hip pain).     Dispense:  90 tablet     Refill:  1     I reviewed labs in detail with patient today.  I recommended decreasing levothyroxine to 25 mcg daily.  I would like for her to repeat TSH with free T4 in 4 to 6 weeks.  We will continue other  medicines as previously prescribed at this time.  Updated refills today as requested.  Flu shot was also updated today.      Return in about 3 months (around 3/21/2024), or if symptoms worsen or fail to improve.          Referring Provider (if known): No ref. provider found      This document has been electronically signed by Meenakshi Nunez DO  December 21, 2023 10:53 EST    Meenakshi Nunez DO, FACOI  [address] [place] [address]  [phone] (office)    Part of this note may be an electronic transcription/translation of spoken language to printed text using the Dragon Dictation System.

## 2024-02-06 ENCOUNTER — CLINICAL SUPPORT (OUTPATIENT)
Dept: FAMILY MEDICINE CLINIC | Facility: CLINIC | Age: 69
End: 2024-02-06
Payer: MEDICARE

## 2024-02-06 DIAGNOSIS — E78.49 OTHER HYPERLIPIDEMIA: Primary | Chronic | ICD-10-CM

## 2024-02-06 DIAGNOSIS — E03.4 HYPOTHYROIDISM DUE TO ACQUIRED ATROPHY OF THYROID: Chronic | ICD-10-CM

## 2024-02-06 DIAGNOSIS — I10 ESSENTIAL HYPERTENSION: Chronic | ICD-10-CM

## 2024-02-06 DIAGNOSIS — E03.4 HYPOTHYROIDISM DUE TO ACQUIRED ATROPHY OF THYROID: ICD-10-CM

## 2024-02-06 LAB
T4 FREE SERPL-MCNC: 1.33 NG/DL (ref 0.93–1.7)
TSH SERPL DL<=0.05 MIU/L-ACNC: 8.19 UIU/ML (ref 0.27–4.2)

## 2024-02-06 PROCEDURE — 84443 ASSAY THYROID STIM HORMONE: CPT | Performed by: INTERNAL MEDICINE

## 2024-02-06 PROCEDURE — 84439 ASSAY OF FREE THYROXINE: CPT | Performed by: INTERNAL MEDICINE

## 2024-02-06 PROCEDURE — 36415 COLL VENOUS BLD VENIPUNCTURE: CPT | Performed by: INTERNAL MEDICINE

## 2024-02-06 RX ORDER — LEVOTHYROXINE SODIUM 0.03 MG/1
TABLET ORAL
Qty: 114 TABLET | Refills: 1 | Status: SHIPPED | OUTPATIENT
Start: 2024-02-06

## 2024-02-06 NOTE — PROGRESS NOTES
Venipuncture Blood Specimen Collection  Venipuncture performed in Left arm by Merle Clancy MA with good hemostasis. Patient tolerated the procedure well without complications.   02/06/24   Merle Clancy MA

## 2024-03-19 ENCOUNTER — CLINICAL SUPPORT (OUTPATIENT)
Dept: FAMILY MEDICINE CLINIC | Facility: CLINIC | Age: 69
End: 2024-03-19
Payer: MEDICARE

## 2024-03-19 DIAGNOSIS — I10 ESSENTIAL HYPERTENSION: Chronic | ICD-10-CM

## 2024-03-19 DIAGNOSIS — E78.49 OTHER HYPERLIPIDEMIA: Chronic | ICD-10-CM

## 2024-03-19 DIAGNOSIS — E03.4 HYPOTHYROIDISM DUE TO ACQUIRED ATROPHY OF THYROID: Chronic | ICD-10-CM

## 2024-03-19 PROCEDURE — 36415 COLL VENOUS BLD VENIPUNCTURE: CPT | Performed by: INTERNAL MEDICINE

## 2024-03-19 PROCEDURE — 80061 LIPID PANEL: CPT | Performed by: INTERNAL MEDICINE

## 2024-03-19 PROCEDURE — 80053 COMPREHEN METABOLIC PANEL: CPT | Performed by: INTERNAL MEDICINE

## 2024-03-19 PROCEDURE — 84439 ASSAY OF FREE THYROXINE: CPT | Performed by: INTERNAL MEDICINE

## 2024-03-19 PROCEDURE — 84443 ASSAY THYROID STIM HORMONE: CPT | Performed by: INTERNAL MEDICINE

## 2024-03-19 PROCEDURE — 82550 ASSAY OF CK (CPK): CPT | Performed by: INTERNAL MEDICINE

## 2024-03-19 NOTE — PROGRESS NOTES
Venipuncture Blood Specimen Collection  Venipuncture performed in right arm by Noreen Moulton MA with good hemostasis. Patient tolerated the procedure well without complications.   03/19/24   Noreen Moulton MA

## 2024-03-20 LAB
ALBUMIN SERPL-MCNC: 4.5 G/DL (ref 3.5–5.2)
ALBUMIN/GLOB SERPL: 1.9 G/DL
ALP SERPL-CCNC: 96 U/L (ref 39–117)
ALT SERPL W P-5'-P-CCNC: 22 U/L (ref 1–33)
ANION GAP SERPL CALCULATED.3IONS-SCNC: 11 MMOL/L (ref 5–15)
AST SERPL-CCNC: 16 U/L (ref 1–32)
BILIRUB SERPL-MCNC: 0.4 MG/DL (ref 0–1.2)
BUN SERPL-MCNC: 13 MG/DL (ref 8–23)
BUN/CREAT SERPL: 12.3 (ref 7–25)
CALCIUM SPEC-SCNC: 9 MG/DL (ref 8.6–10.5)
CHLORIDE SERPL-SCNC: 100 MMOL/L (ref 98–107)
CHOLEST SERPL-MCNC: 144 MG/DL (ref 0–200)
CK SERPL-CCNC: 72 U/L (ref 20–180)
CO2 SERPL-SCNC: 27 MMOL/L (ref 22–29)
CREAT SERPL-MCNC: 1.06 MG/DL (ref 0.57–1)
EGFRCR SERPLBLD CKD-EPI 2021: 57 ML/MIN/1.73
GLOBULIN UR ELPH-MCNC: 2.4 GM/DL
GLUCOSE SERPL-MCNC: 98 MG/DL (ref 65–99)
HDLC SERPL-MCNC: 44 MG/DL (ref 40–60)
LDLC SERPL CALC-MCNC: 76 MG/DL (ref 0–100)
LDLC/HDLC SERPL: 1.65 {RATIO}
POTASSIUM SERPL-SCNC: 4.2 MMOL/L (ref 3.5–5.2)
PROT SERPL-MCNC: 6.9 G/DL (ref 6–8.5)
SODIUM SERPL-SCNC: 138 MMOL/L (ref 136–145)
T4 FREE SERPL-MCNC: 1.42 NG/DL (ref 0.93–1.7)
TRIGL SERPL-MCNC: 138 MG/DL (ref 0–150)
TSH SERPL DL<=0.05 MIU/L-ACNC: 6.82 UIU/ML (ref 0.27–4.2)
VLDLC SERPL-MCNC: 24 MG/DL (ref 5–40)

## 2024-03-25 ENCOUNTER — OFFICE VISIT (OUTPATIENT)
Dept: FAMILY MEDICINE CLINIC | Facility: CLINIC | Age: 69
End: 2024-03-25
Payer: MEDICARE

## 2024-03-25 VITALS
WEIGHT: 161.8 LBS | HEIGHT: 65 IN | OXYGEN SATURATION: 94 % | SYSTOLIC BLOOD PRESSURE: 118 MMHG | HEART RATE: 70 BPM | DIASTOLIC BLOOD PRESSURE: 82 MMHG | TEMPERATURE: 97.3 F | BODY MASS INDEX: 26.96 KG/M2

## 2024-03-25 DIAGNOSIS — Z00.00 ENCOUNTER FOR WELLNESS EXAMINATION: Primary | ICD-10-CM

## 2024-03-25 DIAGNOSIS — Z00.00 HEALTH CARE MAINTENANCE: ICD-10-CM

## 2024-03-25 DIAGNOSIS — I10 ESSENTIAL HYPERTENSION: Chronic | ICD-10-CM

## 2024-03-25 DIAGNOSIS — E03.4 HYPOTHYROIDISM DUE TO ACQUIRED ATROPHY OF THYROID: Chronic | ICD-10-CM

## 2024-03-25 DIAGNOSIS — E78.49 OTHER HYPERLIPIDEMIA: Chronic | ICD-10-CM

## 2024-03-25 NOTE — PROGRESS NOTES
The ABCs of the Annual Wellness Visit  Subsequent Medicare Wellness Visit    Subjective    Jocelyn Gee is a 68 y.o. female who presents for a Subsequent Medicare Wellness Visit.    The following portions of the patient's history were reviewed and   updated as appropriate: allergies, current medications, past family history, past medical history, past social history, past surgical history, and problem list.    Compared to one year ago, the patient feels her physical   health is worse due to back pain.     Compared to one year ago, the patient feels her mental   health is the same.    Recent Hospitalizations:  She was not admitted to the hospital during the last year.       Current Medical Providers:  Patient Care Team:  Meenakshi Nunez DO as PCP - General (Family Medicine)    Outpatient Medications Prior to Visit   Medication Sig Dispense Refill    clobetasol (TEMOVATE) 0.05 % ointment Apply  topically to the appropriate area as directed 2 (Two) Times a Day As Needed (rash). 60 g 11    diclofenac (VOLTAREN) 50 MG EC tablet Take 1 tablet by mouth 2 (Two) Times a Day As Needed (pain). 180 tablet 1    furosemide (Lasix) 20 MG tablet Take 1 tablet by mouth Daily. 90 tablet 1    levothyroxine (SYNTHROID, LEVOTHROID) 25 MCG tablet Take 2 tablets (50 mcg) on Mondays and Thursdays.  Take 1 tablet daily rest of week. 114 tablet 1    losartan (COZAAR) 100 MG tablet Take 1 tablet by mouth Daily. 90 tablet 1    potassium chloride (K-DUR,KLOR-CON) 10 MEQ CR tablet Take 1 tablet by mouth 2 (Two) Times a Day. 180 tablet 1    rosuvastatin (CRESTOR) 10 MG tablet Take 1 tablet by mouth Daily. 90 tablet 1    tiZANidine (ZANAFLEX) 2 MG tablet Take 1 tablet by mouth At Night As Needed for Muscle Spasms (Back or hip pain). 90 tablet 1     No facility-administered medications prior to visit.       No opioid medication identified on active medication list. I have reviewed chart for other potential  high risk medication/s and harmful drug  "interactions in the elderly.        Aspirin is not on active medication list.  Aspirin use is not indicated based on review of current medical condition/s. Risk of harm outweighs potential benefits.      Patient Active Problem List   Diagnosis    Abdominal pain    Constipation    Disease of gallbladder    Billowing mitral valve    Hypothyroidism due to acquired atrophy of thyroid    Other hyperlipidemia    Essential hypertension    Other atopic dermatitis    Pulmonary nodules    Hydronephrosis with ureteropelvic junction (UPJ) obstruction    Encounter for wellness examination    Health care maintenance    Flexural eczema    Postmenopausal    Encounter for screening mammogram for malignant neoplasm of breast    Polyarthritis    Sciatica of left side     Advance Care Planning   Advance Care Planning     Advance Directive is not on file.  ACP discussion was held with the patient during this visit. Patient does not have an advance directive, information provided.     Objective    Vitals:    03/25/24 1053   BP: 118/82   BP Location: Left arm   Patient Position: Sitting   Cuff Size: Adult   Pulse: 70   Temp: 97.3 °F (36.3 °C)   TempSrc: Temporal   SpO2: 94%   Weight: 73.4 kg (161 lb 12.8 oz)   Height: 165.1 cm (65\")   PainSc: 0-No pain     Estimated body mass index is 26.92 kg/m² as calculated from the following:    Height as of this encounter: 165.1 cm (65\").    Weight as of this encounter: 73.4 kg (161 lb 12.8 oz).    BMI is >= 25 and <30. (Overweight) The following options were offered after discussion;: nutrition counseling/recommendations      Does the patient have evidence of cognitive impairment? No    Lab Results   Component Value Date    TRIG 138 03/19/2024    HDL 44 03/19/2024    LDL 76 03/19/2024    VLDL 24 03/19/2024        HEALTH RISK ASSESSMENT    Smoking Status:  Social History     Tobacco Use   Smoking Status Every Day    Current packs/day: 0.25    Average packs/day: 0.3 packs/day for 50.2 years (12.6 ttl " pk-yrs)    Types: Cigarettes    Start date: 1974   Smokeless Tobacco Never   Tobacco Comments    Few cigarettes per day     Alcohol Consumption:  Social History     Substance and Sexual Activity   Alcohol Use No     Fall Risk Screen:    ANA ROSA Fall Risk Assessment was completed, and patient is at LOW risk for falls.Assessment completed on:3/25/2024    Depression Screening:      3/25/2024    10:53 AM   PHQ-2/PHQ-9 Depression Screening   Little Interest or Pleasure in Doing Things 0-->not at all   Feeling Down, Depressed or Hopeless 0-->not at all   PHQ-9: Brief Depression Severity Measure Score 0       Health Habits and Functional and Cognitive Screening:      3/25/2024    10:49 AM   Functional & Cognitive Status   Do you have difficulty preparing food and eating? No   Do you have difficulty bathing yourself, getting dressed or grooming yourself? No   Do you have difficulty using the toilet? No   Do you have difficulty moving around from place to place? No   Do you have trouble with steps or getting out of a bed or a chair? No   Current Diet Unhealthy Diet   Dental Exam Not up to date   Eye Exam Not up to date   Exercise (times per week) 0 times per week   Current Exercises Include No Regular Exercise   Do you need help using the phone?  No   Are you deaf or do you have serious difficulty hearing?  No   Do you need help to go to places out of walking distance? No   Do you need help shopping? No   Do you need help preparing meals?  No   Do you need help with housework?  No   Do you need help with laundry? No   Do you need help taking your medications? No   Do you need help managing money? No   Do you ever drive or ride in a car without wearing a seat belt? No   Have you felt unusual stress, anger or loneliness in the last month? No   Who do you live with? Alone   If you need help, do you have trouble finding someone available to you? No   Have you been bothered in the last four weeks by sexual problems? No   Do you  have difficulty concentrating, remembering or making decisions? No       Age-appropriate Screening Schedule:  Refer to the list below for future screening recommendations based on patient's age, sex and/or medical conditions. Orders for these recommended tests are listed in the plan section. The patient has been provided with a written plan.    Health Maintenance   Topic Date Due    RSV Vaccine - Adults (1 - 1-dose 60+ series) Never done    ZOSTER VACCINE (3 of 3) 10/23/2017    COVID-19 Vaccine (4 - 2023-24 season) 09/01/2023    ANNUAL WELLNESS VISIT  03/21/2024    COLORECTAL CANCER SCREENING  09/01/2024 (Originally 1955)    LIPID PANEL  03/19/2025    BMI FOLLOWUP  03/25/2025    MAMMOGRAM  10/06/2025    DXA SCAN  10/06/2025    TDAP/TD VACCINES (2 - Td or Tdap) 07/12/2027    HEPATITIS C SCREENING  Completed    INFLUENZA VACCINE  Completed    Pneumococcal Vaccine 65+  Completed    LUNG CANCER SCREENING  Discontinued                  CMS Preventative Services Quick Reference  Risk Factors Identified During Encounter  Immunizations Discussed/Encouraged: Shingrix. I encouraged patient to update this at her pharmacy.     Age appropriate screenings were discussed with patient today. Jocelyn is utd on dexa, mammogram and cologuard.     The above risks/problems have been discussed with the patient.  Pertinent information has been shared with the patient in the After Visit Summary.  An After Visit Summary and PPPS were made available to the patient.    Follow Up:   Next Medicare Wellness visit to be scheduled in 1 year.       Additional E&M Note during same encounter follows:  Patient has multiple medical problems which are significant and separately identifiable that require additional work above and beyond the Medicare Wellness Visit.      Chief Complaint  Medicare Wellness-subsequent, Follow-up (States she is doing good, normal aging issues. ), and Hypothyroidism    Subjective          Jocelyn Gee is also being seen today  "for hypothyroidism, hypertension and hyperlipidemia. She reports feeling pretty well. She notes some joint pain at times. She denies chest pain. Her blood pressure is well controlled with the current medicine regimen. Her thyroid function tests have improved on the current levothyroxine dose. Cholesterol is well controlled with last ldl of 76 mg/dL.     HPI      Review of Systems   Respiratory:          Shortness of air at times with exertion   Cardiovascular:  Negative for chest pain.   Gastrointestinal:  Negative for blood in stool.   Genitourinary:  Negative for hematuria.   Musculoskeletal:  Positive for arthralgias and back pain.       Objective   Vital Signs:  /82 (BP Location: Left arm, Patient Position: Sitting, Cuff Size: Adult)   Pulse 70   Temp 97.3 °F (36.3 °C) (Temporal)   Ht 165.1 cm (65\")   Wt 73.4 kg (161 lb 12.8 oz)   SpO2 94%   BMI 26.92 kg/m²     Physical Exam  Vitals reviewed.   Constitutional:       General: She is not in acute distress.  HENT:      Head: Normocephalic and atraumatic.   Eyes:      General: No scleral icterus.     Extraocular Movements: Extraocular movements intact.      Conjunctiva/sclera: Conjunctivae normal.      Pupils: Pupils are equal, round, and reactive to light.   Cardiovascular:      Rate and Rhythm: Normal rate and regular rhythm.   Pulmonary:      Effort: Pulmonary effort is normal. No respiratory distress.      Breath sounds: Normal breath sounds. No wheezing or rhonchi.   Musculoskeletal:         General: No swelling.      Cervical back: Neck supple. No tenderness.   Lymphadenopathy:      Cervical: No cervical adenopathy.   Skin:     General: Skin is warm and dry.      Coloration: Skin is not jaundiced.   Neurological:      Mental Status: She is alert.   Psychiatric:         Mood and Affect: Mood normal.         Behavior: Behavior normal.         Thought Content: Thought content normal.         Judgment: Judgment normal.                 Assessment and " Plan   Diagnoses and all orders for this visit:    1. Encounter for wellness examination (Primary)    2. Health care maintenance    3. Hypothyroidism due to acquired atrophy of thyroid  -     T4, Free; Future  -     TSH; Future    4. Essential hypertension  -     CBC (No Diff); Future  -     Comprehensive Metabolic Panel; Future  -     Lipid Panel; Future  -     T4, Free; Future  -     TSH; Future  -     CK; Future    5. Other hyperlipidemia  -     Comprehensive Metabolic Panel; Future  -     Lipid Panel; Future  -     CK; Future    Clinically, Jocelyn appears to be doing well. We will continue current regimen. I reviewed recent labs with patient today. We will plan to repeat these labs as above prior to next appt.          Follow Up   Return in about 3 months (around 6/25/2024), or if symptoms worsen or fail to improve, for Recheck.  Patient was given instructions and counseling regarding her condition or for health maintenance advice. Please see specific information pulled into the AVS if appropriate.       This document has been electronically signed by Meenakshi Nunez DO  March 26, 2024 13:14 EDT

## 2024-06-21 ENCOUNTER — CLINICAL SUPPORT (OUTPATIENT)
Dept: FAMILY MEDICINE CLINIC | Facility: CLINIC | Age: 69
End: 2024-06-21
Payer: MEDICARE

## 2024-06-21 DIAGNOSIS — E78.49 OTHER HYPERLIPIDEMIA: Chronic | ICD-10-CM

## 2024-06-21 DIAGNOSIS — E03.4 HYPOTHYROIDISM DUE TO ACQUIRED ATROPHY OF THYROID: Chronic | ICD-10-CM

## 2024-06-21 DIAGNOSIS — I10 ESSENTIAL HYPERTENSION: Chronic | ICD-10-CM

## 2024-06-21 LAB
ALBUMIN SERPL-MCNC: 4.5 G/DL (ref 3.5–5.2)
ALBUMIN/GLOB SERPL: 1.6 G/DL
ALP SERPL-CCNC: 87 U/L (ref 39–117)
ALT SERPL W P-5'-P-CCNC: 20 U/L (ref 1–33)
ANION GAP SERPL CALCULATED.3IONS-SCNC: 10 MMOL/L (ref 5–15)
AST SERPL-CCNC: 20 U/L (ref 1–32)
BILIRUB SERPL-MCNC: 0.4 MG/DL (ref 0–1.2)
BUN SERPL-MCNC: 12 MG/DL (ref 8–23)
BUN/CREAT SERPL: 15.2 (ref 7–25)
CALCIUM SPEC-SCNC: 9.5 MG/DL (ref 8.6–10.5)
CHLORIDE SERPL-SCNC: 100 MMOL/L (ref 98–107)
CHOLEST SERPL-MCNC: 145 MG/DL (ref 0–200)
CK SERPL-CCNC: 77 U/L (ref 20–180)
CO2 SERPL-SCNC: 24 MMOL/L (ref 22–29)
CREAT SERPL-MCNC: 0.79 MG/DL (ref 0.57–1)
DEPRECATED RDW RBC AUTO: 42.6 FL (ref 37–54)
EGFRCR SERPLBLD CKD-EPI 2021: 81.1 ML/MIN/1.73
ERYTHROCYTE [DISTWIDTH] IN BLOOD BY AUTOMATED COUNT: 12.6 % (ref 12.3–15.4)
GLOBULIN UR ELPH-MCNC: 2.9 GM/DL
GLUCOSE SERPL-MCNC: 88 MG/DL (ref 65–99)
HCT VFR BLD AUTO: 47.1 % (ref 34–46.6)
HDLC SERPL-MCNC: 51 MG/DL (ref 40–60)
HGB BLD-MCNC: 16.1 G/DL (ref 12–15.9)
LDLC SERPL CALC-MCNC: 74 MG/DL (ref 0–100)
LDLC/HDLC SERPL: 1.4 {RATIO}
MCH RBC QN AUTO: 31.4 PG (ref 26.6–33)
MCHC RBC AUTO-ENTMCNC: 34.2 G/DL (ref 31.5–35.7)
MCV RBC AUTO: 92 FL (ref 79–97)
PLATELET # BLD AUTO: 234 10*3/MM3 (ref 140–450)
PMV BLD AUTO: 10.6 FL (ref 6–12)
POTASSIUM SERPL-SCNC: 4.6 MMOL/L (ref 3.5–5.2)
PROT SERPL-MCNC: 7.4 G/DL (ref 6–8.5)
RBC # BLD AUTO: 5.12 10*6/MM3 (ref 3.77–5.28)
SODIUM SERPL-SCNC: 134 MMOL/L (ref 136–145)
T4 FREE SERPL-MCNC: 1.42 NG/DL (ref 0.92–1.68)
TRIGL SERPL-MCNC: 113 MG/DL (ref 0–150)
TSH SERPL DL<=0.05 MIU/L-ACNC: 6.69 UIU/ML (ref 0.27–4.2)
VLDLC SERPL-MCNC: 20 MG/DL (ref 5–40)
WBC NRBC COR # BLD AUTO: 4.17 10*3/MM3 (ref 3.4–10.8)

## 2024-06-21 PROCEDURE — 36415 COLL VENOUS BLD VENIPUNCTURE: CPT | Performed by: INTERNAL MEDICINE

## 2024-06-21 PROCEDURE — 85027 COMPLETE CBC AUTOMATED: CPT | Performed by: INTERNAL MEDICINE

## 2024-06-21 PROCEDURE — 80061 LIPID PANEL: CPT | Performed by: INTERNAL MEDICINE

## 2024-06-21 PROCEDURE — 84443 ASSAY THYROID STIM HORMONE: CPT | Performed by: INTERNAL MEDICINE

## 2024-06-21 PROCEDURE — 84439 ASSAY OF FREE THYROXINE: CPT | Performed by: INTERNAL MEDICINE

## 2024-06-21 PROCEDURE — 82550 ASSAY OF CK (CPK): CPT | Performed by: INTERNAL MEDICINE

## 2024-06-21 PROCEDURE — 80053 COMPREHEN METABOLIC PANEL: CPT | Performed by: INTERNAL MEDICINE

## 2024-06-21 NOTE — PROGRESS NOTES
Venipuncture Blood Specimen Collection  Venipuncture performed in left arm by Jacqui Clark MA with good hemostasis. Patient tolerated the procedure well without complications.   06/21/24   Jacqui Clark MA

## 2024-06-25 ENCOUNTER — OFFICE VISIT (OUTPATIENT)
Dept: FAMILY MEDICINE CLINIC | Facility: CLINIC | Age: 69
End: 2024-06-25
Payer: MEDICARE

## 2024-06-25 VITALS
HEIGHT: 65 IN | HEART RATE: 67 BPM | OXYGEN SATURATION: 97 % | DIASTOLIC BLOOD PRESSURE: 82 MMHG | TEMPERATURE: 98.6 F | BODY MASS INDEX: 27.22 KG/M2 | SYSTOLIC BLOOD PRESSURE: 138 MMHG | WEIGHT: 163.4 LBS

## 2024-06-25 DIAGNOSIS — Z12.31 ENCOUNTER FOR SCREENING MAMMOGRAM FOR MALIGNANT NEOPLASM OF BREAST: ICD-10-CM

## 2024-06-25 DIAGNOSIS — M13.0 POLYARTHRITIS: ICD-10-CM

## 2024-06-25 DIAGNOSIS — R91.8 PULMONARY NODULES: Chronic | ICD-10-CM

## 2024-06-25 DIAGNOSIS — E03.4 HYPOTHYROIDISM DUE TO ACQUIRED ATROPHY OF THYROID: Chronic | ICD-10-CM

## 2024-06-25 DIAGNOSIS — M54.32 SCIATICA OF LEFT SIDE: ICD-10-CM

## 2024-06-25 DIAGNOSIS — E78.49 OTHER HYPERLIPIDEMIA: Chronic | ICD-10-CM

## 2024-06-25 DIAGNOSIS — I10 ESSENTIAL HYPERTENSION: Primary | Chronic | ICD-10-CM

## 2024-06-25 PROCEDURE — G2211 COMPLEX E/M VISIT ADD ON: HCPCS | Performed by: INTERNAL MEDICINE

## 2024-06-25 PROCEDURE — 99214 OFFICE O/P EST MOD 30 MIN: CPT | Performed by: INTERNAL MEDICINE

## 2024-06-25 PROCEDURE — 1126F AMNT PAIN NOTED NONE PRSNT: CPT | Performed by: INTERNAL MEDICINE

## 2024-06-25 PROCEDURE — 1160F RVW MEDS BY RX/DR IN RCRD: CPT | Performed by: INTERNAL MEDICINE

## 2024-06-25 PROCEDURE — 3079F DIAST BP 80-89 MM HG: CPT | Performed by: INTERNAL MEDICINE

## 2024-06-25 PROCEDURE — 3075F SYST BP GE 130 - 139MM HG: CPT | Performed by: INTERNAL MEDICINE

## 2024-06-25 PROCEDURE — 1159F MED LIST DOCD IN RCRD: CPT | Performed by: INTERNAL MEDICINE

## 2024-06-25 RX ORDER — LEVOTHYROXINE SODIUM 0.03 MG/1
TABLET ORAL
Qty: 114 TABLET | Refills: 1 | Status: SHIPPED | OUTPATIENT
Start: 2024-06-25

## 2024-06-25 RX ORDER — TIZANIDINE 2 MG/1
2 TABLET ORAL NIGHTLY PRN
Qty: 90 TABLET | Refills: 1 | Status: SHIPPED | OUTPATIENT
Start: 2024-06-25

## 2024-06-25 RX ORDER — LOSARTAN POTASSIUM 100 MG/1
100 TABLET ORAL DAILY
Qty: 90 TABLET | Refills: 1 | Status: SHIPPED | OUTPATIENT
Start: 2024-06-25

## 2024-06-25 RX ORDER — UBIDECARENONE 75 MG
50 CAPSULE ORAL DAILY
COMMUNITY

## 2024-06-25 RX ORDER — ROSUVASTATIN CALCIUM 10 MG/1
10 TABLET, COATED ORAL DAILY
Qty: 90 TABLET | Refills: 1 | Status: SHIPPED | OUTPATIENT
Start: 2024-06-25

## 2024-06-25 RX ORDER — POTASSIUM CHLORIDE 750 MG/1
10 TABLET, EXTENDED RELEASE ORAL 2 TIMES DAILY
Qty: 180 TABLET | Refills: 1 | Status: SHIPPED | OUTPATIENT
Start: 2024-06-25

## 2024-06-25 RX ORDER — FUROSEMIDE 20 MG/1
20 TABLET ORAL DAILY
Qty: 90 TABLET | Refills: 1 | Status: SHIPPED | OUTPATIENT
Start: 2024-06-25

## 2024-06-25 NOTE — PROGRESS NOTES
Patient Name: Jocelyn Gee Today's Date: 2024   Patient MRN / CSN: [id number] / [id number] Date of Encounter: 2024   Patient Age / : 68 y.o. / 1955 Encounter Provider: Meenakshi Nunez DO   Referring Physician: No ref. provider found          Jocelyn is a 68 y.o. female who is being seen today for Follow-up (Lab results,cholesterol, blood pressure, kidney functioning per patient, thyroid. ), Hypertension, Hypothyroidism, and Hyperlipidemia      History of Present Illness    Jocelyn presents today for a follow-up on hypertension, hyperlipidemia, hypothyroidism, pulmonary nodules, and polyarthritis with left-sided sciatica.  She reports doing pretty well since last visit.  Her blood pressure has been well-controlled.  She is tolerating her current medicine regimen well.  She denies any chest pain or shortness of air.  She does note joint pain at times but is otherwise without complaints.  She did her labs recently, with improvement of TSH.  Free T4 was normal.  Cholesterol was controlled with LDL of 74.    Allergies include:Contrast dye (echo or unknown ct/mr)  Current Outpatient Medications   Medication Sig Dispense Refill    clobetasol (TEMOVATE) 0.05 % ointment Apply  topically to the appropriate area as directed 2 (Two) Times a Day As Needed (rash). 60 g 11    diclofenac (VOLTAREN) 50 MG EC tablet Take 1 tablet by mouth 2 (Two) Times a Day As Needed (pain). 180 tablet 1    furosemide (Lasix) 20 MG tablet Take 1 tablet by mouth Daily. 90 tablet 1    levothyroxine (SYNTHROID, LEVOTHROID) 25 MCG tablet Take 2 tablets (50 mcg) on  and .  Take 1 tablet daily rest of week. 114 tablet 1    losartan (COZAAR) 100 MG tablet Take 1 tablet by mouth Daily. 90 tablet 1    potassium chloride (KLOR-CON M10) 10 MEQ CR tablet Take 1 tablet by mouth 2 (Two) Times a Day. 180 tablet 1    rosuvastatin (CRESTOR) 10 MG tablet Take 1 tablet by mouth Daily. 90 tablet 1    tiZANidine (ZANAFLEX) 2 MG tablet Take  1 tablet by mouth At Night As Needed for Muscle Spasms (Back or hip pain). 90 tablet 1    vitamin B-12 (CYANOCOBALAMIN) 100 MCG tablet Take 0.5 tablets by mouth Daily. Patient is unsure of dosage.       No current facility-administered medications for this visit.     Past Medical History:   Diagnosis Date    Arthritis     Bronchitis     Disease of thyroid gland     Heart murmur     Hypertension     Mitral valve problem     Mitral valve prolapse     Other hyperlipidemia 01/29/2018     Family History   Problem Relation Age of Onset    Diabetes Mother     Heart disease Mother     Diabetes Father     Heart disease Father     Breast cancer Sister 60    Cancer Sister     Diabetes Maternal Grandfather     Kidney cancer Maternal Grandfather      Past Surgical History:   Procedure Laterality Date    CHOLECYSTECTOMY      CYSTOSCOPY RETROGRADE PYELOGRAM Bilateral 02/13/2023    Procedure: CYSTOSCOPY RETROGRADE PYELOGRAM;  Surgeon: Carlos Alberto Stovall MD;  [facility];  Service: Urology;  Laterality: Bilateral;    HX OVARIAN CYSTECTOMY      HYSTERECTOMY  1979    benign    VAGINAL DELIVERY      Girl 1971; Boys- 1974, 1975,1969     Social History     Substance and Sexual Activity   Alcohol Use No     Social History     Tobacco Use   Smoking Status Every Day    Current packs/day: 0.25    Average packs/day: 0.3 packs/day for 50.5 years (12.6 ttl pk-yrs)    Types: Cigarettes    Start date: 1974   Smokeless Tobacco Never   Tobacco Comments    Few cigarettes per day     Social History     Substance and Sexual Activity   Drug Use No     Review of Systems   Respiratory:  Negative for shortness of breath.    Cardiovascular:  Negative for chest pain.   Gastrointestinal:  Negative for abdominal pain and blood in stool.   Genitourinary:  Negative for hematuria.        Depression Assessment Review:  PHQ-9 Total Score:    Vital Signs & Measurements Taken This Encounter  /82 (BP Location: Right arm, Patient Position: Sitting,  "Cuff Size: Adult)   Pulse 67   Temp 98.6 °F (37 °C) (Oral)   Ht 165.1 cm (65\")   Wt 74.1 kg (163 lb 6.4 oz)   SpO2 97%   BMI 27.19 kg/m²    SpO2 Percentage    06/25/24 1028   SpO2: 97%           Physical Exam  Vitals reviewed.   Constitutional:       General: She is not in acute distress.  HENT:      Head: Normocephalic and atraumatic.   Eyes:      General: No scleral icterus.     Extraocular Movements: Extraocular movements intact.      Conjunctiva/sclera: Conjunctivae normal.      Pupils: Pupils are equal, round, and reactive to light.   Cardiovascular:      Rate and Rhythm: Normal rate and regular rhythm.   Pulmonary:      Effort: Pulmonary effort is normal. No respiratory distress.      Breath sounds: Normal breath sounds. No wheezing or rhonchi.   Musculoskeletal:         General: No swelling.      Cervical back: Neck supple. No tenderness.   Lymphadenopathy:      Cervical: No cervical adenopathy.   Skin:     General: Skin is warm and dry.      Coloration: Skin is not jaundiced.   Neurological:      Mental Status: She is alert.   Psychiatric:         Mood and Affect: Mood normal.         Behavior: Behavior normal.         Thought Content: Thought content normal.         Judgment: Judgment normal.              Assessment & Plan  Patient Active Problem List   Diagnosis    Abdominal pain    Constipation    Disease of gallbladder    Billowing mitral valve    Hypothyroidism due to acquired atrophy of thyroid    Other hyperlipidemia    Essential hypertension    Other atopic dermatitis    Pulmonary nodules    Hydronephrosis with ureteropelvic junction (UPJ) obstruction    Encounter for wellness examination    Health care maintenance    Flexural eczema    Postmenopausal    Encounter for screening mammogram for malignant neoplasm of breast    Polyarthritis    Sciatica of left side       ICD-10-CM ICD-9-CM   1. Essential hypertension  I10 401.9   2. Other hyperlipidemia  E78.49 272.4   3. Hypothyroidism due to " acquired atrophy of thyroid  E03.4 244.8     246.8   4. Pulmonary nodules  R91.8 793.19   5. Encounter for screening mammogram for malignant neoplasm of breast  Z12.31 V76.12   6. Polyarthritis  M13.0 716.50   7. Sciatica of left side  M54.32 724.3     Orders Placed This Encounter   Procedures    CT Chest Without Contrast     Standing Status:   Future     Standing Expiration Date:   6/25/2025     Order Specific Question:   Does this exam require Efren Bronch Protocol?     Answer:   Yes     Order Specific Question:   Reason for Exam:     Answer:   lung nodules, h/o smoking     Order Specific Question:   Release to patient     Answer:   Routine Release [[id number]]    Mammo Screening Digital Tomosynthesis Bilateral With CAD     Schedule in October 2024, 1 yr after previous.     Standing Status:   Future     Standing Expiration Date:   6/25/2025     Scheduling Instructions:      Schedule in October 2024, 1 yr after previous.     Order Specific Question:   Reason for Exam:     Answer:   screening for breast ca     Order Specific Question:   Release to patient     Answer:   Routine Release [[id number]]    CBC (No Diff)     Standing Status:   Future     Standing Expiration Date:   6/25/2025     Order Specific Question:   Release to patient     Answer:   Routine Release [[id number]]    Comprehensive Metabolic Panel     Standing Status:   Future     Standing Expiration Date:   6/25/2025     Order Specific Question:   Release to patient     Answer:   Routine Release [[id number]]    Lipid Panel     Standing Status:   Future     Standing Expiration Date:   6/25/2025     Order Specific Question:   Release to patient     Answer:   Routine Release [[id number]]    T4, Free     Standing Status:   Future     Standing Expiration Date:   6/25/2025     Order Specific Question:   Release to patient     Answer:   Routine Release [[id number]]    TSH     Standing Status:   Future     Standing Expiration Date:   6/25/2025     Order Specific  Question:   Release to patient     Answer:   Routine Release [[id number]]    CK     Standing Status:   Future     Standing Expiration Date:   6/25/2025     Order Specific Question:   Release to patient     Answer:   Routine Release [[id number]]       Meds Ordered During Visit:  New Medications Ordered This Visit   Medications    diclofenac (VOLTAREN) 50 MG EC tablet     Sig: Take 1 tablet by mouth 2 (Two) Times a Day As Needed (pain).     Dispense:  180 tablet     Refill:  1    furosemide (Lasix) 20 MG tablet     Sig: Take 1 tablet by mouth Daily.     Dispense:  90 tablet     Refill:  1    levothyroxine (SYNTHROID, LEVOTHROID) 25 MCG tablet     Sig: Take 2 tablets (50 mcg) on Mondays and Thursdays.  Take 1 tablet daily rest of week.     Dispense:  114 tablet     Refill:  1    losartan (COZAAR) 100 MG tablet     Sig: Take 1 tablet by mouth Daily.     Dispense:  90 tablet     Refill:  1    potassium chloride (KLOR-CON M10) 10 MEQ CR tablet     Sig: Take 1 tablet by mouth 2 (Two) Times a Day.     Dispense:  180 tablet     Refill:  1    rosuvastatin (CRESTOR) 10 MG tablet     Sig: Take 1 tablet by mouth Daily.     Dispense:  90 tablet     Refill:  1    tiZANidine (ZANAFLEX) 2 MG tablet     Sig: Take 1 tablet by mouth At Night As Needed for Muscle Spasms (Back or hip pain).     Dispense:  90 tablet     Refill:  1     I reviewed recent lab results with patient today.  We will continue current medicine regimen at this time.  Updated refills today as requested.  I encouraged tobacco cessation.  Jocelyn is not quite ready to quit.  I recommended CT follow-up given patient's smoking history and we will plan to schedule this soon.  We will also plan to update mammogram this fall, when due.  We will plan to update labs as above just prior to next appointment.    Return in about 4 months (around 10/25/2024), or if symptoms worsen or fail to improve, for Recheck, Labs Prior.          Referring Provider (if known): No ref. provider  found      This document has been electronically signed by Meenakshi Nunez DO  June 25, 2024 14:10 EDT    Meenakshi Nunez DO, FACOI  [address] [place] [address] [phone] (office)    Part of this note may be an electronic transcription/translation of spoken language to printed text using the Dragon Dictation System.

## 2024-09-08 NOTE — TELEPHONE ENCOUNTER
Called pt back and reviewed CT scan results as well as reason for 6 month follow up CT scan. See result note   N/A

## 2025-01-23 DIAGNOSIS — E78.49 OTHER HYPERLIPIDEMIA: Chronic | ICD-10-CM

## 2025-01-23 DIAGNOSIS — M13.0 POLYARTHRITIS: ICD-10-CM

## 2025-01-23 DIAGNOSIS — M54.32 SCIATICA OF LEFT SIDE: ICD-10-CM

## 2025-01-23 RX ORDER — TIZANIDINE 2 MG/1
2 TABLET ORAL NIGHTLY PRN
Qty: 90 TABLET | Refills: 1 | Status: SHIPPED | OUTPATIENT
Start: 2025-01-23

## 2025-01-23 RX ORDER — ROSUVASTATIN CALCIUM 10 MG/1
10 TABLET, COATED ORAL DAILY
Qty: 90 TABLET | Refills: 1 | Status: SHIPPED | OUTPATIENT
Start: 2025-01-23

## 2025-01-23 NOTE — TELEPHONE ENCOUNTER
Caller: Jocelyn Gee    Relationship: Self    Best call back number:     Requested Prescriptions:   Requested Prescriptions     Pending Prescriptions Disp Refills    rosuvastatin (CRESTOR) 10 MG tablet 90 tablet 1     Sig: Take 1 tablet by mouth Daily.    tiZANidine (ZANAFLEX) 2 MG tablet 90 tablet 1     Sig: Take 1 tablet by mouth At Night As Needed for Muscle Spasms (Back or hip pain).        Pharmacy where request should be sent: [facility] [address] [place] [address] - [phone]  - [phone]     Last office visit with prescribing clinician: 6/25/2024   Last telemedicine visit with prescribing clinician: Visit date not found   Next office visit with prescribing clinician: Visit date not found     Additional details provided by patient: PATIENT IS OUT OF MEDICATION

## 2025-02-17 DIAGNOSIS — E03.4 HYPOTHYROIDISM DUE TO ACQUIRED ATROPHY OF THYROID: Chronic | ICD-10-CM

## 2025-02-17 RX ORDER — LEVOTHYROXINE SODIUM 25 UG/1
TABLET ORAL
Qty: 114 TABLET | Refills: 1 | Status: SHIPPED | OUTPATIENT
Start: 2025-02-17

## 2025-02-17 NOTE — TELEPHONE ENCOUNTER
Patient needs an appointment scheduled within the next couple of months.  It could be planned for after March 25 to accommodate a wellness exam also.  I sent in refill as requested.

## 2025-02-17 NOTE — TELEPHONE ENCOUNTER
Caller: Jocelyn Gee    Relationship: Self    Best call back number: [phone]     Requested Prescriptions:   Requested Prescriptions     Pending Prescriptions Disp Refills    levothyroxine (SYNTHROID, LEVOTHROID) 25 MCG tablet 114 tablet 1     Sig: Take 2 tablets (50 mcg) on Mondays and Thursdays.  Take 1 tablet daily rest of week.        Pharmacy where request should be sent: [facility] [address] [place] [address] - [phone] [facility] [phone] FX     Last office visit with prescribing clinician: 6/25/2024   Last telemedicine visit with prescribing clinician: Visit date not found   Next office visit with prescribing clinician: Visit date not found     Additional details provided by patient: PATIENT WANTING THE REFILL BEFORE THE SNOW STORM   SHE HAS 6 DAYS REMAINING

## 2025-03-20 DIAGNOSIS — I10 ESSENTIAL HYPERTENSION: Chronic | ICD-10-CM

## 2025-03-20 RX ORDER — POTASSIUM CHLORIDE 750 MG/1
TABLET, EXTENDED RELEASE ORAL 2 TIMES DAILY
Qty: 180 TABLET | Refills: 1 | Status: SHIPPED | OUTPATIENT
Start: 2025-03-20

## 2025-03-27 ENCOUNTER — OFFICE VISIT (OUTPATIENT)
Dept: FAMILY MEDICINE CLINIC | Facility: CLINIC | Age: 70
End: 2025-03-27
Payer: MEDICARE

## 2025-03-27 VITALS
SYSTOLIC BLOOD PRESSURE: 124 MMHG | WEIGHT: 161.2 LBS | TEMPERATURE: 98 F | OXYGEN SATURATION: 95 % | BODY MASS INDEX: 26.86 KG/M2 | HEART RATE: 73 BPM | DIASTOLIC BLOOD PRESSURE: 70 MMHG | HEIGHT: 65 IN

## 2025-03-27 DIAGNOSIS — Z00.00 ENCOUNTER FOR WELLNESS EXAMINATION: Primary | ICD-10-CM

## 2025-03-27 DIAGNOSIS — E78.49 OTHER HYPERLIPIDEMIA: Chronic | ICD-10-CM

## 2025-03-27 DIAGNOSIS — Z23 NEED FOR IMMUNIZATION AGAINST INFLUENZA: ICD-10-CM

## 2025-03-27 DIAGNOSIS — E03.4 HYPOTHYROIDISM DUE TO ACQUIRED ATROPHY OF THYROID: Chronic | ICD-10-CM

## 2025-03-27 DIAGNOSIS — I10 ESSENTIAL HYPERTENSION: Chronic | ICD-10-CM

## 2025-03-27 DIAGNOSIS — M13.0 POLYARTHRITIS: ICD-10-CM

## 2025-03-27 DIAGNOSIS — Z12.11 COLON CANCER SCREENING: ICD-10-CM

## 2025-03-27 DIAGNOSIS — Z00.00 HEALTH CARE MAINTENANCE: ICD-10-CM

## 2025-03-27 LAB
ALBUMIN SERPL-MCNC: 4.2 G/DL (ref 3.5–5.2)
ALBUMIN/GLOB SERPL: 1.4 G/DL
ALP SERPL-CCNC: 94 U/L (ref 39–117)
ALT SERPL W P-5'-P-CCNC: 14 U/L (ref 1–33)
ANION GAP SERPL CALCULATED.3IONS-SCNC: 11.6 MMOL/L (ref 5–15)
AST SERPL-CCNC: 14 U/L (ref 1–32)
BILIRUB SERPL-MCNC: 0.4 MG/DL (ref 0–1.2)
BUN SERPL-MCNC: 19 MG/DL (ref 8–23)
BUN/CREAT SERPL: 18.8 (ref 7–25)
CALCIUM SPEC-SCNC: 9.3 MG/DL (ref 8.6–10.5)
CHLORIDE SERPL-SCNC: 101 MMOL/L (ref 98–107)
CHOLEST SERPL-MCNC: 125 MG/DL (ref 0–200)
CK SERPL-CCNC: 73 U/L (ref 20–180)
CO2 SERPL-SCNC: 24.4 MMOL/L (ref 22–29)
CREAT SERPL-MCNC: 1.01 MG/DL (ref 0.57–1)
DEPRECATED RDW RBC AUTO: 43.4 FL (ref 37–54)
EGFRCR SERPLBLD CKD-EPI 2021: 60 ML/MIN/1.73
ERYTHROCYTE [DISTWIDTH] IN BLOOD BY AUTOMATED COUNT: 12.7 % (ref 12.3–15.4)
GLOBULIN UR ELPH-MCNC: 2.9 GM/DL
GLUCOSE SERPL-MCNC: 90 MG/DL (ref 65–99)
HCT VFR BLD AUTO: 46.7 % (ref 34–46.6)
HDLC SERPL-MCNC: 43 MG/DL (ref 40–60)
HGB BLD-MCNC: 15.5 G/DL (ref 12–15.9)
LDLC SERPL CALC-MCNC: 64 MG/DL (ref 0–100)
LDLC/HDLC SERPL: 1.47 {RATIO}
MCH RBC QN AUTO: 31.3 PG (ref 26.6–33)
MCHC RBC AUTO-ENTMCNC: 33.2 G/DL (ref 31.5–35.7)
MCV RBC AUTO: 94.3 FL (ref 79–97)
PLATELET # BLD AUTO: 208 10*3/MM3 (ref 140–450)
PMV BLD AUTO: 10.9 FL (ref 6–12)
POTASSIUM SERPL-SCNC: 4.3 MMOL/L (ref 3.5–5.2)
PROT SERPL-MCNC: 7.1 G/DL (ref 6–8.5)
RBC # BLD AUTO: 4.95 10*6/MM3 (ref 3.77–5.28)
SODIUM SERPL-SCNC: 137 MMOL/L (ref 136–145)
T4 FREE SERPL-MCNC: 1.6 NG/DL (ref 0.92–1.68)
TRIGL SERPL-MCNC: 94 MG/DL (ref 0–150)
TSH SERPL DL<=0.05 MIU/L-ACNC: 4.17 UIU/ML (ref 0.27–4.2)
VLDLC SERPL-MCNC: 18 MG/DL (ref 5–40)
WBC NRBC COR # BLD AUTO: 5.13 10*3/MM3 (ref 3.4–10.8)

## 2025-03-27 PROCEDURE — 80053 COMPREHEN METABOLIC PANEL: CPT | Performed by: INTERNAL MEDICINE

## 2025-03-27 PROCEDURE — 85027 COMPLETE CBC AUTOMATED: CPT | Performed by: INTERNAL MEDICINE

## 2025-03-27 PROCEDURE — 80061 LIPID PANEL: CPT | Performed by: INTERNAL MEDICINE

## 2025-03-27 PROCEDURE — 84439 ASSAY OF FREE THYROXINE: CPT | Performed by: INTERNAL MEDICINE

## 2025-03-27 PROCEDURE — 84443 ASSAY THYROID STIM HORMONE: CPT | Performed by: INTERNAL MEDICINE

## 2025-03-27 PROCEDURE — 82550 ASSAY OF CK (CPK): CPT | Performed by: INTERNAL MEDICINE

## 2025-03-27 RX ORDER — FUROSEMIDE 20 MG/1
20 TABLET ORAL DAILY
Qty: 90 TABLET | Refills: 1 | Status: SHIPPED | OUTPATIENT
Start: 2025-03-27

## 2025-03-27 RX ORDER — LEVOTHYROXINE SODIUM 25 UG/1
TABLET ORAL
Qty: 114 TABLET | Refills: 1 | Status: SHIPPED | OUTPATIENT
Start: 2025-03-27

## 2025-03-27 RX ORDER — LOSARTAN POTASSIUM 100 MG/1
100 TABLET ORAL DAILY
Qty: 90 TABLET | Refills: 1 | Status: SHIPPED | OUTPATIENT
Start: 2025-03-27

## 2025-03-27 RX ORDER — ROSUVASTATIN CALCIUM 10 MG/1
10 TABLET, COATED ORAL DAILY
Qty: 90 TABLET | Refills: 1 | Status: SHIPPED | OUTPATIENT
Start: 2025-03-27

## 2025-03-27 RX ORDER — POTASSIUM CHLORIDE 750 MG/1
10 TABLET, EXTENDED RELEASE ORAL 2 TIMES DAILY
Qty: 180 TABLET | Refills: 1 | Status: SHIPPED | OUTPATIENT
Start: 2025-03-27

## 2025-03-27 NOTE — ASSESSMENT & PLAN NOTE
Orders:    TSH    Lipid Panel    Comprehensive Metabolic Panel    CBC (No Diff)    losartan (COZAAR) 100 MG tablet; Take 1 tablet by mouth Daily.    furosemide (Lasix) 20 MG tablet; Take 1 tablet by mouth Daily.    potassium chloride (KLOR-CON M10) 10 MEQ CR tablet; Take 1 tablet by mouth 2 (Two) Times a Day.

## 2025-03-27 NOTE — ASSESSMENT & PLAN NOTE
Orders:    CK    Lipid Panel    Comprehensive Metabolic Panel    rosuvastatin (CRESTOR) 10 MG tablet; Take 1 tablet by mouth Daily.

## 2025-03-27 NOTE — PROGRESS NOTES
Subjective   The ABCs of the Annual Wellness Visit  Medicare Wellness Visit      Jocelyn Gee is a 69 y.o. patient who presents for a Medicare Wellness Visit.    The following portions of the patient's history were reviewed and   updated as appropriate: allergies, current medications, past family history, past medical history, past social history, past surgical history, and problem list.    Compared to one year ago, the patient's physical   health is the same.  Compared to one year ago, the patient's mental   health is the same.    Recent Hospitalizations:  She was not admitted to the hospital during the last year.     Current Medical Providers:  Patient Care Team:  Meenakshi Nunez DO as PCP - General (Family Medicine)  Sangeeta Fernandez RN as Ambulatory  ([facility])  Dubin, Ronald S, MD as Consulting Physician (Orthopedic Surgery)    Outpatient Medications Prior to Visit   Medication Sig Dispense Refill    clobetasol (TEMOVATE) 0.05 % ointment Apply  topically to the appropriate area as directed 2 (Two) Times a Day As Needed (rash). 60 g 11    tiZANidine (ZANAFLEX) 2 MG tablet Take 1 tablet by mouth At Night As Needed for Muscle Spasms (Back or hip pain). 90 tablet 1    vitamin B-12 (CYANOCOBALAMIN) 100 MCG tablet Take 0.5 tablets by mouth Daily. Patient is unsure of dosage.      diclofenac (VOLTAREN) 50 MG EC tablet Take 1 tablet by mouth 2 (Two) Times a Day As Needed (pain). 180 tablet 1    furosemide (Lasix) 20 MG tablet Take 1 tablet by mouth Daily. 90 tablet 1    levothyroxine (SYNTHROID, LEVOTHROID) 25 MCG tablet Take 2 tablets (50 mcg) on Mondays and Thursdays.  Take 1 tablet daily rest of week. 114 tablet 1    losartan (COZAAR) 100 MG tablet Take 1 tablet by mouth Daily. 90 tablet 1    potassium chloride (KLOR-CON M10) 10 MEQ CR tablet Take 1 tablet by mouth twice daily 180 tablet 1    rosuvastatin (CRESTOR) 10 MG tablet Take 1 tablet by mouth Daily. 90 tablet 1     No  "facility-administered medications prior to visit.     No opioid medication identified on active medication list. I have reviewed chart for other potential  high risk medication/s and harmful drug interactions in the elderly.      Aspirin is not on active medication list.  Aspirin use is not indicated based on review of current medical condition/s. Risk of harm outweighs potential benefits.      Patient Active Problem List   Diagnosis    Abdominal pain    Constipation    Disease of gallbladder    Billowing mitral valve    Hypothyroidism due to acquired atrophy of thyroid    Other hyperlipidemia    Essential hypertension    Other atopic dermatitis    Pulmonary nodules    Hydronephrosis with ureteropelvic junction (UPJ) obstruction    Encounter for wellness examination    Health care maintenance    Flexural eczema    Postmenopausal    Encounter for screening mammogram for malignant neoplasm of breast    Polyarthritis    Sciatica of left side     Advance Care Planning Advance Directive is not on file.  ACP discussion was held with the patient during this visit. Patient does not have an advance directive, declines further assistance.            Objective   Vitals:    03/27/25 0958   BP: 124/70   BP Location: Left arm   Patient Position: Sitting   Cuff Size: Large Adult   Pulse: 73   Temp: 98 °F (36.7 °C)   TempSrc: Oral   SpO2: 95%   Weight: 73.1 kg (161 lb 3.2 oz)   Height: 165.1 cm (65\")   PainSc: 4    PainLoc: Ankle       Estimated body mass index is 26.83 kg/m² as calculated from the following:    Height as of this encounter: 165.1 cm (65\").    Weight as of this encounter: 73.1 kg (161 lb 3.2 oz).    BMI is >= 25 and <30. (Overweight) The following options were offered after discussion;: information on healthy weight added to patient's after visit summary          Does the patient have evidence of cognitive impairment? No                                                                                              "   Health  Risk Assessment    Smoking Status:  Social History     Tobacco Use   Smoking Status Every Day    Current packs/day: 0.25    Average packs/day: 0.3 packs/day for 51.2 years (12.8 ttl pk-yrs)    Types: Cigarettes    Start date: 1974   Smokeless Tobacco Never   Tobacco Comments    Few cigarettes per day     Alcohol Consumption:  Social History     Substance and Sexual Activity   Alcohol Use No       Fall Risk Screen  PALAKADI Fall Risk Assessment was completed, and patient is at MODERATE risk for falls. Assessment completed on:3/27/2025    Depression Screening   Little interest or pleasure in doing things? Not at all   Feeling down, depressed, or hopeless? Not at all   PHQ-2 Total Score 0      Health Habits and Functional and Cognitive Screening:      3/27/2025     9:55 AM   Functional & Cognitive Status   Do you have difficulty preparing food and eating? No   Do you have difficulty bathing yourself, getting dressed or grooming yourself? No   Do you have difficulty using the toilet? No   Do you have difficulty moving around from place to place? No   Do you have trouble with steps or getting out of a bed or a chair? No   Current Diet Well Balanced Diet   Dental Exam Not up to date   Eye Exam Not up to date   Exercise (times per week) 3 times per week   Current Exercises Include Walking;House Cleaning   Do you need help using the phone?  No   Are you deaf or do you have serious difficulty hearing?  No   Do you need help to go to places out of walking distance? Yes   Do you need help shopping? No   Do you need help preparing meals?  No   Do you need help with housework?  No   Do you need help with laundry? No   Do you need help taking your medications? No   Do you need help managing money? No   Do you ever drive or ride in a car without wearing a seat belt? No   Have you felt unusual stress, anger or loneliness in the last month? No   Who do you live with? Alone   If you need help, do you have trouble finding  someone available to you? No   Have you been bothered in the last four weeks by sexual problems? No   Do you have difficulty concentrating, remembering or making decisions? No           Age-appropriate Screening Schedule:  Refer to the list below for future screening recommendations based on patient's age, sex and/or medical conditions. Orders for these recommended tests are listed in the plan section. The patient has been provided with a written plan.    Health Maintenance List  Health Maintenance   Topic Date Due    ZOSTER VACCINE (3 of 3) 10/23/2017    COVID-19 Vaccine (4 - 2024-25 season) 09/01/2024    COLORECTAL CANCER SCREENING  09/14/2024    ANNUAL WELLNESS VISIT  03/25/2025    LIPID PANEL  06/21/2025    MAMMOGRAM  10/06/2025    DXA SCAN  10/06/2025    TDAP/TD VACCINES (2 - Td or Tdap) 07/12/2027    HEPATITIS C SCREENING  Completed    INFLUENZA VACCINE  Completed    Pneumococcal Vaccine 50+  Completed    LUNG CANCER SCREENING  Discontinued                                                                                                                                                CMS Preventative Services Quick Reference  Risk Factors Identified During Encounter  Immunizations Discussed/Encouraged: Influenza-updated today.    Age appropriate screenings were discussed with patient today. We will update metabolic screenings today. She prefers to hold on mammogram for now but agrees to cologuard.     The above risks/problems have been discussed with the patient.  Pertinent information has been shared with the patient in the After Visit Summary.  An After Visit Summary and PPPS were made available to the patient.    Follow Up:   Next Medicare Wellness visit to be scheduled in 1 year.         Additional E&M Note during same encounter follows:  Patient has additional, significant, and separately identifiable condition(s)/problem(s) that require work above and beyond the Medicare Wellness Visit     Chief  "Complaint  Medicare Wellness-subsequent (Doing good today. No issues or concerns at this time. ), Hypertension (Blood pressure is running good  at home around 112//70. ), Hypothyroidism, Hyperlipidemia, and Ankle Pain (She fractured her left ankle on 12/13. She is doing better with it. )    Win Banks is also being seen today for additional medical problem/s including hypertension, hypothyroidism, hyperlipidemia, and polyarthritis.  Since last visit, she had a fall when slipping in the mud.  This resulted in a left ankle fracture.  She has followed up with Ortho as recommended and completed PT.  At this point, Ortho has released her and she feels that she is doing better.  She takes diclofenac as needed and needs a refill of that today.  Her blood pressure is well-controlled.  She is doing well with the current medicine regimen.  She is planning to update labs today.  She denies any recent chest pain or shortness of air.    Review of Systems   Constitutional:  Negative for fever.   Respiratory:  Negative for shortness of breath.    Cardiovascular:  Negative for chest pain.   Gastrointestinal:  Negative for blood in stool.   Genitourinary:  Negative for hematuria.              Objective   Vital Signs:  /70 (BP Location: Left arm, Patient Position: Sitting, Cuff Size: Large Adult)   Pulse 73   Temp 98 °F (36.7 °C) (Oral)   Ht 165.1 cm (65\")   Wt 73.1 kg (161 lb 3.2 oz)   SpO2 95%   BMI 26.83 kg/m²   Physical Exam  Vitals reviewed.   Constitutional:       General: She is not in acute distress.  HENT:      Head: Normocephalic and atraumatic.   Eyes:      General: No scleral icterus.     Extraocular Movements: Extraocular movements intact.      Conjunctiva/sclera: Conjunctivae normal.      Pupils: Pupils are equal, round, and reactive to light.   Cardiovascular:      Rate and Rhythm: Normal rate and regular rhythm.   Pulmonary:      Effort: Pulmonary effort is normal. No respiratory distress. "      Breath sounds: Normal breath sounds.   Musculoskeletal:         General: No swelling.      Cervical back: Neck supple. No tenderness.   Lymphadenopathy:      Cervical: No cervical adenopathy.   Skin:     General: Skin is warm and dry.      Coloration: Skin is not jaundiced.   Neurological:      Mental Status: She is alert.   Psychiatric:         Mood and Affect: Mood normal.         Behavior: Behavior normal.         Thought Content: Thought content normal.         Judgment: Judgment normal.              Assessment and Plan      Encounter for wellness examination         Health care maintenance         Need for immunization against influenza    Orders:    Fluzone High-Dose 65+yrs ([id number])    Other hyperlipidemia     Orders:    CK    Lipid Panel    Comprehensive Metabolic Panel    rosuvastatin (CRESTOR) 10 MG tablet; Take 1 tablet by mouth Daily.    Essential hypertension      Orders:    TSH    Lipid Panel    Comprehensive Metabolic Panel    CBC (No Diff)    losartan (COZAAR) 100 MG tablet; Take 1 tablet by mouth Daily.    furosemide (Lasix) 20 MG tablet; Take 1 tablet by mouth Daily.    potassium chloride (KLOR-CON M10) 10 MEQ CR tablet; Take 1 tablet by mouth 2 (Two) Times a Day.    Hypothyroidism due to acquired atrophy of thyroid    Orders:    TSH    T4, Free    levothyroxine (SYNTHROID, LEVOTHROID) 25 MCG tablet; Take 2 tablets (50 mcg) on Mondays and Thursdays.  Take 1 tablet daily rest of week.    Colon cancer screening    Orders:    Cologuard - Stool, Per Rectum; Future    Polyarthritis    Orders:    diclofenac (VOLTAREN) 50 MG EC tablet; Take 1 tablet by mouth 2 (Two) Times a Day As Needed (pain).       Clinically, Jocelyn is doing well today.  Her left ankle looks well on exam and she demonstrates appropriate active range of motion in this joint. We will continue current medicine regimen.  I updated refills as needed.  We will also update labs today as above.    Follow Up   Return in about 6 months  (around 9/27/2025), or if symptoms worsen or fail to improve, for Recheck.  Patient was given instructions and counseling regarding her condition or for health maintenance advice. Please see specific information pulled into the AVS if appropriate.        This document has been electronically signed by Meenakshi Nunez DO  March 27, 2025 11:14 EDT

## 2025-03-27 NOTE — ASSESSMENT & PLAN NOTE
Orders:    diclofenac (VOLTAREN) 50 MG EC tablet; Take 1 tablet by mouth 2 (Two) Times a Day As Needed (pain).

## 2025-03-27 NOTE — ASSESSMENT & PLAN NOTE
Orders:    TSH    T4, Free    levothyroxine (SYNTHROID, LEVOTHROID) 25 MCG tablet; Take 2 tablets (50 mcg) on Mondays and Thursdays.  Take 1 tablet daily rest of week.

## 2025-03-27 NOTE — PROGRESS NOTES
Venipuncture Blood Specimen Collection  Venipuncture performed in left antecubital by Heather Symes, MA with good hemostasis. Patient tolerated the procedure well without complications.   03/27/25   Heather Symes, MA    Immunization  Immunization performed in left deltoid by Heather Symes, MA. Patient tolerated the procedure well without complications.  03/27/25   Heather Symes, MA

## 2025-03-28 ENCOUNTER — TELEPHONE (OUTPATIENT)
Dept: FAMILY MEDICINE CLINIC | Facility: CLINIC | Age: 70
End: 2025-03-28

## 2025-03-28 NOTE — TELEPHONE ENCOUNTER
Caller: Jocelyn Gee    Relationship: Self    Best call back number: [phone]     Caller requesting test results: SELF    What test was performed: LABS    When was the test performed: 03/27/25    Where was the test performed: IN OFFICE    Additional notes: CHECKING THE STATUS OF THE RESULTS. PLEASE CALL TO REVIEW.

## 2025-08-06 DIAGNOSIS — M54.32 SCIATICA OF LEFT SIDE: ICD-10-CM

## 2025-08-06 DIAGNOSIS — M13.0 POLYARTHRITIS: ICD-10-CM

## 2025-08-06 RX ORDER — TIZANIDINE 2 MG/1
TABLET ORAL
Qty: 90 TABLET | Refills: 0 | Status: SHIPPED | OUTPATIENT
Start: 2025-08-06

## (undated) DEVICE — GW ZIPWIRE STD/SHFT STR TPR/3CM .035IN 150CM

## (undated) DEVICE — GLV SURG PREMIERPRO MIC LTX PF SZ8 BRN

## (undated) DEVICE — COR CYSTO: Brand: MEDLINE INDUSTRIES, INC.

## (undated) DEVICE — CATH URETRL FLXITP POLLACK STD 5F 70CM